# Patient Record
Sex: FEMALE | Race: WHITE | NOT HISPANIC OR LATINO | Employment: OTHER | URBAN - NONMETROPOLITAN AREA
[De-identification: names, ages, dates, MRNs, and addresses within clinical notes are randomized per-mention and may not be internally consistent; named-entity substitution may affect disease eponyms.]

---

## 2017-01-05 ENCOUNTER — AMBULATORY - GICH (OUTPATIENT)
Dept: SCHEDULING | Facility: OTHER | Age: 77
End: 2017-01-05

## 2017-01-13 ENCOUNTER — HISTORY (OUTPATIENT)
Dept: FAMILY MEDICINE | Facility: OTHER | Age: 77
End: 2017-01-13

## 2017-01-13 ENCOUNTER — OFFICE VISIT - GICH (OUTPATIENT)
Dept: FAMILY MEDICINE | Facility: OTHER | Age: 77
End: 2017-01-13

## 2017-01-13 DIAGNOSIS — J01.00 ACUTE MAXILLARY SINUSITIS: ICD-10-CM

## 2017-01-13 DIAGNOSIS — Z23 ENCOUNTER FOR IMMUNIZATION: ICD-10-CM

## 2017-03-16 ENCOUNTER — AMBULATORY - GICH (OUTPATIENT)
Dept: LAB | Facility: OTHER | Age: 77
End: 2017-03-16

## 2017-03-16 DIAGNOSIS — M05.89 OTHER RHEUMATOID ARTHRITIS WITH RHEUMATOID FACTOR OF MULTIPLE SITES (CODE): ICD-10-CM

## 2017-03-16 LAB
ABSOLUTE BASOPHILS - HISTORICAL: 0.1 THOU/CU MM
ABSOLUTE EOSINOPHILS - HISTORICAL: 0.1 THOU/CU MM
ABSOLUTE LYMPHOCYTES - HISTORICAL: 1 THOU/CU MM (ref 0.9–2.9)
ABSOLUTE MONOCYTES - HISTORICAL: 0.6 THOU/CU MM
ABSOLUTE NEUTROPHILS - HISTORICAL: 3.2 THOU/CU MM (ref 1.7–7)
ALBUMIN SERPL-MCNC: 4.4 G/DL (ref 3.5–5.7)
ALT (SGPT) - HISTORICAL: 12 IU/L (ref 7–52)
BASOPHILS # BLD AUTO: 2.2 %
C-REACTIVE PROTEIN - HISTORICAL: <1 MG/DL
CREAT SERPL-MCNC: 0.93 MG/DL (ref 0.7–1.3)
EOSINOPHIL NFR BLD AUTO: 2.1 %
ERYTHROCYTE [DISTWIDTH] IN BLOOD BY AUTOMATED COUNT: 12.5 % (ref 11.5–15.5)
GFR IF NOT AFRICAN AMERICAN - HISTORICAL: 59 ML/MIN/1.73M2
HCT VFR BLD AUTO: 41.1 % (ref 33–51)
HEMOGLOBIN: 13.5 G/DL (ref 12–16)
LYMPHOCYTES NFR BLD AUTO: 19.6 % (ref 20–44)
MCH RBC QN AUTO: 31.9 PG (ref 26–34)
MCHC RBC AUTO-ENTMCNC: 32.9 G/DL (ref 32–36)
MCV RBC AUTO: 97 FL (ref 80–100)
MONOCYTES NFR BLD AUTO: 11.9 %
NEUTROPHILS NFR BLD AUTO: 64.1 % (ref 42–72)
PLATELET # BLD AUTO: 263 THOU/CU MM (ref 140–440)
PMV BLD: 6.5 FL (ref 6.5–11)
RED BLOOD COUNT - HISTORICAL: 4.24 MIL/CU MM (ref 4–5.2)
WHITE BLOOD COUNT - HISTORICAL: 5 THOU/CU MM (ref 4.5–11)

## 2017-06-20 ENCOUNTER — AMBULATORY - GICH (OUTPATIENT)
Dept: LAB | Facility: OTHER | Age: 77
End: 2017-06-20

## 2017-06-20 DIAGNOSIS — M05.79 RHEUMATOID ARTHRITIS OF MULTIPLE SITES WITHOUT ORGAN OR SYSTEM INVOLVEMENT WITH POSITIVE RHEUMATOID FACTOR (H): ICD-10-CM

## 2017-06-20 LAB
ABSOLUTE BASOPHILS - HISTORICAL: 0.1 THOU/CU MM
ABSOLUTE EOSINOPHILS - HISTORICAL: 0.1 THOU/CU MM
ABSOLUTE IMMATURE GRANULOCYTES(METAS,MYELOS,PROS) - HISTORICAL: 0 THOU/CU MM
ABSOLUTE LYMPHOCYTES - HISTORICAL: 1 THOU/CU MM (ref 0.9–2.9)
ABSOLUTE MONOCYTES - HISTORICAL: 0.5 THOU/CU MM
ABSOLUTE NEUTROPHILS - HISTORICAL: 3.4 THOU/CU MM (ref 1.7–7)
ALBUMIN SERPL-MCNC: 4.2 G/DL (ref 3.5–5.7)
ALT (SGPT) - HISTORICAL: 14 IU/L (ref 7–52)
BASOPHILS # BLD AUTO: 1.4 %
C-REACTIVE PROTEIN - HISTORICAL: <1 MG/DL
CREAT SERPL-MCNC: 0.94 MG/DL (ref 0.7–1.3)
EOSINOPHIL NFR BLD AUTO: 2.4 %
ERYTHROCYTE [DISTWIDTH] IN BLOOD BY AUTOMATED COUNT: 13.5 % (ref 11.5–15.5)
ERYTHROCYTE [SEDIMENTATION RATE] IN BLOOD: 10 MM/HR
GFR IF NOT AFRICAN AMERICAN - HISTORICAL: 58 ML/MIN/1.73M2
HCT VFR BLD AUTO: 38.3 % (ref 33–51)
HEMOGLOBIN: 12.8 G/DL (ref 12–16)
IMMATURE GRANULOCYTES(METAS,MYELOS,PROS) - HISTORICAL: 0.4 %
LYMPHOCYTES NFR BLD AUTO: 19.6 % (ref 20–44)
MCH RBC QN AUTO: 32.4 PG (ref 26–34)
MCHC RBC AUTO-ENTMCNC: 33.4 G/DL (ref 32–36)
MCV RBC AUTO: 97 FL (ref 80–100)
MONOCYTES NFR BLD AUTO: 10 %
NEUTROPHILS NFR BLD AUTO: 66.2 % (ref 42–72)
PLATELET # BLD AUTO: 248 THOU/CU MM (ref 140–440)
PMV BLD: 9 FL (ref 6.5–11)
RED BLOOD COUNT - HISTORICAL: 3.95 MIL/CU MM (ref 4–5.2)
WHITE BLOOD COUNT - HISTORICAL: 5.1 THOU/CU MM (ref 4.5–11)

## 2017-07-10 ENCOUNTER — AMBULATORY - GICH (OUTPATIENT)
Dept: PHYSICAL THERAPY | Facility: OTHER | Age: 77
End: 2017-07-10

## 2017-07-10 DIAGNOSIS — M16.11 PRIMARY OSTEOARTHRITIS OF RIGHT HIP: ICD-10-CM

## 2017-07-10 DIAGNOSIS — M54.31 SCIATICA OF RIGHT SIDE: ICD-10-CM

## 2017-07-10 DIAGNOSIS — M76.891 OTHER SPECIFIED ENTHESOPATHIES OF RIGHT LOWER LIMB, EXCLUDING FOOT: ICD-10-CM

## 2017-07-10 DIAGNOSIS — M51.9 THORACIC, THORACOLUMBAR AND LUMBOSACRAL INTERVERTEBRAL DISC DISORDER: ICD-10-CM

## 2017-07-11 ENCOUNTER — HOSPITAL ENCOUNTER (OUTPATIENT)
Dept: PHYSICAL THERAPY | Facility: OTHER | Age: 77
Setting detail: THERAPIES SERIES
End: 2017-07-11

## 2017-07-11 DIAGNOSIS — M51.9 THORACIC, THORACOLUMBAR AND LUMBOSACRAL INTERVERTEBRAL DISC DISORDER: ICD-10-CM

## 2017-07-11 DIAGNOSIS — M54.31 SCIATICA OF RIGHT SIDE: ICD-10-CM

## 2017-07-11 DIAGNOSIS — M76.891 OTHER SPECIFIED ENTHESOPATHIES OF RIGHT LOWER LIMB, EXCLUDING FOOT: ICD-10-CM

## 2017-07-11 DIAGNOSIS — M16.11 PRIMARY OSTEOARTHRITIS OF RIGHT HIP: ICD-10-CM

## 2017-07-19 ENCOUNTER — HOSPITAL ENCOUNTER (OUTPATIENT)
Dept: PHYSICAL THERAPY | Facility: OTHER | Age: 77
Setting detail: THERAPIES SERIES
End: 2017-07-19

## 2017-07-26 ENCOUNTER — HOSPITAL ENCOUNTER (OUTPATIENT)
Dept: PHYSICAL THERAPY | Facility: OTHER | Age: 77
Setting detail: THERAPIES SERIES
End: 2017-07-26

## 2017-07-28 ENCOUNTER — HOSPITAL ENCOUNTER (OUTPATIENT)
Dept: PHYSICAL THERAPY | Facility: OTHER | Age: 77
Setting detail: THERAPIES SERIES
End: 2017-07-28

## 2017-08-01 ENCOUNTER — HOSPITAL ENCOUNTER (OUTPATIENT)
Dept: PHYSICAL THERAPY | Facility: OTHER | Age: 77
Setting detail: THERAPIES SERIES
End: 2017-08-01

## 2017-08-11 ENCOUNTER — HOSPITAL ENCOUNTER (OUTPATIENT)
Dept: PHYSICAL THERAPY | Facility: OTHER | Age: 77
Setting detail: THERAPIES SERIES
End: 2017-08-11

## 2017-08-30 ENCOUNTER — HOSPITAL ENCOUNTER (OUTPATIENT)
Dept: PHYSICAL THERAPY | Facility: OTHER | Age: 77
Setting detail: THERAPIES SERIES
End: 2017-08-30

## 2017-09-13 ENCOUNTER — COMMUNICATION - GICH (OUTPATIENT)
Dept: FAMILY MEDICINE | Facility: OTHER | Age: 77
End: 2017-09-13

## 2017-09-13 DIAGNOSIS — M35.00 SICCA SYNDROME (H): ICD-10-CM

## 2017-09-14 ENCOUNTER — AMBULATORY - GICH (OUTPATIENT)
Dept: LAB | Facility: OTHER | Age: 77
End: 2017-09-14

## 2017-09-14 ENCOUNTER — HISTORY (OUTPATIENT)
Dept: FAMILY MEDICINE | Facility: OTHER | Age: 77
End: 2017-09-14

## 2017-09-14 ENCOUNTER — OFFICE VISIT - GICH (OUTPATIENT)
Dept: FAMILY MEDICINE | Facility: OTHER | Age: 77
End: 2017-09-14

## 2017-09-14 DIAGNOSIS — R44.8 OTHER SYMPTOMS AND SIGNS INVOLVING GENERAL SENSATIONS AND PERCEPTIONS: ICD-10-CM

## 2017-09-14 DIAGNOSIS — R20.0 ANESTHESIA OF SKIN: ICD-10-CM

## 2017-09-14 DIAGNOSIS — M05.79 RHEUMATOID ARTHRITIS OF MULTIPLE SITES WITHOUT ORGAN OR SYSTEM INVOLVEMENT WITH POSITIVE RHEUMATOID FACTOR (H): ICD-10-CM

## 2017-09-14 LAB
ABSOLUTE BASOPHILS - HISTORICAL: 0.1 THOU/CU MM
ABSOLUTE EOSINOPHILS - HISTORICAL: 0.2 THOU/CU MM
ABSOLUTE IMMATURE GRANULOCYTES(METAS,MYELOS,PROS) - HISTORICAL: 0 THOU/CU MM
ABSOLUTE LYMPHOCYTES - HISTORICAL: 1.2 THOU/CU MM (ref 0.9–2.9)
ABSOLUTE MONOCYTES - HISTORICAL: 0.4 THOU/CU MM
ABSOLUTE NEUTROPHILS - HISTORICAL: 4 THOU/CU MM (ref 1.7–7)
ALBUMIN SERPL-MCNC: 3.9 G/DL (ref 3.5–5.7)
ALT (SGPT) - HISTORICAL: 19 IU/L (ref 7–52)
BASOPHILS # BLD AUTO: 1 %
C-REACTIVE PROTEIN - HISTORICAL: <1 MG/DL
CREAT SERPL-MCNC: 1 MG/DL (ref 0.7–1.3)
EOSINOPHIL NFR BLD AUTO: 2.7 %
ERYTHROCYTE [DISTWIDTH] IN BLOOD BY AUTOMATED COUNT: 13.8 % (ref 11.5–15.5)
ERYTHROCYTE [SEDIMENTATION RATE] IN BLOOD: 12 MM/HR
GFR IF NOT AFRICAN AMERICAN - HISTORICAL: 54 ML/MIN/1.73M2
HCT VFR BLD AUTO: 37.5 % (ref 33–51)
HEMOGLOBIN: 12.2 G/DL (ref 12–16)
IMMATURE GRANULOCYTES(METAS,MYELOS,PROS) - HISTORICAL: 0.2 %
LYMPHOCYTES NFR BLD AUTO: 21.1 % (ref 20–44)
MCH RBC QN AUTO: 31.4 PG (ref 26–34)
MCHC RBC AUTO-ENTMCNC: 32.5 G/DL (ref 32–36)
MCV RBC AUTO: 96 FL (ref 80–100)
MONOCYTES NFR BLD AUTO: 7.2 %
NEUTROPHILS NFR BLD AUTO: 67.8 % (ref 42–72)
PLATELET # BLD AUTO: 255 THOU/CU MM (ref 140–440)
PMV BLD: 9.3 FL (ref 6.5–11)
RED BLOOD COUNT - HISTORICAL: 3.89 MIL/CU MM (ref 4–5.2)
VIT B12 SERPL-MCNC: 802 PG/ML (ref 180–914)
WHITE BLOOD COUNT - HISTORICAL: 5.9 THOU/CU MM (ref 4.5–11)

## 2017-09-16 LAB — LYME SCREEN W/REFLEX WEST BLOT - HISTORICAL: NEGATIVE

## 2017-09-21 ENCOUNTER — HOSPITAL ENCOUNTER (OUTPATIENT)
Dept: RADIOLOGY | Facility: OTHER | Age: 77
End: 2017-09-21
Attending: FAMILY MEDICINE

## 2017-09-21 DIAGNOSIS — R44.8 OTHER SYMPTOMS AND SIGNS INVOLVING GENERAL SENSATIONS AND PERCEPTIONS: ICD-10-CM

## 2017-09-21 DIAGNOSIS — R20.0 ANESTHESIA OF SKIN: ICD-10-CM

## 2017-10-02 ENCOUNTER — COMMUNICATION - GICH (OUTPATIENT)
Dept: FAMILY MEDICINE | Facility: OTHER | Age: 77
End: 2017-10-02

## 2017-10-02 DIAGNOSIS — G51.8: ICD-10-CM

## 2017-10-02 DIAGNOSIS — R43.9 DISTURBANCE OF SMELL AND TASTE: ICD-10-CM

## 2017-10-02 DIAGNOSIS — F40.240 CLAUSTROPHOBIA: ICD-10-CM

## 2017-10-02 DIAGNOSIS — G45.9 TRANSIENT CEREBRAL ISCHEMIC ATTACK: ICD-10-CM

## 2017-10-17 ENCOUNTER — HOSPITAL ENCOUNTER (OUTPATIENT)
Dept: RADIOLOGY | Facility: OTHER | Age: 77
End: 2017-10-17
Attending: FAMILY MEDICINE

## 2017-10-17 DIAGNOSIS — G45.9 TRANSIENT CEREBRAL ISCHEMIC ATTACK: ICD-10-CM

## 2017-10-17 DIAGNOSIS — G51.8: ICD-10-CM

## 2017-10-17 DIAGNOSIS — R43.9 DISTURBANCE OF SMELL AND TASTE: ICD-10-CM

## 2017-10-20 ENCOUNTER — HISTORY (OUTPATIENT)
Dept: FAMILY MEDICINE | Facility: OTHER | Age: 77
End: 2017-10-20

## 2017-10-20 ENCOUNTER — OFFICE VISIT - GICH (OUTPATIENT)
Dept: FAMILY MEDICINE | Facility: OTHER | Age: 77
End: 2017-10-20

## 2017-10-20 DIAGNOSIS — Z13.220 ENCOUNTER FOR SCREENING FOR LIPOID DISORDERS: ICD-10-CM

## 2017-10-20 DIAGNOSIS — Z86.73 PERSONAL HISTORY OF TRANSIENT ISCHEMIC ATTACK (TIA), AND CEREBRAL INFARCTION WITHOUT RESIDUAL DEFICITS: ICD-10-CM

## 2017-10-24 ENCOUNTER — AMBULATORY - GICH (OUTPATIENT)
Dept: LAB | Facility: OTHER | Age: 77
End: 2017-10-24

## 2017-10-24 DIAGNOSIS — Z13.220 ENCOUNTER FOR SCREENING FOR LIPOID DISORDERS: ICD-10-CM

## 2017-10-24 LAB
CHOL/HDL RATIO - HISTORICAL: 2.86
CHOLESTEROL TOTAL: 246 MG/DL
HDLC SERPL-MCNC: 86 MG/DL (ref 23–92)
LDLC SERPL CALC-MCNC: 146 MG/DL
NON-HDL CHOLESTEROL - HISTORICAL: 160 MG/DL
PROVIDER ORDERDED STATUS - HISTORICAL: ABNORMAL
TRIGL SERPL-MCNC: 68 MG/DL

## 2017-11-20 ENCOUNTER — COMMUNICATION - GICH (OUTPATIENT)
Dept: FAMILY MEDICINE | Facility: OTHER | Age: 77
End: 2017-11-20

## 2017-11-20 DIAGNOSIS — K21.9 GASTRO-ESOPHAGEAL REFLUX DISEASE WITHOUT ESOPHAGITIS: ICD-10-CM

## 2017-11-29 ENCOUNTER — OFFICE VISIT - GICH (OUTPATIENT)
Dept: FAMILY MEDICINE | Facility: OTHER | Age: 77
End: 2017-11-29

## 2017-11-29 ENCOUNTER — HISTORY (OUTPATIENT)
Dept: FAMILY MEDICINE | Facility: OTHER | Age: 77
End: 2017-11-29

## 2017-11-29 DIAGNOSIS — Z23 ENCOUNTER FOR IMMUNIZATION: ICD-10-CM

## 2017-11-29 DIAGNOSIS — K21.9 GASTRO-ESOPHAGEAL REFLUX DISEASE WITHOUT ESOPHAGITIS: ICD-10-CM

## 2017-12-12 ENCOUNTER — AMBULATORY - GICH (OUTPATIENT)
Dept: LAB | Facility: OTHER | Age: 77
End: 2017-12-12

## 2017-12-12 DIAGNOSIS — M05.79 RHEUMATOID ARTHRITIS OF MULTIPLE SITES WITHOUT ORGAN OR SYSTEM INVOLVEMENT WITH POSITIVE RHEUMATOID FACTOR (H): ICD-10-CM

## 2017-12-12 LAB
ABSOLUTE BASOPHILS - HISTORICAL: 0.1 THOU/CU MM
ABSOLUTE EOSINOPHILS - HISTORICAL: 0.1 THOU/CU MM
ABSOLUTE IMMATURE GRANULOCYTES(METAS,MYELOS,PROS) - HISTORICAL: 0 THOU/CU MM
ABSOLUTE LYMPHOCYTES - HISTORICAL: 1.2 THOU/CU MM (ref 0.9–2.9)
ABSOLUTE MONOCYTES - HISTORICAL: 0.5 THOU/CU MM
ABSOLUTE NEUTROPHILS - HISTORICAL: 3.6 THOU/CU MM (ref 1.7–7)
ALBUMIN SERPL-MCNC: 4.3 G/DL (ref 3.5–5.7)
ALT (SGPT) - HISTORICAL: 14 IU/L (ref 7–52)
BASOPHILS # BLD AUTO: 1.3 %
C-REACTIVE PROTEIN - HISTORICAL: <1 MG/DL
CREAT SERPL-MCNC: 0.98 MG/DL (ref 0.7–1.3)
EOSINOPHIL NFR BLD AUTO: 2.5 %
ERYTHROCYTE [DISTWIDTH] IN BLOOD BY AUTOMATED COUNT: 13.8 % (ref 11.5–15.5)
ERYTHROCYTE [SEDIMENTATION RATE] IN BLOOD: 12 MM/HR
GFR IF NOT AFRICAN AMERICAN - HISTORICAL: 55 ML/MIN/1.73M2
HCT VFR BLD AUTO: 40.8 % (ref 33–51)
HEMOGLOBIN: 13.4 G/DL (ref 12–16)
IMMATURE GRANULOCYTES(METAS,MYELOS,PROS) - HISTORICAL: 0.2 %
LYMPHOCYTES NFR BLD AUTO: 21.5 % (ref 20–44)
MCH RBC QN AUTO: 31.8 PG (ref 26–34)
MCHC RBC AUTO-ENTMCNC: 32.8 G/DL (ref 32–36)
MCV RBC AUTO: 97 FL (ref 80–100)
MONOCYTES NFR BLD AUTO: 8.7 %
NEUTROPHILS NFR BLD AUTO: 65.8 % (ref 42–72)
PLATELET # BLD AUTO: 255 THOU/CU MM (ref 140–440)
PMV BLD: 9 FL (ref 6.5–11)
RED BLOOD COUNT - HISTORICAL: 4.21 MIL/CU MM (ref 4–5.2)
WHITE BLOOD COUNT - HISTORICAL: 5.5 THOU/CU MM (ref 4.5–11)

## 2017-12-27 NOTE — PROGRESS NOTES
Patient Information     Patient Name MRVerena Chacon [id number] Female 1940      Progress Notes by Judy Garcia MD at 10/20/2017 11:15 AM     Author:  Judy Garcia MD Service:  (none) Author Type:  Physician     Filed:  10/20/2017  8:33 PM Encounter Date:  10/20/2017 Status:  Signed     :  Judy Garcia MD (Physician)            Nursing Notes:   Jovany Olsen  10/20/2017 12:25 PM  Signed  Patient presents to discuss her MRI results.     Jovany Olsen ....................  10/20/2017   12:14 PM      Subjective:  Verena Wise is a 77 y.o. female who presents for follow-up; she is accompanied today by her granddaughter    patient is a very pleasant 77-year-old white female with history of Sjogren's. She's been having some tingling in the left side of her face with occasional flushing and a weird sensation in her mouth. This is improving slowly. She has also had some episodes where she has felt lightheaded. This has prompted a carotid ultrasound which was negative. An MRI. MRI results noted below. Her laboratory tests have also been normal.    She does report remote history of a two day episode of chest pain with left arm discomfort;  None subsequently.   She indicates she walks one to 2 miles a day does not get lightheaded or have chest pain. Often times when she gets lightheaded with change of position       Allergies     Allergen  Reactions     Codeine Nausea Only and Dizziness     Current Outpatient Prescriptions on File Prior to Visit       Medication  Sig Dispense Refill     ASCORBATE CALCIUM (VITAMIN C ORAL) Take  by mouth.         aspirin chewable 81 mg chewable tablet Take 1 tablet by mouth once daily with a meal.  0     CALCIUM CARBONATE/VITAMIN D2 (CALCIUM + VITAMIN D ORAL) Take 2 Caps by mouth.         Flaxseed Oil Oil As directed once daily.         folic acid 1 mg tablet Take 2 mg by mouth once daily.         Rosalva, Zingiber  officinalis, (AUGUSTO EXTRACT) 250 mg capsule Take  by mouth.         LACTOBAC CMB #3/FOS/PANTETHINE (PROBIOTIC & ACIDOPHILUS ORAL) Take  by mouth.         predniSONE (DELTASONE) 1 mg tablet Take 1 tablet by mouth once daily with a meal. 90 tablet 3     ranitidine (ZANTAC) 150 mg tablet 1 tablet once daily. 90 tablet 3     RESTASIS 0.05 % ophthalmic emulsion        SALMON OIL/OMEGA-3 FATTY ACIDS (SALMON OIL-1000 ORAL) Take  by mouth.         turmeric root extract 500 mg cap Take  by mouth. With Bioprine  0     No current facility-administered medications on file prior to visit.        Problem List/PMH: reviewed in EMR    Social Hx:  Social History     Substance Use Topics       Smoking status: Never Smoker     Smokeless tobacco: Never Used     Alcohol use No     Social History Narrative    She is substitute teaching full time.      3 children, .    Tobacco use-none.  Alcohol use-none.    Updated Status 2013    Pre loaded 13                        Family Hx:   Family History       Problem   Relation Age of Onset     Hypertension  Father      Heart Disease  Father      Stroke  Father 78     Massive heart attack  age 78       Hypertension  Mother      Heart Disease  Mother 85     Mother  at age 85 of hypertension and heart disease, was born with a cataract/glaucoma.        Stroke  Maternal Grandfather       stroke and heart attack.       Heart Disease  Maternal Grandfather      Cancer-colon  Maternal Uncle       in 80s with colon cancer       Other  Maternal Uncle      Hodgkin's       Heart Disease  Brother      Cancer-breast  Sister 43     Cancer-breast  Maternal Aunt      Good Health  Child      Good Health  Child      Good Health  Child      Good Health  Other      Spouse.         Objective:  /72  Pulse 68  Wt 69.4 kg (153 lb) Comment: pt report  Breastfeeding? No  BMI 25.07 kg/m2    she is alert well-groomed oriented ×3 cranial nerves II through XII are intact. She has  supplemented. Lungs clear heart sounds regular   No murmur, gallop or rub ; extremities are without edema      HISTORY: 77 years Female 6 weeks of tingling in the face twitching of the left eye imaging of the tongue and jaw pain     COMPARISON: None     TECHNIQUE: Routine multisequence and multiplanar MRI imaging of the brain was performed with contrast.     FINDINGS  There is a small area of encephalomalacia of the right cerebellar hemisphere.  The cervical medullary junction is normal in position. There is no evidence of a sellar or suprasellar mass.  No diffusion signal abnormalities are present suggest an acute or subacute infarct.  Ventricles and sulci are symmetric without midline shift or mass effect.      There is no evidence of intracranial mass or mass effect.  No abnormal enhancement is present.     There is periventricular white matter change of chronic small vessel ischemic disease.     IMPRESSION: Age-appropriate change of chronic small vessel ischemic disease. There is a small  old infarct of the right cerebellar hemisphere. No acute changes are present. There is no evidence of intracranial mass. There is no evidence of an acute or subacute infarct.     Electronically Signed By: David Chung M.D. on 10/17/2017 2:47 PM    US CAROTID DUPLEX BILATERAL     HISTORY:  Anesthesia of skin.     TECHNIQUE: Grayscale, color Doppler and spectral Doppler assessment of the major cervical arteries was performed.      COMPARISON: None.     FINDINGS:     The caliber of the common carotid arteries is normal. No systolic flow acceleration is identified in the carotid arteries. The waveforms appear normal.     Mild noncalcified atherosclerotic plaque is identified at the carotid bifurcations.     No elevation of systolic velocities identified within the internal carotid arteries.  No downstream tardus parvus waveforms are identified. Peak systolic velocity within the internal carotid arteries measures up to 95  cm/s on the right and 86 cm per sec on the left.      The external carotid arteries are patent. The vertebral arteries are antegrade.     IMPRESSION:      No evidence of flow-limiting atherosclerotic disease of the neck.        Electronically Signed By: Maynor Leal on 9/21/2017 4:48 PM       Results for orders placed or performed in visit on 09/14/17      ALT (SGPT)      Result  Value Ref Range    ALT (SGPT) 19 7 - 52 IU/L   ALBUMIN      Result  Value Ref Range    ALBUMIN 3.9 3.5 - 5.7 g/dL   CREATININE      Result  Value Ref Range    CREATININE 1.00 0.70 - 1.30 mg/dL    GFR if African American >60 >60 ml/min/1.73m2    GFR if not African American 54 (L) >60 ml/min/1.73m2   C-REACTIVE PROTEIN      Result  Value Ref Range    C-REACTIVE PROTEIN <1.000 <1.000 mg/dL   SEDIMENTATION RATE      Result  Value Ref Range    SEDIMENTATION RATE        12 <31 mm/hr   CBC WITH AUTO DIFFERENTIAL      Result  Value Ref Range    WHITE BLOOD COUNT         5.9 4.5 - 11.0 thou/cu mm    RED BLOOD COUNT           3.89 (L) 4.00 - 5.20 mil/cu mm    HEMOGLOBIN                12.2 12.0 - 16.0 g/dL    HEMATOCRIT                37.5 33.0 - 51.0 %    MCV                       96 80 - 100 fL    MCH                       31.4 26.0 - 34.0 pg    MCHC                      32.5 32.0 - 36.0 g/dL    RDW                       13.8 11.5 - 15.5 %    PLATELET COUNT            255 140 - 440 thou/cu mm    MPV                       9.3 6.5 - 11.0 fL    NEUTROPHILS               67.8 42.0 - 72.0 %    LYMPHOCYTES               21.1 20.0 - 44.0 %    MONOCYTES                 7.2 <12.0 %    EOSINOPHILS               2.7 <8.0 %    BASOPHILS                 1.0 <3.0 %    IMMATURE GRANULOCYTES(METAS,MYELOS,PROS) 0.2 %    ABSOLUTE NEUTROPHILS      4.0 1.7 - 7.0 thou/cu mm    ABSOLUTE LYMPHOCYTES      1.2 0.9 - 2.9 thou/cu mm    ABSOLUTE MONOCYTES        0.4 <0.9 thou/cu mm    ABSOLUTE EOSINOPHILS      0.2 <0.5 thou/cu mm    ABSOLUTE BASOPHILS        0.1 <0.3 thou/cu  mm    ABSOLUTE IMMATURE GRANULOCYTES(METAS,MYELOS,PROS) 0.0 <=0.3 thou/cu mm   LYME SCREEN W/REFLEX      Result  Value Ref Range    LYME SCREEN W/REFLEX WEST BLOT Negative Negative   VITAMIN B12      Result  Value Ref Range    VITAMIN B12 802 180 - 914 pg/mL             Assessment:    ICD-10-CM    1. History of stroke Z86.73    2. Screening cholesterol level Z13.220 LIPID PANEL          Reviewed with patient above imaging of MRI showing old area of infarct in right cerebellar area. Discussed risk factor modification including blood pressure after below 130/80, aspirin a day. Patient's lipid level has not been checked in years. She'll return to clinic fasting.    Plan:   -- Expected clinical course discussed   -- Medications and their side effects discussed  Patient Instructions   1.  Take aspirin a day   2.  Check blood pressure twice a week;  Goal 130/80   3.  Return to clinic for fasting lipid panel;           Electronically signed by Judy Garcia MD

## 2017-12-27 NOTE — PROGRESS NOTES
Patient Information     Patient Name MRN Sex Verena Montero [id number] Female 1940      Progress Notes by Judy Garcia MD at 2017  1:45 PM     Author:  Judy Garcia MD Service:  (none) Author Type:  Physician     Filed:  2017  5:35 PM Encounter Date:  2017 Status:  Signed     :  Judy Garcia MD (Physician)            Nursing Notes:   Linda Barcenas  2017  3:33 PM  Signed  Pt presents with bilateral jaw pain x 2 months along with intermitting itching and numbness that started today. Pt denies any SOB or chest pain.  Linda Burk LPN        Subjective:  Verena Wise is a 77 y.o. female who presents for jaw pain    Patient is a 77-year-old white female who is had intermittent bilateral jaw pain worse on left than right with intermittent itching and numbness along her tongue. This is also been ongoing for a couple of weeks. At times she has lost taste in the left side of her tongue. She's not had any rash. No chest pain lightheadedness. No other neurologic deficit. She denies any decrease in her strength. She does have a history of Sjogren's. She has had no visual hearing or smell changes. No recent URIs. She does get a lot of postnasal drip secondary to her Sjogren's    She denies any fevers chills nausea vomiting diarrhea no excessive thirst other than her usual Sjogren's thirst changes in vision. No mentation changes. Getting her normal sleep. No rashes      Allergies: reviewed in EMR     Current Outpatient Prescriptions on File Prior to Visit       Medication  Sig Dispense Refill     ASCORBATE CALCIUM (VITAMIN C ORAL) Take  by mouth.         CALCIUM CARBONATE/VITAMIN D2 (CALCIUM + VITAMIN D ORAL) Take 2 Caps by mouth.         Flaxseed Oil Oil As directed once daily.         folic acid 1 mg tablet Take 2 mg by mouth once daily.         Augusto, Zingiber officinalis, (AUGUSTO EXTRACT) 250 mg capsule Take  by mouth.         LACTOBAC  "CMB #3/FOS/PANTETHINE (PROBIOTIC & ACIDOPHILUS ORAL) Take  by mouth.         METHOTREXATE SODIUM/PF (METHOTREXATE LPF INJ) 3 Units by Injection route.         predniSONE (DELTASONE) 1 mg tablet Take 1 tablet by mouth once daily with a meal. 90 tablet 2     ranitidine (ZANTAC) 150 mg tablet 1 tablet once daily. 90 tablet 3     RESTASIS 0.05 % ophthalmic emulsion        SALMON OIL/OMEGA-3 FATTY ACIDS (SALMON OIL-1000 ORAL) Take  by mouth.         turmeric root extract 500 mg cap Take  by mouth. With Bioprine  0     No current facility-administered medications on file prior to visit.        Problem List/PMH: reviewed in EMR    Social Hx:  Social History     Substance Use Topics       Smoking status: Never Smoker     Smokeless tobacco: Never Used     Alcohol use No     Social History Narrative    She is substitute teaching full time.      3 children, .    Tobacco use-none.  Alcohol use-none.    Updated Status 2013    Pre loaded 13                        Family Hx:   Family History       Problem   Relation Age of Onset     Hypertension  Father      Heart Disease  Father      Stroke  Father 78     Massive heart attack  age 78       Hypertension  Mother      Heart Disease  Mother 85     Mother  at age 85 of hypertension and heart disease, was born with a cataract/glaucoma.        Stroke  Maternal Grandfather       stroke and heart attack.       Heart Disease  Maternal Grandfather      Cancer-colon  Maternal Uncle       in 80s with colon cancer       Other  Maternal Uncle      Hodgkin's       Heart Disease  Brother      Cancer-breast  Sister 43     Cancer-breast  Maternal Aunt      Good Health  Child      Good Health  Child      Good Health  Child      Good Health  Other      Spouse.         Objective:  /76  Pulse 74  Temp 98.1  F (36.7  C) (Tympanic)   Ht 1.664 m (5' 5.5\")  Wt 69.6 kg (153 lb 6.4 oz)  BMI 25.14 kg/m2   Patient appears well, alert and oriented x 3, pleasant, " cooperative.  ILDEFONSO. TM's clear, Oral pharynx with good dentition, without lesion, erythema or exudate. Moist mucous membranes. Neck supple and free of adenopathy, or masses.  No click with opening or closing of jaw.   No thyromegaly. Lungs are clear, without wheezes, rhonchi or rales. Heart sounds are normal, no murmurs, clicks, gallops or rubs. Abdomen is soft, no tenderness, masses or organomegaly. No CVAT.  Extremities are without edema. Peripheral pulses are normal.   . Skin is normal without suspicious lesions noted. No rash.    Neuro:  Cranial nerves 2-12 intact.   Gait normal.   Able to walk tandem, heels, toes.   Romberg negative .  Normal finger to nose, finger thumb, hand flip, finger drumming, heel to shin testing.  Sensation and strength normal upper and lower extremity.  DTR's symmetric upper and lower extremities.  Short term memory intact.  Articulate and fluent speech.   Judgement and Insight intact.      Results for orders placed or performed in visit on 09/14/17      ALT (SGPT)      Result  Value Ref Range    ALT (SGPT) 19 7 - 52 IU/L   ALBUMIN      Result  Value Ref Range    ALBUMIN 3.9 3.5 - 5.7 g/dL   CREATININE      Result  Value Ref Range    CREATININE 1.00 0.70 - 1.30 mg/dL    GFR if African American >60 >60 ml/min/1.73m2    GFR if not African American 54 (L) >60 ml/min/1.73m2   C-REACTIVE PROTEIN      Result  Value Ref Range    C-REACTIVE PROTEIN <1.000 <1.000 mg/dL   SEDIMENTATION RATE      Result  Value Ref Range    SEDIMENTATION RATE        12 <31 mm/hr   CBC WITH AUTO DIFFERENTIAL      Result  Value Ref Range    WHITE BLOOD COUNT         5.9 4.5 - 11.0 thou/cu mm    RED BLOOD COUNT           3.89 (L) 4.00 - 5.20 mil/cu mm    HEMOGLOBIN                12.2 12.0 - 16.0 g/dL    HEMATOCRIT                37.5 33.0 - 51.0 %    MCV                       96 80 - 100 fL    MCH                       31.4 26.0 - 34.0 pg    MCHC                      32.5 32.0 - 36.0 g/dL    RDW                        13.8 11.5 - 15.5 %    PLATELET COUNT            255 140 - 440 thou/cu mm    MPV                       9.3 6.5 - 11.0 fL    NEUTROPHILS               67.8 42.0 - 72.0 %    LYMPHOCYTES               21.1 20.0 - 44.0 %    MONOCYTES                 7.2 <12.0 %    EOSINOPHILS               2.7 <8.0 %    BASOPHILS                 1.0 <3.0 %    IMMATURE GRANULOCYTES(METAS,MYELOS,PROS) 0.2 %    ABSOLUTE NEUTROPHILS      4.0 1.7 - 7.0 thou/cu mm    ABSOLUTE LYMPHOCYTES      1.2 0.9 - 2.9 thou/cu mm    ABSOLUTE MONOCYTES        0.4 <0.9 thou/cu mm    ABSOLUTE EOSINOPHILS      0.2 <0.5 thou/cu mm    ABSOLUTE BASOPHILS        0.1 <0.3 thou/cu mm    ABSOLUTE IMMATURE GRANULOCYTES(METAS,MYELOS,PROS) 0.0 <=0.3 thou/cu mm   VITAMIN B12      Result  Value Ref Range    VITAMIN B12 802 180 - 914 pg/mL       Assessment:    ICD-10-CM    1. Paresthesia of tongue R44.8 US CAROTID DUPLEX BILATERAL      LYME SCREEN W/REFLEX      VITAMIN B12   2. Anesthesia of skin  R20.0 US CAROTID DUPLEX BILATERAL        Ms. Wise's Body mass index is 25.14 kg/(m^2). This is  Normal   To lose weight we reviewed risks and benefits of appropriate options such as diet, exercise, and medications. Patient's strategy will be  self-directed nutrition plan    Plan:   -- Expected clinical course discussed   -- Medications and their side effects discussed  Patient Instructions     Results for orders placed or performed in visit on 09/14/17      ALT (SGPT)      Result  Value Ref Range    ALT (SGPT) 19 7 - 52 IU/L   ALBUMIN      Result  Value Ref Range    ALBUMIN 3.9 3.5 - 5.7 g/dL   CREATININE      Result  Value Ref Range    CREATININE 1.00 0.70 - 1.30 mg/dL    GFR if African American >60 >60 ml/min/1.73m2    GFR if not African American 54 (L) >60 ml/min/1.73m2   C-REACTIVE PROTEIN      Result  Value Ref Range    C-REACTIVE PROTEIN <1.000 <1.000 mg/dL   SEDIMENTATION RATE      Result  Value Ref Range    SEDIMENTATION RATE        12 <31 mm/hr   CBC WITH AUTO  DIFFERENTIAL      Result  Value Ref Range    WHITE BLOOD COUNT         5.9 4.5 - 11.0 thou/cu mm    RED BLOOD COUNT           3.89 (L) 4.00 - 5.20 mil/cu mm    HEMOGLOBIN                12.2 12.0 - 16.0 g/dL    HEMATOCRIT                37.5 33.0 - 51.0 %    MCV                       96 80 - 100 fL    MCH                       31.4 26.0 - 34.0 pg    MCHC                      32.5 32.0 - 36.0 g/dL    RDW                       13.8 11.5 - 15.5 %    PLATELET COUNT            255 140 - 440 thou/cu mm    MPV                       9.3 6.5 - 11.0 fL    NEUTROPHILS               67.8 42.0 - 72.0 %    LYMPHOCYTES               21.1 20.0 - 44.0 %    MONOCYTES                 7.2 <12.0 %    EOSINOPHILS               2.7 <8.0 %    BASOPHILS                 1.0 <3.0 %    IMMATURE GRANULOCYTES(METAS,MYELOS,PROS) 0.2 %    ABSOLUTE NEUTROPHILS      4.0 1.7 - 7.0 thou/cu mm    ABSOLUTE LYMPHOCYTES      1.2 0.9 - 2.9 thou/cu mm    ABSOLUTE MONOCYTES        0.4 <0.9 thou/cu mm    ABSOLUTE EOSINOPHILS      0.2 <0.5 thou/cu mm    ABSOLUTE BASOPHILS        0.1 <0.3 thou/cu mm    ABSOLUTE IMMATURE GRANULOCYTES(METAS,MYELOS,PROS) 0.0 <=0.3 thou/cu mm     Will screen for Lyme,  Carotid ultrasound    start a baby aspirin a day      Index [place]   Chopra's Palsy   ________________________________________________________________________  KEY POINTS    Bell's palsy is a weakness or paralysis of a facial nerve. Nerves on each side of your face control movement of the muscles on that side.    If your symptoms are mild, you may not need treatment. If your symptoms are more serious, your provider may prescribe steroid medicine and antiviral medicine.    Ask your healthcare provider how to take care of yourself at home.  ________________________________________________________________________  What is Bell's palsy?  Bell's palsy is a weakness or paralysis of a facial nerve. Nerves on each side of your face control movement of the muscles on that  side. When a facial nerve is weak or paralyzed, that side of your face droops and it may be hard to smile or close your eye on that side. The severity of Bell's palsy can vary from mild weakness to complete paralysis of one side of the face.  What is the cause?  Bishop  palsy happens when a nerve in your face gets swollen or irritated, which makes the facial muscle get weak. The exact reason this happens is not known. One possible cause is that a virus, or your body s reaction to a virus, has caused swelling or irritation that damaged the facial nerve. When this happens, the nerve can no longer control the facial muscles. You can lose part or all control of the muscles for weeks or months until the nerve heals.  Lyme disease, an infection caused by the bite of an infected tick, is another possible cause.  What are the symptoms?  The first symptom may be an ache behind the ear. Then that side of the face will become weak or paralyzed.  Other symptoms may include:    Watery eye    An eye that won t close completely    Decreased taste    A change in hearing    Trouble smiling, drinking, or chewing on one side of your mouth    Slurring of your words when you talk  Symptoms may develop within a few hours or over a couple of days. The faster the symptoms happen, the more severe the weakness or paralysis is likely to be.  Get help from a healthcare provider right away, such as in 30 to 60 minutes, if your symptoms develop quickly. Your provider will want to make sure that you are not having a stroke.  How is it diagnosed?  Your healthcare provider will ask about your symptoms and medical history and examine you. You may have tests or scans to check for other possible causes of your symptoms, such as a stroke or tumor.  How is it treated?  If your symptoms are mild, you may not need treatment. If your symptoms are more severe, your provider will prescribe steroid medicine and may also prescribe antiviral medicine.  Using a  steroid for a long time can have serious side effects. Take steroid medicine exactly as your healthcare provider prescribes. Don t take more or less of it than prescribed by your provider and don t take it longer than prescribed. Don t stop taking a steroid without your provider's approval. You may have to lower your dosage slowly before stopping it.  If your eye does not close completely, it needs to be protected from problems such as dust and dryness. Patching your eye or using eye drops or eye ointments can protect your eye. If your eye is not protected, you could lose vision in that eye.  Physical therapy, including exercises and massage, may help you keep some muscle strength and keep your facial muscles flexible until your symptoms go away.  Biofeedback is helpful for some people. A multivitamin and mineral supplement may also be recommended to help your symptoms.  Bell's palsy can last several weeks even when it s mild. It may be months before you know how much muscle control you will get back.  It s rare to have Bell's palsy more than once. If you have facial paralysis again, another problem may be causing it and you should get it checked right away by your healthcare provider.  How can I take care of myself?    Follow the full course of treatment prescribed by your healthcare provider.    Take nonprescription pain medicine, such as acetaminophen, ibuprofen, or naproxen. Read the label and take as directed. Unless recommended by your healthcare provider, you should not take these medicines for more than 10 days.    Nonsteroidal anti-inflammatory medicines (NSAIDs), such as ibuprofen, naproxen, and aspirin, may cause stomach bleeding and other problems. These risks increase with age.    Acetaminophen may cause liver damage or other problems. Unless recommended by your provider, don't take more than 3000 milligrams (mg) in 24 hours. To make sure you don t take too much, check other medicines you take to see if  they also contain acetaminophen. Ask your provider if you need to avoid drinking alcohol while taking this medicine.    Moist heat may help relieve pain, relax your muscles, and make it easier for you to move your face muscles. Moist heat includes moist heating pads that you can buy at most drugstores, a warm wet washcloth, or a hot shower. To prevent burns to your skin, follow directions on the package and do not lie on any type of hot pad. Don t use heat if you have swelling.    Put an ice pack, gel pack, or package of frozen vegetables wrapped in a cloth on the area every 3 to 4 hours, for up to 20 minutes at a time.    Try gentle facial massage to help you get back more muscle movement as you recover.    Acupuncture, electrical stimulation or biofeedback training may help. Ask your healthcare provider about this.    If your eye is not closing completely, keep it moist. Some things that might help keep your eye from getting too dry are:    Use artificial tears when you are awake.    Use eye lubricant ointment when you are sleeping. The ointment may be used also when you are awake if artificial tears don t give enough protection. However, the ointment may blur your vision. Your provider may recommend that you use an eye patch to make sure your eye stays moist when you sleep.    Wear eyeglasses or a shield to protect your eye. Wear sunglasses when you are out in the sun.    Take extra care to keep your eye moist when you are working on a computer. People tend to blink less often while at a computer. If it s hard for you to blink, you can use the back of your index finger to move your eyelid down over your eye. Keep eye drops handy.    Take care of your health. Try to get at least 7 to 9 hours of sleep each night. Eat a healthy diet and try to keep a healthy weight. If you smoke, try to quit. If you want to drink alcohol, ask your healthcare provider how much is safe for you to drink. Learn ways to manage  stress.    Ask your healthcare provider:    How and when you will get your test results    How long it will take to recover    If there are activities you should avoid and when you can return to your normal activities    How to take care of yourself at home    What symptoms or problems you should watch for and what to do if you have them    Make sure you know when you should come back for a checkup.  Developed by [employer].  Adult Advisor 2016.3 published by [employer].  Last modified: 2016-04-14  Last reviewed: 2016-04-14  This content is reviewed periodically and is subject to change as new health information becomes available. The information is intended to inform and educate and is not a replacement for medical evaluation, advice, diagnosis or treatment by a healthcare professional.  References   Adult Advisor 2016.3 Index    Copyright   2016 [employer], a division of [employer] All rights reserved.           Electronically signed by Judy Garcia MD

## 2017-12-27 NOTE — PROGRESS NOTES
Patient Information     Patient Name MRN Verena Carvajal [id number] Female 1940      Progress Notes by Zarina Mares at 10/17/2017 11:34 AM     Author:  Zarina Mares Service:  (none) Author Type:  Other Clinical Staff     Filed:  10/17/2017 11:34 AM Date of Service:  10/17/2017 11:34 AM Status:  Signed     :  Zarina Mares (Other Clinical Staff)            Falls Risk Criteria:    Age 65 and older or under age 4        Sensory deficits    Poor vision    Use of ambulatory aides    Impaired judgment    Unable to walk independently    Meets High Risk criteria for falls:  Yes               1.  Do you have dizziness or vertigo?    yes                    2.  Do you need help standing or walking?   no                 3.  Have you fallen within the last 6 months?    no           4.  Has the patient been fasting?      no       If any risks are marked Yes, the following interventions are utilized:    Do not leave patient unattended     Assist patient in the dressing room and bathroom    Have ambulatory aides available throughout procedure    Involve patient s family if available
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
## 2017-12-28 NOTE — PROGRESS NOTES
Patient Information     Patient Name MRVerena Chacon [id number] Female 1940      Progress Notes by Judy Garcia MD at 2017 10:45 AM     Author:  Judy Garcia MD Service:  (none) Author Type:  Physician     Filed:  2017 10:32 AM Encounter Date:  2017 Status:  Signed     :  Judy Garcia MD (Physician)            Nursing Notes:   Kimberly Kelley  2017 11:11 AM  Signed  Patient presents for epigastric pain X 3 weeks along with an itching and tingling in her throat.  This seems to have moved to her tongue and cheeks more recently.   Kimberly Kelley LPN........................2017  11:01 AM           Subjective:  Verena Wise is a 77 y.o. female who presents for epigastric pain    Patient is a very pleasant 77-year-old white female who is on multiple supplements over-the-counter including fish oil along with her prednisone and methotrexate. She is followed by rheumatology for her Sjogren's. She is down to 1 mg of prednisone. She states in the last 3 weeks she's been having some epigastric discomfort about an hour after she eats. Lately when she wakes up in the morning hasn't itching and tingling in her throat. She denies metallic taste. Denies any blood in her stool denies any lightheadedness chest pain or palpitations. She has not had any change in her energy level. Denies any chest pain palpitations no visual changes. No changes in taste. Denies any URI symptoms. No changes in bowel or bladder       Allergies     Allergen  Reactions     Codeine Nausea Only and Dizziness     Current Outpatient Prescriptions on File Prior to Visit       Medication  Sig Dispense Refill     ASCORBATE CALCIUM (VITAMIN C ORAL) Take  by mouth.         aspirin chewable 81 mg chewable tablet Take 1 tablet by mouth once daily with a meal.  0     CALCIUM CARBONATE/VITAMIN D2 (CALCIUM + VITAMIN D ORAL) Take 2 Caps by mouth.         Flaxseed Oil Oil As  directed once daily.         folic acid 1 mg tablet Take 2 mg by mouth once daily.         Rosalva, Zingiber officinalis, (ROSALVA EXTRACT) 250 mg capsule Take  by mouth.         LACTOBAC CMB #3/FOS/PANTETHINE (PROBIOTIC & ACIDOPHILUS ORAL) Take  by mouth.         methotrexate 25 mg/mL injection        predniSONE (DELTASONE) 1 mg tablet Take 1 tablet by mouth once daily with a meal. 90 tablet 3     RESTASIS 0.05 % ophthalmic emulsion        SALMON OIL/OMEGA-3 FATTY ACIDS (SALMON OIL-1000 ORAL) Take  by mouth.         turmeric root extract 500 mg cap Take  by mouth. With Bioprine  0     No current facility-administered medications on file prior to visit.        Problem List/PMH: reviewed in EMR    Social Hx:  Social History     Substance Use Topics       Smoking status: Never Smoker     Smokeless tobacco: Never Used     Alcohol use No     Social History Narrative    She is substitute teaching full time.      3 children, .    Tobacco use-none.  Alcohol use-none.    Updated Status 2013    Pre loaded 13                        Family Hx:   Family History       Problem   Relation Age of Onset     Hypertension  Father      Heart Disease  Father      Stroke  Father 78     Massive heart attack  age 78       Hypertension  Mother      Heart Disease  Mother 85     Mother  at age 85 of hypertension and heart disease, was born with a cataract/glaucoma.        Stroke  Maternal Grandfather       stroke and heart attack.       Heart Disease  Maternal Grandfather      Cancer-colon  Maternal Uncle       in 80s with colon cancer       Other  Maternal Uncle      Hodgkin's       Heart Disease  Brother      Cancer-breast  Sister 43     Cancer-breast  Maternal Aunt      Good Health  Child      Good Health  Child      Good Health  Child      Good Health  Other      Spouse.         Objective:  /72  Pulse 68  Wt 69.9 kg (154 lb)  Breastfeeding? No  BMI 25.24 kg/m2    patient is alert well-groomed  oriented ×3 NCAT cranial nerves II through XII are intact PERRLA EOMI dry but slightly moist mucous membranes likely secondary to her Sjogren's. No erythema no exudate. neck is supple without adenopathy or thyromegaly. Lungs are clear to auscultation heart sounds S1 and S2 and regular rate and rhythm abdomen is soft minimally tender in the epigastric area but no rebound guarding normal bowel sounds. Extremities are without edema. Symmetric strength upper and lower extremity. No rash skin is warm    Assessment:    ICD-10-CM    1. Needs flu shot Z23 FLU VACCINE => 65 YRS HIGH DOSE TRIVALENT IIV3 IM   2. Gastroesophageal reflux disease, esophagitis presence not specified K21.9 ranitidine (ZANTAC) 150 mg tablet          Plan:   -- Expected clinical course discussed   -- Medications and their side effects discussed  Patient Instructions   1. Increase Zantac to twice a day  2. Flu shot updated today  3. Stop all supplements other than omega-3  4. Decrease your prednisone to every other day  5. If persistent tingling consideration for Claritin over-the-counter this would dry out  6. Lemon drops  7. Worsening symptoms may prompt EGD          Electronically signed by Judy Garcia MD

## 2017-12-28 NOTE — TELEPHONE ENCOUNTER
Patient Information     Patient Name MRN Verena Carvajal [id number] Female 1940      Telephone Encounter by Judy Rowell at 10/4/2017 11:14 AM     Author:  Judy Rowell Service:  (none) Author Type:  (none)     Filed:  10/4/2017 11:15 AM Encounter Date:  10/2/2017 Status:  Signed     :  Judy Rowell            SER- patient is calling to request medication xanex to take prior to up coming MRI. Ok to call patient [phone].      Juyd Rowell ....................  10/4/2017   11:15 AM

## 2017-12-28 NOTE — TELEPHONE ENCOUNTER
Patient Information     Patient Name Verena Sher [id number] Female 1940      Telephone Encounter by Kimberly Kelley at 10/4/2017 11:38 AM     Author:  Kimberly Kelley Service:  (none) Author Type:  (none)     Filed:  10/4/2017 12:31 PM Encounter Date:  10/2/2017 Status:  Signed     :  Kimberly Kelley            Patient states MRI is next week and she is a little claustrophobic and is wondering if she can get a xanax for the MRI.   Okay for tomorrow  Kimberly Kelley LPN........................10/4/2017  12:29 PM
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
## 2017-12-28 NOTE — TELEPHONE ENCOUNTER
Patient Information     Patient Name MRN Verena Carvajal [id number] Female 1940      Telephone Encounter by Lotus Duffy RN at 2017  9:04 AM     Author:  Lotus Duffy RN Service:  (none) Author Type:  NURS- Registered Nurse     Filed:  2017  9:30 AM Encounter Date:  2017 Status:  Signed     :  Lotus Duffy RN (NURS- Registered Nurse)              Pt requested medication not prescribed since .  Similar medication is due for refill, called to verify with pt correct medication  Left message to call back  ....................Lotus Duffy RN  2017   9:24 AM

## 2017-12-28 NOTE — PROGRESS NOTES
Patient Information     Patient Name MRN Verena Carvajal [id number] Female 1940      Progress Notes by Kimberly Keyes R.T. (ARRT) at 2017  2:28 PM     Author:  Kim[facility] Service:  (none) Author Type:  [employer] - Registered Radiologic Technologist     Filed:  2017  2:28 PM Date of Service:  2017  2:28 PM Status:  Signed     :  Kimberly Keyes R.T. (ARRT) ([employer] - Registered Radiologic Technologist)            Falls Risk Criteria:    Age 65 and older or under age 4        Sensory deficits    Poor vision    Use of ambulatory aides    Impaired judgment    Unable to walk independently    Meets High Risk criteria for falls:  Yes               1.  Do you have dizziness or vertigo?    no                    2.  Do you need help standing or walking?   no                 3.  Have you fallen within the last 6 months?    no           4.  Has the patient been fasting?      no       If any risks are marked Yes, the following interventions are utilized:    Do not leave patient unattended     Assist patient in the dressing room and bathroom    Have ambulatory aides available throughout procedure    Involve patient s family if available

## 2017-12-28 NOTE — TELEPHONE ENCOUNTER
Patient Information     Patient Name MRN Verena Carvajal [id number] Female 1940      Telephone Encounter by Judy Garcia MD at 10/4/2017  9:25 AM     Author:  Judy Garcia MD Service:  (none) Author Type:  Physician     Filed:  10/4/2017  9:26 AM Encounter Date:  10/2/2017 Status:  Signed     :  Judy Garcia MD (Physician)            Order placed.  Please contact patient
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
## 2017-12-28 NOTE — TELEPHONE ENCOUNTER
Patient Information     Patient Name MRVerena Chacon [id number] Female 1940      Telephone Encounter by Lotus Duffy RN at 2017  8:27 AM     Author:  Lotus Duffy RN Service:  (none) Author Type:  NURS- Registered Nurse     Filed:  2017  8:29 AM Encounter Date:  2017 Status:  Signed     :  Lotus Duffy RN (NURS- Registered Nurse)            Pt called back, although she was confused she thinks she called in an old RX number.  Zantac is the most current RX, sent new order today.    H2 Blockers    Office visit in the past 12 months or per provider note.    Last visit with MARCIO TUTTLE was on: 10/20/2017 in [facility]  Next visit with MARCIO TUTTLE is on: 2017 in [facility]  Next visit with Family Practice is on: 2017 in [facility]    Max refill for 12 months from last office visit or per provider note.    Prescription refilled per RN Medication Refill Policy.................... Lotus Duffy RN ....................  2017   8:28 AM

## 2017-12-28 NOTE — TELEPHONE ENCOUNTER
Patient Information     Patient Name MRN Verena Carvajal [id number] Female 1940      Telephone Encounter by Nestor Dowell RN at 9/15/2017  4:09 PM     Author:  Nestor Dowell RN Service:  (none) Author Type:  NURS- Registered Nurse     Filed:  9/15/2017  4:13 PM Encounter Date:  2017 Status:  Signed     :  Nestor Dowell RN (NURS- Registered Nurse)            This is a Refill request from: [facility]  Name of Medication: Prednisone  Quantity requested: 90 tabs with 3 refills  Last fill date: 17 as per rx request  Due for refill: Yes, as per chart review and per rx request  Last visit with JUDY GARCIA was on: 2017 in [facility]  PCP:  Judy Garcia MD  Controlled Substance Agreement: N/A   Diagnosis r/t this medication request: Sjogren's disease (HC)    Chart review shows that patient was seen yesterday by PCP. Writer will route rx request to PCP for her consideration/approval as writer is unable to fill rx as per RN refill protocol.     Unable to complete prescription refill per RN Medication Refill Policy.................... Nestor Dowell RN ....................  9/15/2017   4:10 PM
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
## 2017-12-28 NOTE — PATIENT INSTRUCTIONS
Patient Information     Patient Name MRVerena Chacon [id number] Female 1940      Patient Instructions by Judy Garcia MD at 2017 10:45 AM     Author:  Judy Garcia MD Service:  (none) Author Type:  Physician     Filed:  2017 11:51 AM Encounter Date:  2017 Status:  Signed     :  Judy Garcia MD (Physician)            1. Increase Zantac to twice a day  2. Flu shot updated today  3. Stop all supplements other than omega-3  4. Decrease your prednisone to every other day  5. If persistent tingling consideration for Claritin over-the-counter this would dry out  6. Lemon drops  7. Worsening symptoms may prompt EGD
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
## 2017-12-29 NOTE — PATIENT INSTRUCTIONS
Patient Information     Patient Name MRN Sex Verena Montero [id number] Female 1940      Patient Instructions by Judy Garcia MD at 2017  2:56 PM     Author:  Judy Garcia MD  Service:  (none) Author Type:  Physician     Filed:  2017  2:56 PM  Encounter Date:  2017 Status:  Addendum     :  Judy Garcia MD (Physician)        Related Notes: Original Note by Judy Garcia MD (Physician) filed at 2017  2:56 PM            Results for orders placed or performed in visit on 17      ALT (SGPT)      Result  Value Ref Range    ALT (SGPT) 19 7 - 52 IU/L   ALBUMIN      Result  Value Ref Range    ALBUMIN 3.9 3.5 - 5.7 g/dL   CREATININE      Result  Value Ref Range    CREATININE 1.00 0.70 - 1.30 mg/dL    GFR if African American >60 >60 ml/min/1.73m2    GFR if not African American 54 (L) >60 ml/min/1.73m2   C-REACTIVE PROTEIN      Result  Value Ref Range    C-REACTIVE PROTEIN <1.000 <1.000 mg/dL   SEDIMENTATION RATE      Result  Value Ref Range    SEDIMENTATION RATE        12 <31 mm/hr   CBC WITH AUTO DIFFERENTIAL      Result  Value Ref Range    WHITE BLOOD COUNT         5.9 4.5 - 11.0 thou/cu mm    RED BLOOD COUNT           3.89 (L) 4.00 - 5.20 mil/cu mm    HEMOGLOBIN                12.2 12.0 - 16.0 g/dL    HEMATOCRIT                37.5 33.0 - 51.0 %    MCV                       96 80 - 100 fL    MCH                       31.4 26.0 - 34.0 pg    MCHC                      32.5 32.0 - 36.0 g/dL    RDW                       13.8 11.5 - 15.5 %    PLATELET COUNT            255 140 - 440 thou/cu mm    MPV                       9.3 6.5 - 11.0 fL    NEUTROPHILS               67.8 42.0 - 72.0 %    LYMPHOCYTES               21.1 20.0 - 44.0 %    MONOCYTES                 7.2 <12.0 %    EOSINOPHILS               2.7 <8.0 %    BASOPHILS                 1.0 <3.0 %    IMMATURE GRANULOCYTES(METAS,MYELOS,PROS) 0.2 %    ABSOLUTE NEUTROPHILS      4.0  1.7 - 7.0 thou/cu mm    ABSOLUTE LYMPHOCYTES      1.2 0.9 - 2.9 thou/cu mm    ABSOLUTE MONOCYTES        0.4 <0.9 thou/cu mm    ABSOLUTE EOSINOPHILS      0.2 <0.5 thou/cu mm    ABSOLUTE BASOPHILS        0.1 <0.3 thou/cu mm    ABSOLUTE IMMATURE GRANULOCYTES(METAS,MYELOS,PROS) 0.0 <=0.3 thou/cu mm     Will screen for Lyme,  Carotid ultrasound    start a baby aspirin a day      Index Surinamese   Chopra's Palsy   ________________________________________________________________________  KEY POINTS    Bell's palsy is a weakness or paralysis of a facial nerve. Nerves on each side of your face control movement of the muscles on that side.    If your symptoms are mild, you may not need treatment. If your symptoms are more serious, your provider may prescribe steroid medicine and antiviral medicine.    Ask your healthcare provider how to take care of yourself at home.  ________________________________________________________________________  What is Bell's palsy?  Bell's palsy is a weakness or paralysis of a facial nerve. Nerves on each side of your face control movement of the muscles on that side. When a facial nerve is weak or paralyzed, that side of your face droops and it may be hard to smile or close your eye on that side. The severity of Bell's palsy can vary from mild weakness to complete paralysis of one side of the face.  What is the cause?  [place]  palsy happens when a nerve in your face gets swollen or irritated, which makes the facial muscle get weak. The exact reason this happens is not known. One possible cause is that a virus, or your body s reaction to a virus, has caused swelling or irritation that damaged the facial nerve. When this happens, the nerve can no longer control the facial muscles. You can lose part or all control of the muscles for weeks or months until the nerve heals.  Lyme disease, an infection caused by the bite of an infected tick, is another possible cause.  What are the symptoms?  The first  symptom may be an ache behind the ear. Then that side of the face will become weak or paralyzed.  Other symptoms may include:    Watery eye    An eye that won t close completely    Decreased taste    A change in hearing    Trouble smiling, drinking, or chewing on one side of your mouth    Slurring of your words when you talk  Symptoms may develop within a few hours or over a couple of days. The faster the symptoms happen, the more severe the weakness or paralysis is likely to be.  Get help from a healthcare provider right away, such as in 30 to 60 minutes, if your symptoms develop quickly. Your provider will want to make sure that you are not having a stroke.  How is it diagnosed?  Your healthcare provider will ask about your symptoms and medical history and examine you. You may have tests or scans to check for other possible causes of your symptoms, such as a stroke or tumor.  How is it treated?  If your symptoms are mild, you may not need treatment. If your symptoms are more severe, your provider will prescribe steroid medicine and may also prescribe antiviral medicine.  Using a steroid for a long time can have serious side effects. Take steroid medicine exactly as your healthcare provider prescribes. Don t take more or less of it than prescribed by your provider and don t take it longer than prescribed. Don t stop taking a steroid without your provider's approval. You may have to lower your dosage slowly before stopping it.  If your eye does not close completely, it needs to be protected from problems such as dust and dryness. Patching your eye or using eye drops or eye ointments can protect your eye. If your eye is not protected, you could lose vision in that eye.  Physical therapy, including exercises and massage, may help you keep some muscle strength and keep your facial muscles flexible until your symptoms go away.  Biofeedback is helpful for some people. A multivitamin and mineral supplement may also be  recommended to help your symptoms.  Bell's palsy can last several weeks even when it s mild. It may be months before you know how much muscle control you will get back.  It s rare to have Bell's palsy more than once. If you have facial paralysis again, another problem may be causing it and you should get it checked right away by your healthcare provider.  How can I take care of myself?    Follow the full course of treatment prescribed by your healthcare provider.    Take nonprescription pain medicine, such as acetaminophen, ibuprofen, or naproxen. Read the label and take as directed. Unless recommended by your healthcare provider, you should not take these medicines for more than 10 days.    Nonsteroidal anti-inflammatory medicines (NSAIDs), such as ibuprofen, naproxen, and aspirin, may cause stomach bleeding and other problems. These risks increase with age.    Acetaminophen may cause liver damage or other problems. Unless recommended by your provider, don't take more than 3000 milligrams (mg) in 24 hours. To make sure you don t take too much, check other medicines you take to see if they also contain acetaminophen. Ask your provider if you need to avoid drinking alcohol while taking this medicine.    Moist heat may help relieve pain, relax your muscles, and make it easier for you to move your face muscles. Moist heat includes moist heating pads that you can buy at most drugstores, a warm wet washcloth, or a hot shower. To prevent burns to your skin, follow directions on the package and do not lie on any type of hot pad. Don t use heat if you have swelling.    Put an ice pack, gel pack, or package of frozen vegetables wrapped in a cloth on the area every 3 to 4 hours, for up to 20 minutes at a time.    Try gentle facial massage to help you get back more muscle movement as you recover.    Acupuncture, electrical stimulation or biofeedback training may help. Ask your healthcare provider about this.    If your eye is  not closing completely, keep it moist. Some things that might help keep your eye from getting too dry are:    Use artificial tears when you are awake.    Use eye lubricant ointment when you are sleeping. The ointment may be used also when you are awake if artificial tears don t give enough protection. However, the ointment may blur your vision. Your provider may recommend that you use an eye patch to make sure your eye stays moist when you sleep.    Wear eyeglasses or a shield to protect your eye. Wear sunglasses when you are out in the sun.    Take extra care to keep your eye moist when you are working on a computer. People tend to blink less often while at a computer. If it s hard for you to blink, you can use the back of your index finger to move your eyelid down over your eye. Keep eye drops handy.    Take care of your health. Try to get at least 7 to 9 hours of sleep each night. Eat a healthy diet and try to keep a healthy weight. If you smoke, try to quit. If you want to drink alcohol, ask your healthcare provider how much is safe for you to drink. Learn ways to manage stress.    Ask your healthcare provider:    How and when you will get your test results    How long it will take to recover    If there are activities you should avoid and when you can return to your normal activities    How to take care of yourself at home    What symptoms or problems you should watch for and what to do if you have them    Make sure you know when you should come back for a checkup.  Developed by [employer].  Adult Advisor 2016.3 published by [employer].  Last modified: 2016-04-14  Last reviewed: 2016-04-14  This content is reviewed periodically and is subject to change as new health information becomes available. The information is intended to inform and educate and is not a replacement for medical evaluation, advice, diagnosis or treatment by a healthcare professional.  References   Adult Advisor 2016.3 Index    Copyright    2016 [employer], a division of [employer] All rights reserved.

## 2017-12-29 NOTE — PATIENT INSTRUCTIONS
Patient Information     Patient Name MRN Verena Carvajal [id number] Female 1940      Patient Instructions by Judy Garcia MD at 10/20/2017 11:15 AM     Author:  Judy Garcia MD  Service:  (none) Author Type:  Physician     Filed:  10/20/2017 12:39 PM  Encounter Date:  10/20/2017 Status:  Addendum     :  Judy Garcia MD (Physician)        Related Notes: Original Note by Judy Garcia MD (Physician) filed at 10/20/2017 12:39 PM            1.  Take aspirin a day   2.  Check blood pressure twice a week;  Goal 130/80   3.  Return to clinic for fasting lipid panel;

## 2017-12-30 NOTE — NURSING NOTE
Patient Information     Patient Name MRN Verena Carvajal [id number] Female 1940      Nursing Note by Jovany Olsen at 10/20/2017 11:15 AM     Author:  Jovany Olsen Service:  (none) Author Type:  (none)     Filed:  10/20/2017 12:25 PM Encounter Date:  10/20/2017 Status:  Signed     :  Jovany Olsen            Patient presents to discuss her MRI results.     Jovany Olsen ....................  10/20/2017   12:14 PM

## 2017-12-30 NOTE — NURSING NOTE
Patient Information     Patient Name MRN Verena Carvajal [id number] Female 1940      Nursing Note by Kimberly Kelley at 2017 10:45 AM     Author:  Kimberly Kelley Service:  (none) Author Type:  (none)     Filed:  2017 11:11 AM Encounter Date:  2017 Status:  Signed     :  Kimberly Kelley            Patient presents for epigastric pain X 3 weeks along with an itching and tingling in her throat.  This seems to have moved to her tongue and cheeks more recently.   Kimberly Kelley LPN........................2017  11:01 AM

## 2017-12-30 NOTE — NURSING NOTE
Patient Information     Patient Name MRN Verena Carvajal [id number] Female 1940      Nursing Note by Linda Barcenas at 2017  1:45 PM     Author:  Linda Barcenas Service:  (none) Author Type:  (none)     Filed:  2017  3:33 PM Encounter Date:  2017 Status:  Signed     :  Linda Barcenas            Pt presents with bilateral jaw pain x 2 months along with intermitting itching and numbness that started today. Pt denies any SOB or chest pain.  Linda Burk LPN
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
## 2018-01-02 NOTE — NURSING NOTE
Patient Information     Patient Name MRN Verena Carvajal [id number] Female 1940      Nursing Note by Noemi Campos at 2017 10:15 AM     Author:  Noemi Campos Service:  (none) Author Type:  (none)     Filed:  2017 10:25 AM Encounter Date:  2017 Status:  Signed     :  Noemi Camops            Patient is here today for facial pain, she states that this has been ongoing. Today it is only in her face and it is also causing a head ache. She would like her flu shot today.  Noemi Campos LPN.......................... 2017  10:19 AM

## 2018-01-03 NOTE — PROGRESS NOTES
Patient Information     Patient Name MRVerena Chacon [id number] Female 1940      Progress Notes by Judy Garcia MD at 2017 10:15 AM     Author:  Judy Garcia MD Service:  (none) Author Type:  Physician     Filed:  2017 12:48 PM Encounter Date:  2017 Status:  Signed     :  Judy Garcia MD (Physician)            Nursing Notes:   Noemi Campos  2017 10:25 AM  Signed  Patient is here today for facial pain, she states that this has been ongoing. Today it is only in her face and it is also causing a head ache. She would like her flu shot today.  Noemi Campos LPN.......................... 2017  10:19 AM        Subjective:  Verena Wise is a 76 y.o. female who presents sinus pain and pressure this is a pleasant 76-year-old white female with history of Sjogren syndrome who comes in today with a week to 10 day long history of URI symptoms but pain and pressure over her left maxillary sinus. She notes that she has had sinus congestion the past she uses a Melanie pot this has not been working. She's not had any significant fevers she is now having more headache and fullness in her left ear. She noticed that she has more swelling over her left cheek with mild erythema this morning though she does tend to rub both cheeks. At times she can feel some tingling/ burning but both cheeks not 1. No rash She has not had any significant cough but is having postnasal drip.    Denies any chest pain palpitations nausea vomiting or diarrhea.         Allergies: reviewed in EMR  Medications: reviewed in EMR  Problem List/PMH: reviewed in EMR    Social Hx:  Social History     Substance Use Topics       Smoking status: Never Smoker     Smokeless tobacco: Never Used     Alcohol use No     Social History Narrative    She is substitute teaching full time.      3 children, .    Tobacco use-none.  Alcohol use-none.    Updated Status  2013    Pre loaded 13                        Family Hx:   Family History       Problem   Relation Age of Onset     Hypertension  Father      Heart Disease  Father      Stroke  Father 78     Massive heart attack  age 78       Hypertension  Mother      Heart Disease  Mother 85     Mother  at age 85 of hypertension and heart disease, was born with a cataract/glaucoma.        Stroke  Maternal Grandfather       stroke and heart attack.       Heart Disease  Maternal Grandfather      Cancer-colon  Maternal Uncle       in 80s with colon cancer       Other  Maternal Uncle      Hodgkin's       Heart Disease  Brother      Cancer-breast  Sister 43     Cancer-breast  Maternal Aunt      Good Health  Child      Good Health  Child      Good Health  Child      Good Health  Other      Spouse.         Objective:  Visit Vitals       /78     Pulse 92     Wt 68.9 kg (151 lb 12.8 oz)     Breastfeeding No     BMI 24.5 kg/m2       patient is alert oriented ×3 mildly ill in appearance PERRLA EOMI TMs are distended on left not right oropharynx postnasal drip. Moist mucous membranes she is tender with palpation over her left maxillary sinus goes into her teeth. When looking at her sinuses she has a little bit of fullness and erythema over her left cheek compared her right but no blisters noted no vesicles. Neck is supple without adenopathy. Lungs clear heart sounds regular abdomen soft nontender     Assessment:    ICD-10-CM    1. Need for influenza vaccination Z23 FLU VACCINE => 65 YRS HIGH DOSE TRIVALENT IIV3 IM      [place] ADMIN VACC INITIAL   2. Acute non-recurrent maxillary sinusitis J01.00 cephalexin (KEFLEX) 500 mg capsule            Plan:   -- Expected clinical course discussed   -- Medications and their side effects discussed  Patient Instructions   Sinusitis (sinus infection)    If using antibiotic, then you will need probiotic to take 2 hours after  Dose.   Consider Florastor or Vesna gonzalez over the  counter.      Cephalexin is your antibiotic ;  - flu shot updated today     What you should do:    If you were prescribed antibiotics, take them until they are gone. Do not skip a dose.    Wash your hands often with soap and water    Get plenty of rest and fluids    Apply warm compresses to your face    Use sinus rinses, a humidifier or a vaporizer to add more moisture to your sinus tissues.    Think about using a Neti pot    Take acetaminophen (Tylenol ) or ibuprofen (Advil ) for pain    How will you know this plan is not working - warning signs you should watch for:    Pain or swelling around your eyes    Swollen, painful forehead and/or facial (sinus) pain    When should you be seen again?    If you develop severe headache, double vision, stiff neck or confusion, return right away.    If you have any of the warning signs listed above, return in 1 to 2 days.    If your symptoms do not get better in 7 to 10 days, return at that time.    Who should you see if the plan is not working?    Make an appointment to see me or return to your clinic    For more information about sinusitis, go to:  [email or web address]         Electronically signed by Judy Garcia MD

## 2018-01-03 NOTE — PATIENT INSTRUCTIONS
Patient Information     Patient Name MRN Sex Verena Montero [id number] Female 1940      Patient Instructions by Judy Garcia MD at 2017 10:15 AM     Author:  Judy Garcia MD  Service:  (none) Author Type:  Physician     Filed:  2017 10:58 AM  Encounter Date:  2017 Status:  Addendum     :  Judy Garcia MD (Physician)        Related Notes: Original Note by Judy Garcia MD (Physician) filed at 2017 10:57 AM            Sinusitis (sinus infection)    If using antibiotic, then you will need probiotic to take 2 hours after  Dose.   Consider Florastor or Vesna smoothie over the counter.      Cephalexin is your antibiotic ;  - flu shot updated today     What you should do:    If you were prescribed antibiotics, take them until they are gone. Do not skip a dose.    Wash your hands often with soap and water    Get plenty of rest and fluids    Apply warm compresses to your face    Use sinus rinses, a humidifier or a vaporizer to add more moisture to your sinus tissues.    Think about using a Neti pot    Take acetaminophen (Tylenol ) or ibuprofen (Advil ) for pain    How will you know this plan is not working - warning signs you should watch for:    Pain or swelling around your eyes    Swollen, painful forehead and/or facial (sinus) pain    When should you be seen again?    If you develop severe headache, double vision, stiff neck or confusion, return right away.    If you have any of the warning signs listed above, return in 1 to 2 days.    If your symptoms do not get better in 7 to 10 days, return at that time.    Who should you see if the plan is not working?    Make an appointment to see me or return to your clinic    For more information about sinusitis, go to:  [email or web address]

## 2018-01-24 ENCOUNTER — DOCUMENTATION ONLY (OUTPATIENT)
Dept: FAMILY MEDICINE | Facility: OTHER | Age: 78
End: 2018-01-24

## 2018-01-24 PROBLEM — M25.819 OTHER AFFECTIONS OF SHOULDER REGION, NOT ELSEWHERE CLASSIFIED: Status: ACTIVE | Noted: 2018-01-24

## 2018-01-24 PROBLEM — M06.9 ARTHRITIS, RHEUMATOID (H): Status: ACTIVE | Noted: 2018-01-24

## 2018-01-24 PROBLEM — J30.9 ALLERGIC RHINITIS: Status: ACTIVE | Noted: 2018-01-24

## 2018-01-24 PROBLEM — R53.83 MALAISE AND FATIGUE: Status: ACTIVE | Noted: 2018-01-24

## 2018-01-24 PROBLEM — M35.00 SJOGREN'S SYNDROME (H): Status: ACTIVE | Noted: 2018-01-24

## 2018-01-24 PROBLEM — N60.09 SOLITARY CYST OF BREAST: Status: ACTIVE | Noted: 2018-01-24

## 2018-01-24 PROBLEM — R53.81 MALAISE AND FATIGUE: Status: ACTIVE | Noted: 2018-01-24

## 2018-01-24 PROBLEM — M47.812 SPONDYLOSIS, CERVICAL: Status: ACTIVE | Noted: 2018-01-24

## 2018-01-24 PROBLEM — M54.50 LUMBAGO: Status: ACTIVE | Noted: 2018-01-24

## 2018-01-24 RX ORDER — FOLIC ACID 1 MG/1
2 TABLET ORAL DAILY
COMMUNITY

## 2018-01-24 RX ORDER — ASPIRIN 81 MG/1
81 TABLET, CHEWABLE ORAL
COMMUNITY
Start: 2017-09-14 | End: 2020-03-20

## 2018-01-24 RX ORDER — PREDNISONE 1 MG/1
1 TABLET ORAL
COMMUNITY
Start: 2017-09-15 | End: 2022-03-08

## 2018-01-24 RX ORDER — VITAMIN A 2400 MCG
CAPSULE ORAL
COMMUNITY
End: 2018-05-25

## 2018-01-24 RX ORDER — MULTIVIT WITH MINERALS/LUTEIN
TABLET ORAL
COMMUNITY
End: 2018-05-25

## 2018-01-24 RX ORDER — CYCLOSPORINE 0.5 MG/ML
EMULSION OPHTHALMIC
COMMUNITY
Start: 2015-09-21 | End: 2018-05-25

## 2018-01-26 VITALS
BODY MASS INDEX: 23.95 KG/M2 | SYSTOLIC BLOOD PRESSURE: 110 MMHG | HEART RATE: 92 BPM | DIASTOLIC BLOOD PRESSURE: 78 MMHG | WEIGHT: 151.8 LBS

## 2018-01-26 VITALS
HEART RATE: 68 BPM | SYSTOLIC BLOOD PRESSURE: 130 MMHG | WEIGHT: 153 LBS | BODY MASS INDEX: 24.14 KG/M2 | DIASTOLIC BLOOD PRESSURE: 72 MMHG

## 2018-01-26 VITALS
HEART RATE: 74 BPM | TEMPERATURE: 98.1 F | HEIGHT: 66 IN | DIASTOLIC BLOOD PRESSURE: 76 MMHG | SYSTOLIC BLOOD PRESSURE: 120 MMHG | WEIGHT: 153.4 LBS | BODY MASS INDEX: 24.65 KG/M2

## 2018-01-26 VITALS
BODY MASS INDEX: 24.3 KG/M2 | HEART RATE: 68 BPM | WEIGHT: 154 LBS | SYSTOLIC BLOOD PRESSURE: 112 MMHG | DIASTOLIC BLOOD PRESSURE: 72 MMHG

## 2018-02-08 ENCOUNTER — DOCUMENTATION ONLY (OUTPATIENT)
Dept: FAMILY MEDICINE | Facility: OTHER | Age: 78
End: 2018-02-08

## 2018-02-08 RX ORDER — METHOTREXATE SODIUM 25 MG/ML
INJECTION, SOLUTION INTRA-ARTERIAL; INTRAMUSCULAR; INTRATHECAL; INTRAVENOUS
COMMUNITY
Start: 2017-09-14 | End: 2020-12-18

## 2018-03-08 ENCOUNTER — HOSPITAL ENCOUNTER (OUTPATIENT)
Dept: MAMMOGRAPHY | Facility: OTHER | Age: 78
Discharge: HOME | End: 2018-03-08
Attending: FAMILY MEDICINE | Admitting: FAMILY MEDICINE
Payer: MEDICARE

## 2018-03-08 DIAGNOSIS — Z12.39 SCREENING BREAST EXAMINATION: ICD-10-CM

## 2018-03-08 PROCEDURE — 77067 SCR MAMMO BI INCL CAD: CPT

## 2018-04-10 ENCOUNTER — OFFICE VISIT (OUTPATIENT)
Dept: FAMILY MEDICINE | Facility: OTHER | Age: 78
End: 2018-04-10
Attending: FAMILY MEDICINE
Payer: MEDICARE

## 2018-04-10 ENCOUNTER — HOSPITAL ENCOUNTER (OUTPATIENT)
Dept: GENERAL RADIOLOGY | Facility: OTHER | Age: 78
Discharge: HOME | End: 2018-04-10
Attending: FAMILY MEDICINE | Admitting: FAMILY MEDICINE
Payer: MEDICARE

## 2018-04-10 VITALS
WEIGHT: 153 LBS | DIASTOLIC BLOOD PRESSURE: 68 MMHG | SYSTOLIC BLOOD PRESSURE: 122 MMHG | HEART RATE: 72 BPM | TEMPERATURE: 97.9 F | BODY MASS INDEX: 25.07 KG/M2 | OXYGEN SATURATION: 95 %

## 2018-04-10 DIAGNOSIS — R06.02 SOB (SHORTNESS OF BREATH): ICD-10-CM

## 2018-04-10 DIAGNOSIS — R05.9 COUGH: ICD-10-CM

## 2018-04-10 DIAGNOSIS — R05.9 COUGH: Primary | ICD-10-CM

## 2018-04-10 DIAGNOSIS — Z79.899 HIGH RISK MEDICATION USE: ICD-10-CM

## 2018-04-10 DIAGNOSIS — M05.79 RHEUMATOID ARTHRITIS OF MULTIPLE SITES WITHOUT ORGAN OR SYSTEM INVOLVEMENT WITH POSITIVE RHEUMATOID FACTOR (H): ICD-10-CM

## 2018-04-10 LAB
ALBUMIN SERPL-MCNC: 4.3 G/DL (ref 3.5–5.7)
ALT SERPL W P-5'-P-CCNC: 16 U/L (ref 7–52)
CREAT SERPL-MCNC: 1.12 MG/DL (ref 0.6–1.2)
CRP SERPL-MCNC: 0 MG/L
ERYTHROCYTE [DISTWIDTH] IN BLOOD BY AUTOMATED COUNT: 13.8 % (ref 10–15)
ERYTHROCYTE [SEDIMENTATION RATE] IN BLOOD BY WESTERGREN METHOD: 16 MM/H (ref 1–15)
GFR SERPL CREATININE-BSD FRML MDRD: 47 ML/MIN/1.7M2
HCT VFR BLD AUTO: 38.8 % (ref 35–47)
HGB BLD-MCNC: 13 G/DL (ref 11.7–15.7)
MCH RBC QN AUTO: 32.3 PG (ref 26.5–33)
MCHC RBC AUTO-ENTMCNC: 33.5 G/DL (ref 31.5–36.5)
MCV RBC AUTO: 96 FL (ref 78–100)
NT-PROBNP SERPL-MCNC: 56 PG/ML (ref 0–100)
PLATELET # BLD AUTO: 276 10E9/L (ref 150–450)
RBC # BLD AUTO: 4.03 10E12/L (ref 3.8–5.2)
WBC # BLD AUTO: 6.1 10E9/L (ref 4–11)

## 2018-04-10 PROCEDURE — G0463 HOSPITAL OUTPT CLINIC VISIT: HCPCS

## 2018-04-10 PROCEDURE — 71046 X-RAY EXAM CHEST 2 VIEWS: CPT

## 2018-04-10 PROCEDURE — 99213 OFFICE O/P EST LOW 20 MIN: CPT | Performed by: FAMILY MEDICINE

## 2018-04-10 PROCEDURE — 83880 ASSAY OF NATRIURETIC PEPTIDE: CPT | Performed by: FAMILY MEDICINE

## 2018-04-10 PROCEDURE — 36415 COLL VENOUS BLD VENIPUNCTURE: CPT | Performed by: FAMILY MEDICINE

## 2018-04-10 PROCEDURE — 85027 COMPLETE CBC AUTOMATED: CPT | Performed by: FAMILY MEDICINE

## 2018-04-10 PROCEDURE — 82040 ASSAY OF SERUM ALBUMIN: CPT

## 2018-04-10 PROCEDURE — 82565 ASSAY OF CREATININE: CPT

## 2018-04-10 PROCEDURE — 84460 ALANINE AMINO (ALT) (SGPT): CPT

## 2018-04-10 PROCEDURE — G0463 HOSPITAL OUTPT CLINIC VISIT: HCPCS | Mod: 25

## 2018-04-10 PROCEDURE — 85652 RBC SED RATE AUTOMATED: CPT

## 2018-04-10 PROCEDURE — 86140 C-REACTIVE PROTEIN: CPT

## 2018-04-10 RX ORDER — AZITHROMYCIN 250 MG/1
TABLET, FILM COATED ORAL
Qty: 6 TABLET | Refills: 0 | Status: SHIPPED | OUTPATIENT
Start: 2018-04-10 | End: 2018-05-25

## 2018-04-10 ASSESSMENT — PAIN SCALES - GENERAL: PAINLEVEL: NO PAIN (0)

## 2018-04-10 NOTE — MR AVS SNAPSHOT
After Visit Summary   4/10/2018    Verena Wise    MRN: [id number]           Patient Information     Date Of Birth          1940        Visit Information        Provider Department      4/10/2018 11:45 AM Judy Garcia MD [facility]        Today's Diagnoses     Cough    -  1    SOB (shortness of breath)        Rheumatoid arthritis of multiple sites without organ or system involvement with positive rheumatoid factor (H)        High risk medication use          Care Instructions    Keep up on fluids ( water, broth, propel, gatorade, tea )   Delsym is over the counter cough syrup  I would recommend seeing Dr. Cecile Hopson in internal medicine or Hillary Collazo NP         Bronchitis, Antibiotic Treatment (Adult)    Bronchitis is an infection of the air passages (bronchial tubes) in your lungs. It often occurs when you have a cold. This illness is contagious during the first few days and is spread through the air by coughing and sneezing, or by direct contact (touching the sick person and then touching your own eyes, nose, or mouth).  Symptoms of bronchitis include cough with mucus (phlegm) and low-grade fever. Bronchitis usually lasts 7 to 14 days. Mild cases can be treated with simple home remedies. More severe infection is treated with an antibiotic.  Home care  Follow these guidelines when caring for yourself at home:    If your symptoms are severe, rest at home for the first 2 to 3 days. When you go back to your usual activities, don't let yourself get too tired.    Do not smoke. Also avoid being exposed to secondhand smoke.    You may use over-the-counter medicines to control fever or pain, unless another medicine was prescribed. (Note: If you have chronic liver or kidney disease or have ever had a stomach ulcer or gastrointestinal bleeding, talk with your healthcare provider before using these medicines. Also talk to your provider if you are taking medicine to  prevent blood clots.) Aspirin should never be given to anyone younger than 18 years of age who is ill with a viral infection or fever. It may cause severe liver or brain damage.    Your appetite may be poor, so a light diet is fine. Avoid dehydration by drinking 6 to 8 glasses of fluids per day (such as water, soft drinks, sports drinks, juices, tea, or soup). Extra fluids will help loosen secretions in the nose and lungs.    Over-the-counter cough, cold, and sore-throat medicines will not shorten the length of the illness, but they may be helpful to reduce symptoms. (Note: Do not use decongestants if you have high blood pressure.)    Finish all antibiotic medicine. Do this even if you are feeling better after only a few days.  Follow-up care  Follow up with your healthcare provider, or as advised. If you had an X-ray or ECG (electrocardiogram), a specialist will review it. You will be notified of any new findings that may affect your care.  Note: If you are age 65 or older, or if you have a chronic lung disease or condition that affects your immune system, or you smoke, talk to your healthcare provider about having pneumococcal vaccinations and a yearly influenza vaccination (flu shot).  When to seek medical advice  Call your healthcare provider right away if any of these occur:    Fever of 100.4 F (38 C) or higher    Coughing up increased amounts of colored sputum    Weakness, drowsiness, headache, facial pain, ear pain, or a stiff neck  Call 911, or get immediate medical care  Contact emergency services right away if any of these occur.    Coughing up blood    Worsening weakness, drowsiness, headache, or stiff neck    Trouble breathing, wheezing, or pain with breathing  Date Last Reviewed: 9/13/2015 2000-2017 [employer]. [address] [place] [address], [place] [address]. All rights reserved. This information is not intended as a substitute for professional medical care. Always follow your healthcare  professional's instructions.                Follow-ups after your visit        Follow-up notes from your care team     Return in about 3 months (around 7/10/2018).      Future tests that were ordered for you today     Open Future Orders        Priority Expected Expires Ordered    XR Chest 2 Views Routine 4/10/2018 4/10/2019 4/10/2018            Who to contact     If you have questions or need follow up information about today's clinic visit or your schedule please contact [facility] directly at [phone].  Normal or non-critical lab and imaging results will be communicated to you by [employer], letter or phone within 4 business days after the clinic has received the results. If you do not hear from us within 7 days, please contact the clinic through [employer] or phone. If you have a critical or abnormal lab result, we will notify you by phone as soon as possible.  Submit refill requests through [employer] or call your pharmacy and they will forward the refill request to us. Please allow 3 business days for your refill to be completed.          Additional Information About Your Visit        [employer] Information     [employer] gives you secure access to your electronic health record. If you see a primary care provider, you can also send messages to your care team and make appointments. If you have questions, please call your primary care clinic.  If you do not have a primary care provider, please call [phone] and they will assist you.        Care EveryWhere ID     This is your Care EveryWhere ID. This could be used by other organizations to access your [facility] medical records  [id number]        Your Vitals Were     Pulse Temperature Pulse Oximetry Breastfeeding? BMI (Body Mass Index)       72 97.9  F (36.6  C) (Tympanic) 95% No 25.07 kg/m2        Blood Pressure from Last 3 Encounters:   04/10/18 122/68   11/29/17 112/72   10/20/17 130/72    Weight from Last 3 Encounters:   04/10/18 153 lb (69.4  kg)   11/29/17 154 lb (69.9 kg)   10/20/17 153 lb (69.4 kg)              We Performed the Following     Albumin level     ALT     BNP-N terminal pro     CBC with platelets     Creatinine     CRP, inflammation     ESR: Erythrocyte sedimentation rate          Today's Medication Changes          These changes are accurate as of 4/10/18  1:19 PM.  If you have any questions, ask your nurse or doctor.               Start taking these medicines.        Dose/Directions    azithromycin 250 MG tablet   Commonly known as:  ZITHROMAX   Used for:  Cough   Started by:  Judy Garcia MD        Two tablets first day, then one tablet daily for four days.   Quantity:  6 tablet   Refills:  0            Where to get your medicines      These medications were sent to [facility] [address] [address]     Phone:  [phone]     azithromycin 250 MG tablet                Primary Care Provider Office Phone # Fax #    Judy Garcia [phone]       [address] [facility] [address]        Equal Access to Services     [facility]: Hadii david ku hadasho Soomaali, waaxda luqadaha, qaybta kaalmada adeegyada, waxay santiagoin hayeligio leon . So [facility] [phone].    ATENCIÓN: Si habla español, tiene a steinberg disposición servicios gratuitos de asistencia lingüística. Llame al [phone].    We comply with applicable federal civil rights laws and [place] laws. We do not discriminate on the basis of race, color, national origin, age, disability, sex, sexual orientation, or gender identity.            Thank you!     Thank you for choosing [facility] AND \[facility]\""  for your care. Our goal is always to provide you with excellent care. Hearing back from our patients is one way we can continue to improve our services. Please take a few minutes to complete the written survey that you may receive in the mail after your  visit with us. Thank you!             Your Updated Medication List - Protect others around you: Learn how to safely use, store and throw away your medicines at www.disposemymeds.org.          This list is accurate as of 4/10/18  1:19 PM.  Always use your most recent med list.                   Brand Name Dispense Instructions for use Diagnosis    ascorbic acid 1000 MG Tabs    vitamin C          aspirin 81 MG chewable tablet      Take 81 mg by mouth daily with food        azithromycin 250 MG tablet    ZITHROMAX    6 tablet    Two tablets first day, then one tablet daily for four days.    Cough       CALCIUM-VITAMIN D PO      Take 2 capsules by mouth        Flaxseed Oil Oil      As directed once daily.        folic acid 1 MG tablet    FOLVITE     Take 2 mg by mouth daily        Ginger Extract 250 MG Caps           methotrexate (PF) 25 MG/ML Soln           predniSONE 1 MG tablet    DELTASONE     Take 1 mg by mouth daily with food        PROBIOTIC ACIDOPHILUS PO           RESTASIS 0.05 % ophthalmic emulsion   Generic drug:  cycloSPORINE           SALMON OIL-1000 PO           Turmeric Curcumin 500 MG Caps           VOLTAREN 1 % Gel topical gel   Generic drug:  diclofenac      Apply to joints as needed.

## 2018-04-10 NOTE — PATIENT INSTRUCTIONS
Keep up on fluids ( water, broth, propel, gatorade, tea )   Delsym is over the counter cough syrup  I would recommend seeing Dr. Cecile Hopson in internal medicine or Hillary Collazo NP         Bronchitis, Antibiotic Treatment (Adult)    Bronchitis is an infection of the air passages (bronchial tubes) in your lungs. It often occurs when you have a cold. This illness is contagious during the first few days and is spread through the air by coughing and sneezing, or by direct contact (touching the sick person and then touching your own eyes, nose, or mouth).  Symptoms of bronchitis include cough with mucus (phlegm) and low-grade fever. Bronchitis usually lasts 7 to 14 days. Mild cases can be treated with simple home remedies. More severe infection is treated with an antibiotic.  Home care  Follow these guidelines when caring for yourself at home:    If your symptoms are severe, rest at home for the first 2 to 3 days. When you go back to your usual activities, don't let yourself get too tired.    Do not smoke. Also avoid being exposed to secondhand smoke.    You may use over-the-counter medicines to control fever or pain, unless another medicine was prescribed. (Note: If you have chronic liver or kidney disease or have ever had a stomach ulcer or gastrointestinal bleeding, talk with your healthcare provider before using these medicines. Also talk to your provider if you are taking medicine to prevent blood clots.) Aspirin should never be given to anyone younger than 18 years of age who is ill with a viral infection or fever. It may cause severe liver or brain damage.    Your appetite may be poor, so a light diet is fine. Avoid dehydration by drinking 6 to 8 glasses of fluids per day (such as water, soft drinks, sports drinks, juices, tea, or soup). Extra fluids will help loosen secretions in the nose and lungs.    Over-the-counter cough, cold, and sore-throat medicines will not shorten the length of the illness, but they  may be helpful to reduce symptoms. (Note: Do not use decongestants if you have high blood pressure.)    Finish all antibiotic medicine. Do this even if you are feeling better after only a few days.  Follow-up care  Follow up with your healthcare provider, or as advised. If you had an X-ray or ECG (electrocardiogram), a specialist will review it. You will be notified of any new findings that may affect your care.  Note: If you are age 65 or older, or if you have a chronic lung disease or condition that affects your immune system, or you smoke, talk to your healthcare provider about having pneumococcal vaccinations and a yearly influenza vaccination (flu shot).  When to seek medical advice  Call your healthcare provider right away if any of these occur:    Fever of 100.4 F (38 C) or higher    Coughing up increased amounts of colored sputum    Weakness, drowsiness, headache, facial pain, ear pain, or a stiff neck  Call 911, or get immediate medical care  Contact emergency services right away if any of these occur.    Coughing up blood    Worsening weakness, drowsiness, headache, or stiff neck    Trouble breathing, wheezing, or pain with breathing  Date Last Reviewed: 9/13/2015 2000-2017 [employer]. [address] [place] [address], [place] [address]. All rights reserved. This information is not intended as a substitute for professional medical care. Always follow your healthcare professional's instructions.

## 2018-04-10 NOTE — PROGRESS NOTES
Nursing Notes:   Kimberly Kelley LPN  4/10/2018 11:35 AM  Unsigned  Patient presents with dry cough off and on X few months.   Kimberly Kelley LPN........................4/10/2018  11:35 AM       Subjective:  Verena Wise is a 77 year old female who presents for persistent cough     Patient is a 77-year-old white female with history of Sjogren's who comes in for persistent cough.  She volunteers at the hospital.  She has been exposed to a variety of illnesses.  She does have her flu shot for this year.  No significant temps but often does not have an elevated temperature. At times cough makes her short of breath.  She has intermittent discomfort in right lower rib when she pushes on it.  No nausea, vomiting or diarrhea          Allergies   Allergen Reactions     Codeine Nausea     Other reaction(s): Dizziness        Current Outpatient Prescriptions   Medication     VOLTAREN 1 % GEL topical gel     CALCIUM-VITAMIN D PO     Flaxseed Oil OIL     Lactobacillus (PROBIOTIC ACIDOPHILUS PO)     Methotrexate, Anti-Rheumatic, (METHOTREXATE, PF,) 25 MG/ML SOLN     Omega-3 Fatty Acids (SALMON OIL-1000 PO)     ascorbic acid (VITAMIN C) 1000 MG TABS     aspirin 81 MG chewable tablet     folic acid (FOLVITE) 1 MG tablet     Rosalva, Zingiber officinalis, (ROSALVA EXTRACT) 250 MG CAPS     predniSONE (DELTASONE) 1 MG tablet     cycloSPORINE (RESTASIS) 0.05 % ophthalmic emulsion     Turmeric Curcumin 500 MG CAPS     No current facility-administered medications for this visit.      Patient Active Problem List   Diagnosis     Allergic rhinitis     Arthritis, rheumatoid (H)     Lumbago     Borderline high cholesterol     Solitary cyst of breast     Chronic steroid use     Malaise and fatigue     Pain in joint, pelvic region and thigh     Lactose intolerance     Disorder of bone and cartilage     Other affections of shoulder region, not elsewhere classified     Sjogren's syndrome (H)     Spondylosis, cervical     Phlebitis and  thrombophlebitis of superficial vessels of lower extremities     Varices of other sites         Social Hx:  Social History   Substance Use Topics     Smoking status: Never Smoker     Smokeless tobacco: Never Used     Alcohol use No       Family Hx:   Family History   Problem Relation Age of Onset     Hypertension Father      Hypertension     HEART DISEASE Father      Heart Disease     Other - See Comments Father 78     Stroke,Massive heart attack  age 78     Hypertension Mother      Hypertension     HEART DISEASE Mother 85     Heart Disease,Mother  at age 85 of hypertension and heart disease, was born with a cataract/glaucoma.     Other - See Comments Maternal Grandfather      Stroke, stroke and heart attack.     HEART DISEASE Maternal Grandfather      Heart Disease     Colon Cancer Maternal Uncle      Cancer-colon, in 80s with colon cancer     Other - See Comments Maternal Uncle      Hodgkin's     HEART DISEASE Brother      Heart Disease     Breast Cancer Sister 43     Cancer-breast     Breast Cancer Maternal Aunt      Cancer-breast     Family History Negative Child      Good Health     Family History Negative Child      Good Health     Family History Negative Child      Good Health     Family History Negative Other      Good Health,Spouse.       Objective:  /68 (BP Location: Right arm, Patient Position: Sitting, Cuff Size: Adult Large)  Pulse 72  Temp 97.9  F (36.6  C) (Tympanic)  Wt 153 lb (69.4 kg)  SpO2 95%  Breastfeeding? No  BMI 25.07 kg/m2    she is alert and oriented times three.  NAD   ILDEFONSO  TM's retracted  OP with post nasal drip/ cobblestone patter;  Sinus maxillary minimal tenderness with palpation ; she clears her throat throughout exam   Neck is supple without adenopathy   Lungs clear;  CVreg 2/6 INNA at RUSB;  No radiate  Ext trace edema bilaterally, left slightly more than right at sock indentation line  Skin is warm.     Results for orders placed or performed in visit on  04/10/18   CBC with platelets   Result Value Ref Range    WBC 6.1 4.0 - 11.0 10e9/L    RBC Count 4.03 3.8 - 5.2 10e12/L    Hemoglobin 13.0 11.7 - 15.7 g/dL    Hematocrit 38.8 35.0 - 47.0 %    MCV 96 78 - 100 fl    MCH 32.3 26.5 - 33.0 pg    MCHC 33.5 31.5 - 36.5 g/dL    RDW 13.8 10.0 - 15.0 %    Platelet Count 276 150 - 450 10e9/L   BNP-N terminal pro   Result Value Ref Range    N-Terminal Pro Bnp 56 0 - 100 pg/mL        HISTORY: ; Cough, .     COMPARISON:  3/4/15     FINDINGS:  The cardiomediastinal contours are unchanged.  The trachea is midline.   There is calcific aortic atherosclerosis.  Extensive costochondral calcifications are present. The lungs are  borderline hyperinflated. No discrete consolidation, effusion or  pneumothorax is seen.    No suspicious osseous lesion or subdiaphragmatic free air.         IMPRESSION:       Air trapping. Stable chest.       ENRRIQUE LEMOS MD  Assessment:    ICD-10-CM    1. Cough R05 CBC with platelets     XR Chest 2 Views     ALT     Albumin level     CRP, inflammation     Creatinine     ESR: Erythrocyte sedimentation rate   2. SOB (shortness of breath) R06.02 BNP-N terminal pro     ALT     Albumin level     CRP, inflammation     Creatinine     ESR: Erythrocyte sedimentation rate   3. Rheumatoid arthritis of multiple sites without organ or system involvement with positive rheumatoid factor (H) M05.79 ALT     Albumin level     CRP, inflammation     Creatinine     ESR: Erythrocyte sedimentation rate   4. High risk medication use Z79.899 ALT     Albumin level     CRP, inflammation     Creatinine     ESR: Erythrocyte sedimentation rate          Plan:   -- Expected clinical course discussed   -- Medications and their side effects discussed  Patient Instructions   Keep up on fluids ( water, broth, propel, gatorade, tea )   Delsym is over the counter cough syrup  I would recommend seeing Dr. Cecile Hopson in internal medicine or LILIA Underwood,  MD

## 2018-04-10 NOTE — NURSING NOTE
Patient presents with dry cough off and on X few months.   Kimberly Kelley LPN........................4/10/2018  11:35 AM

## 2018-05-25 ENCOUNTER — OFFICE VISIT (OUTPATIENT)
Dept: INTERNAL MEDICINE | Facility: OTHER | Age: 78
End: 2018-05-25
Attending: NURSE PRACTITIONER
Payer: COMMERCIAL

## 2018-05-25 VITALS
TEMPERATURE: 95.9 F | SYSTOLIC BLOOD PRESSURE: 132 MMHG | BODY MASS INDEX: 24.57 KG/M2 | HEIGHT: 66 IN | DIASTOLIC BLOOD PRESSURE: 84 MMHG | WEIGHT: 152.9 LBS | HEART RATE: 76 BPM

## 2018-05-25 DIAGNOSIS — M06.9 RHEUMATOID ARTHRITIS INVOLVING MULTIPLE SITES, UNSPECIFIED RHEUMATOID FACTOR PRESENCE: Primary | ICD-10-CM

## 2018-05-25 DIAGNOSIS — M35.00 SJOGREN'S SYNDROME, WITH UNSPECIFIED ORGAN INVOLVEMENT (H): ICD-10-CM

## 2018-05-25 DIAGNOSIS — M54.50 CHRONIC MIDLINE LOW BACK PAIN WITHOUT SCIATICA: ICD-10-CM

## 2018-05-25 DIAGNOSIS — E78.9 BORDERLINE HIGH CHOLESTEROL: ICD-10-CM

## 2018-05-25 DIAGNOSIS — M94.0 COSTOCHONDRITIS: ICD-10-CM

## 2018-05-25 DIAGNOSIS — R10.11 RIGHT UPPER QUADRANT PAIN: ICD-10-CM

## 2018-05-25 DIAGNOSIS — G89.29 CHRONIC MIDLINE LOW BACK PAIN WITHOUT SCIATICA: ICD-10-CM

## 2018-05-25 PROCEDURE — G0463 HOSPITAL OUTPT CLINIC VISIT: HCPCS

## 2018-05-25 PROCEDURE — 99215 OFFICE O/P EST HI 40 MIN: CPT | Performed by: NURSE PRACTITIONER

## 2018-05-25 RX ORDER — MULTIVIT WITH MINERALS/LUTEIN
1000 TABLET ORAL DAILY
Qty: 30 TABLET | COMMUNITY
Start: 2018-05-25

## 2018-05-25 RX ORDER — CYCLOSPORINE 0.5 MG/ML
1 EMULSION OPHTHALMIC 2 TIMES DAILY
COMMUNITY
Start: 2018-05-25

## 2018-05-25 RX ORDER — VITAMIN A 2400 MCG
CAPSULE ORAL
Qty: 120 CAPSULE | COMMUNITY
Start: 2018-05-25

## 2018-05-25 ASSESSMENT — ENCOUNTER SYMPTOMS
HEMATOCHEZIA: 0
COUGH: 0
HEADACHES: 0
WHEEZING: 0
NAUSEA: 0
EYE DISCHARGE: 0
WOUND: 0
PALPITATIONS: 0
FREQUENCY: 0
FEVER: 0
EYE REDNESS: 0
POLYPHAGIA: 0
VOMITING: 0
HEARTBURN: 0
SORE THROAT: 0
CHILLS: 0
DYSURIA: 0
MYALGIAS: 0
CONFUSION: 0
DYSPHORIC MOOD: 0
ADENOPATHY: 0
UNEXPECTED WEIGHT CHANGE: 0
POLYDIPSIA: 0
DIZZINESS: 0
DIARRHEA: 0
SHORTNESS OF BREATH: 0
PARESTHESIAS: 0
ARTHRALGIAS: 0
CONSTIPATION: 0
ABDOMINAL PAIN: 1
NUMBNESS: 0
NERVOUS/ANXIOUS: 0

## 2018-05-25 ASSESSMENT — PAIN SCALES - GENERAL: PAINLEVEL: NO PAIN (0)

## 2018-05-25 NOTE — NURSING NOTE
Patient is here to establish care. She states that she has some upper abdominal tenderness at times.   Jazzmine Mora LPN...................5/25/2018   9:13 AM
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
## 2018-05-25 NOTE — MR AVS SNAPSHOT
After Visit Summary   5/25/2018    Verena Wise    MRN: [id number]           Patient Information     Date Of Birth          1940        Visit Information        Provider Department      5/25/2018 9:00 AM Kaia Quinn NP [facility]        Today's Diagnoses     Rheumatoid arthritis involving multiple sites, unspecified rheumatoid factor presence (H)    -  1    Sjogren's syndrome, with unspecified organ involvement (H)        Chronic midline low back pain without sciatica        Borderline high cholesterol        Right upper quadrant pain        Costochondritis           Follow-ups after your visit        Your next 10 appointments already scheduled     May 29, 2018  7:30 AM CDT   US ABDOMEN LIMITED with GHUS1   [facility] ([facility])    [address]  [place] [address] [phone]           Please bring a list of your medicines (including vitamins, minerals and over-the-counter drugs). Also, tell your doctor about any allergies you may have. Wear comfortable clothes and leave your valuables at home.  Adults: No eating or drinking for 8 hours before the exam. You may take medicine with a small sip of water.  Children: - Children 6+ years: No food or drink for 6 hours before exam. - Children 1-5 years: No food or drink for 4 hours before exam. - Infants, breast-fed: may have breast milk up to 2 hours before exam. - Infants, formula: may have bottle until 4 hours before exam.  Please call the Imaging Department at your exam site with any questions.              Future tests that were ordered for you today     Open Future Orders        Priority Expected Expires Ordered    US Abdomen Limited Routine  5/25/2019 5/25/2018            Who to contact     If you have questions or need follow up information about today's clinic visit or your schedule please contact [facility] directly at  "[phone].  Normal or non-critical lab and imaging results will be communicated to you by [employer]erve[employer], letter or phone within 4 business days after the clinic has received the results. If you do not hear from us within 7 days, please contact the clinic through iKaaz[employer] or phone. If you have a critical or abnormal lab result, we will notify you by phone as soon as possible.  Submit refill requests through [employer] or call your pharmacy and they will forward the refill request to us. Please allow 3 business days for your refill to be completed.          Additional Information About Your Visit        DediServehart Information     [employer] gives you secure access to your electronic health record. If you see a primary care provider, you can also send messages to your care team and make appointments. If you have questions, please call your primary care clinic.  If you do not have a primary care provider, please call [phone] and they will assist you.        Care EveryWhere ID     This is your Care EveryWhere ID. This could be used by other organizations to access your [facility] medical records  [id number]        Your Vitals Were     Pulse Temperature Height Breastfeeding? BMI (Body Mass Index)       76 95.9  F (35.5  C) (Tympanic) 1.676 m (5' 6\") No 24.68 kg/m2        Blood Pressure from Last 3 Encounters:   05/25/18 132/84   04/10/18 122/68   11/29/17 112/72    Weight from Last 3 Encounters:   05/25/18 69.4 kg (152 lb 14.4 oz)   04/10/18 69.4 kg (153 lb)   11/29/17 69.9 kg (154 lb)                 Today's Medication Changes          These changes are accurate as of 5/25/18  9:53 AM.  If you have any questions, ask your nurse or doctor.               These medicines have changed or have updated prescriptions.        Dose/Directions    ascorbic acid 1000 MG Tabs   Commonly known as:  vitamin C   This may have changed:  when to take this   Changed by:  Kaia Quinn, NP        Take by mouth daily   Quantity:  30 tablet "   Refills:  0       Rosalva Extract 250 MG Caps   This may have changed:    - how to take this  - additional instructions   Changed by:  Kaia Quinn NP        Take 1 capsule by mouth daily   Quantity:  120 capsule   Refills:  0       RESTASIS 0.05 % ophthalmic emulsion   This may have changed:    - how much to take  - how to take this  - when to take this   Generic drug:  cycloSPORINE   Changed by:  Kaia Quinn NP        Dose:  1 drop   Place 1 drop into both eyes 2 times daily   Refills:  0                Primary Care Provider Office Phone # Fax #    Kaia Quinn [phone]       [address] [facility] [address]        Equal Access to Services     GREY SANTOS : Deana Marie, wacory méndez, qaybmandi kaalmacortez zurita, rachel persaud. [facility] [phone].    ATENCIÓN: Si habla español, tiene a steinberg disposición servicios gratuitos de asistencia lingüística. Llame al [phone].    We comply with applicable federal civil rights laws and [place] laws. We do not discriminate on the basis of race, color, national origin, age, disability, sex, sexual orientation, or gender identity.            Thank you!     Thank you for choosing [facility]  for your care. Our goal is always to provide you with excellent care. Hearing back from our patients is one way we can continue to improve our services. Please take a few minutes to complete the written survey that you may receive in the mail after your visit with us. Thank you!             Your Updated Medication List - Protect others around you: Learn how to safely use, store and throw away your medicines at www.disposemymeds.org.          This list is accurate as of 5/25/18  9:53 AM.  Always use your most recent med list.                   Brand Name Dispense Instructions for use Diagnosis    ascorbic acid 1000 MG Tabs    vitamin C    30 tablet    Take  by mouth daily        aspirin 81 MG chewable tablet      Take 81 mg by mouth daily with food        calcium carbonate-vitamin D 600-400 MG-UNIT Chew     180 tablet    Take 1 chew tab by mouth 2 times daily        Flaxseed Oil Oil      As directed once daily.        folic acid 1 MG tablet    FOLVITE     Take 2 mg by mouth daily        Ginger Extract 250 MG Caps     120 capsule    Take 1 capsule by mouth daily        methotrexate (PF) 25 MG/ML Soln           predniSONE 1 MG tablet    DELTASONE     Take 1 mg by mouth daily with food        PROBIOTIC ACIDOPHILUS PO           RESTASIS 0.05 % ophthalmic emulsion   Generic drug:  cycloSPORINE      Place 1 drop into both eyes 2 times daily        SALMON OIL-1000 PO           Turmeric Curcumin 500 MG Caps           VOLTAREN 1 % Gel topical gel   Generic drug:  diclofenac      Apply to joints as needed.

## 2018-05-26 ASSESSMENT — PATIENT HEALTH QUESTIONNAIRE - PHQ9: SUM OF ALL RESPONSES TO PHQ QUESTIONS 1-9: 0

## 2018-05-29 ENCOUNTER — HOSPITAL ENCOUNTER (OUTPATIENT)
Dept: ULTRASOUND IMAGING | Facility: OTHER | Age: 78
Discharge: HOME | End: 2018-05-29
Attending: NURSE PRACTITIONER | Admitting: NURSE PRACTITIONER
Payer: MEDICARE

## 2018-05-29 DIAGNOSIS — R10.11 RIGHT UPPER QUADRANT PAIN: ICD-10-CM

## 2018-05-29 PROCEDURE — 76705 ECHO EXAM OF ABDOMEN: CPT

## 2018-06-05 ENCOUNTER — TELEPHONE (OUTPATIENT)
Dept: INTERNAL MEDICINE | Facility: OTHER | Age: 78
End: 2018-06-05

## 2018-06-05 NOTE — TELEPHONE ENCOUNTER
Patient is looking for ultrasound results. Please advise.  Donita ALFONSO CMA.......6/5/2018..2:37 PM

## 2018-07-30 DIAGNOSIS — M05.79 RHEUMATOID ARTHRITIS OF MULTIPLE SITES WITHOUT ORGAN OR SYSTEM INVOLVEMENT WITH POSITIVE RHEUMATOID FACTOR (H): Primary | ICD-10-CM

## 2018-07-30 DIAGNOSIS — Z79.899 HIGH RISK MEDICATION USE: ICD-10-CM

## 2018-07-30 LAB
ALBUMIN SERPL-MCNC: 4 G/DL (ref 3.5–5.7)
ALT SERPL W P-5'-P-CCNC: 16 U/L (ref 7–52)
BASOPHILS # BLD AUTO: 0.1 10E9/L (ref 0–0.2)
BASOPHILS NFR BLD AUTO: 1.6 %
CREAT SERPL-MCNC: 1.06 MG/DL (ref 0.6–1.2)
CRP SERPL-MCNC: 0 MG/L
DIFFERENTIAL METHOD BLD: NORMAL
EOSINOPHIL # BLD AUTO: 0.1 10E9/L (ref 0–0.7)
EOSINOPHIL NFR BLD AUTO: 2.4 %
ERYTHROCYTE [DISTWIDTH] IN BLOOD BY AUTOMATED COUNT: 13.6 % (ref 10–15)
ERYTHROCYTE [SEDIMENTATION RATE] IN BLOOD BY WESTERGREN METHOD: 10 MM/H (ref 1–15)
GFR SERPL CREATININE-BSD FRML MDRD: 50 ML/MIN/1.7M2
HCT VFR BLD AUTO: 40.6 % (ref 35–47)
HGB BLD-MCNC: 13.2 G/DL (ref 11.7–15.7)
IMM GRANULOCYTES # BLD: 0 10E9/L (ref 0–0.4)
IMM GRANULOCYTES NFR BLD: 0.4 %
LYMPHOCYTES # BLD AUTO: 1.3 10E9/L (ref 0.8–5.3)
LYMPHOCYTES NFR BLD AUTO: 26.1 %
MCH RBC QN AUTO: 31.4 PG (ref 26.5–33)
MCHC RBC AUTO-ENTMCNC: 32.5 G/DL (ref 31.5–36.5)
MCV RBC AUTO: 96 FL (ref 78–100)
MONOCYTES # BLD AUTO: 0.3 10E9/L (ref 0–1.3)
MONOCYTES NFR BLD AUTO: 6.5 %
NEUTROPHILS # BLD AUTO: 3.1 10E9/L (ref 1.6–8.3)
NEUTROPHILS NFR BLD AUTO: 63 %
PLATELET # BLD AUTO: 245 10E9/L (ref 150–450)
RBC # BLD AUTO: 4.21 10E12/L (ref 3.8–5.2)
WBC # BLD AUTO: 4.9 10E9/L (ref 4–11)

## 2018-07-30 PROCEDURE — 86140 C-REACTIVE PROTEIN: CPT | Performed by: NURSE PRACTITIONER

## 2018-07-30 PROCEDURE — 84460 ALANINE AMINO (ALT) (SGPT): CPT | Performed by: NURSE PRACTITIONER

## 2018-07-30 PROCEDURE — 82040 ASSAY OF SERUM ALBUMIN: CPT | Performed by: NURSE PRACTITIONER

## 2018-07-30 PROCEDURE — 85025 COMPLETE CBC W/AUTO DIFF WBC: CPT | Performed by: NURSE PRACTITIONER

## 2018-07-30 PROCEDURE — 36415 COLL VENOUS BLD VENIPUNCTURE: CPT | Performed by: NURSE PRACTITIONER

## 2018-07-30 PROCEDURE — 82565 ASSAY OF CREATININE: CPT | Performed by: NURSE PRACTITIONER

## 2018-07-30 PROCEDURE — 85652 RBC SED RATE AUTOMATED: CPT | Performed by: NURSE PRACTITIONER

## 2018-08-28 ENCOUNTER — OFFICE VISIT (OUTPATIENT)
Dept: FAMILY MEDICINE | Facility: OTHER | Age: 78
End: 2018-08-28
Attending: FAMILY MEDICINE
Payer: COMMERCIAL

## 2018-08-28 VITALS
DIASTOLIC BLOOD PRESSURE: 76 MMHG | SYSTOLIC BLOOD PRESSURE: 138 MMHG | TEMPERATURE: 97.6 F | WEIGHT: 152.2 LBS | BODY MASS INDEX: 24.57 KG/M2 | HEART RATE: 80 BPM

## 2018-08-28 DIAGNOSIS — L30.9 DERMATITIS: ICD-10-CM

## 2018-08-28 DIAGNOSIS — N89.8 VAGINAL ITCHING: Primary | ICD-10-CM

## 2018-08-28 PROCEDURE — G0463 HOSPITAL OUTPT CLINIC VISIT: HCPCS

## 2018-08-28 PROCEDURE — 99213 OFFICE O/P EST LOW 20 MIN: CPT | Performed by: FAMILY MEDICINE

## 2018-08-28 RX ORDER — TRIAMCINOLONE ACETONIDE 5 MG/G
CREAM TOPICAL
Qty: 30 G | Refills: 3 | Status: SHIPPED | OUTPATIENT
Start: 2018-08-28 | End: 2020-11-13

## 2018-08-28 NOTE — NURSING NOTE
Patient presents to clinic with vaginal itching x 4days  Katie Manzanares ....................  8/28/2018   2:20 PM

## 2018-08-28 NOTE — MR AVS SNAPSHOT
"              After Visit Summary   8/28/2018    Verena Wise    MRN: [id number]           Patient Information     Date Of Birth          1940        Visit Information        Provider Department      8/28/2018 2:30 PM Graciela Coleman MD [facility]        Today's Diagnoses     Vaginal itching    -  1    Dermatitis          Care Instructions      Contact Dermatitis  Contact dermatitis is a skin rash caused by something that touches the skin and makes it irritated and inflamed. Your skin may be red, swollen, dry, and may be cracked. Blisters may form and ooze. The rash will itch.  Contact dermatitis can form on the face and neck, backs of hands, forearms, genitals, and lower legs.  People can get contact dermatitis from lots of sources. These include:    Plants such as poison ivy, oak, or sumac    Chemicals in hair dyes and rinses, soaps, solvents, waxes, fingernail polish, and deodorants     Jewelry or watchbands made of nickel  Contact dermatitis is not passed from person to person.  Talk with your healthcare provider about what may have caused the rash. A type of allergy testing called \"patch testing\" may be used to discover what you are allergic to. You will need to avoid the source of your rash in the future to prevent it from coming back.  Treatment is done to relieve itching and prevent the rash from coming back. The rash should go away in a few days to a few weeks.  Home care  Your healthcare provider may prescribe medicine to relieve swelling and itching. Follow all instructions when using these medicines.  General care:    Avoid anything that heats up your skin, such as hot showers or baths, or direct sunlight. This can make itching worse.    Apply cold compresses to soothe your sores to help relieve your symptoms. Do this for 30 minutes 3 to 4 times a day. You can make a cold compress by soaking a cloth in cold water. Squeeze out excess water. You can add colloidal oatmeal " to the water to help reduce itching. For severe itching in a small area, apply an ice pack wrapped in a thin towel. Do this for 20 minutes 3 to 4 times a day.    You can also try wet dressings. One way to do this is to wear a wet piece of clothing under a dry one. Wear a damp shirt under a dry shirt if your upper body is affected. This can relieve itching and prevent you from scratching the affected area.    You can also help relieve large areas of itching by taking a lukewarm bath with colloidal oatmeal added to the water.    Use hydrocortisone cream for redness and irritation, unless another medicine was prescribed. You can also use benzocaine anesthetic cream or spray. Calamine lotion can also relieve mild symptoms.    Use oral diphenhydramine to help reduce itching. You can buy this antihistamine at drug and grocery stores. It can make you sleepy, so use lower doses during the daytime. Or you can use loratadine. This is an antihistamine that will not make you sleepy. Do not use diphenhydramine if you have glaucoma or have trouble urinating due to an enlarged prostate.    If a plant causes your rash, make sure to wash your skin and the clothes you were wearing when you came into contact with the plant. This is to wash away the plant oils that gave you the rash and prevent more or worse symptoms.    Stay away from the substance or object that causes your symptoms. If you can t avoid it, wear gloves or some other type of protection.  Follow-up care  Follow up with your healthcare provider, or as advised.  When to seek medical advice  Call your healthcare provider right away if any of these occur:    Spreading of the rash to other parts of your body    Severe swelling of your face, eyelids, mouth, throat or tongue    Trouble urinating due to swelling in the genital area    Fever of 100.4 F (38 C) or higher    Redness or swelling that gets worse    Pain that gets worse    Foul-smelling fluid leaking from the  skin    Yellow-brown crusts on the open blisters  Date Last Reviewed: 9/1/2016 2000-2017 [employer]. [address] [place] [address], [place] [address]. All rights reserved. This information is not intended as a substitute for professional medical care. Always follow your healthcare professional's instructions.                Follow-ups after your visit        Who to contact     If you have questions or need follow up information about today's clinic visit or your schedule please contact [facility] directly at [phone].  Normal or non-critical lab and imaging results will be communicated to you by [email or web address], letter or phone within 4 business days after the clinic has received the results. If you do not hear from us within 7 days, please contact the clinic through [employer] or phone. If you have a critical or abnormal lab result, we will notify you by phone as soon as possible.  Submit refill requests through [employer] or call your pharmacy and they will forward the refill request to us. Please allow 3 business days for your refill to be completed.          Additional Information About Your Visit        [email or web address] Information     [employer] gives you secure access to your electronic health record. If you see a primary care provider, you can also send messages to your care team and make appointments. If you have questions, please call your primary care clinic.  If you do not have a primary care provider, please call [phone] and they will assist you.        Care EveryWhere ID     This is your Care EveryWhere ID. This could be used by other organizations to access your [place] medical records  [id number]        Your Vitals Were     Pulse Temperature Breastfeeding? BMI (Body Mass Index)          80 97.6  F (36.4  C) No 24.57 kg/m2         Blood Pressure from Last 3 Encounters:   08/28/18 138/76   05/25/18 132/84   04/10/18 122/68    Weight from Last 3 Encounters:   08/28/18 152 lb 3.2 oz  (69 kg)   05/25/18 152 lb 14.4 oz (69.4 kg)   04/10/18 153 lb (69.4 kg)              We Performed the Following     Wet Prep, Genital          Today's Medication Changes          These changes are accurate as of 8/28/18  2:44 PM.  If you have any questions, ask your nurse or doctor.               Start taking these medicines.        Dose/Directions    triamcinolone 0.5 % cream   Commonly known as:  KENALOG   Used for:  Dermatitis   Started by:  Graciela Coleman MD        Apply sparingly to affected area two times daily.   Quantity:  30 g   Refills:  3            Where to get your medicines      These medications were sent to [facility] [address] [address]     Phone:  [phone]     triamcinolone 0.5 % cream                Primary Care Provider Office Phone # Fax #    Kaia Quinn, LILIA [phone]       [address] [facility] [address]        Equal Access to Services     [facility] AH: Hadii david ku hadasho Soomaali, waaxda luqadaha, qaybta kaalmada adeegyada, rachel vanegas hayeligio leon . So [facility] [phone].    ATENCIÓN: Si habla español, tiene a steinberg disposición servicios gratuitos de asistencia lingüística. Llame al [phone].    We comply with applicable federal civil rights laws and [place] laws. We do not discriminate on the basis of race, color, national origin, age, disability, sex, sexual orientation, or gender identity.            Thank you!     Thank you for choosing [facility]  for your care. Our goal is always to provide you with excellent care. Hearing back from our patients is one way we can continue to improve our services. Please take a few minutes to complete the written survey that you may receive in the mail after your visit with us. Thank you!             Your Updated Medication List - Protect others around you: Learn how to safely use,  store and throw away your medicines at www.disposemymeds.org.          This list is accurate as of 8/28/18  2:44 PM.  Always use your most recent med list.                   Brand Name Dispense Instructions for use Diagnosis    ascorbic acid 1000 MG Tabs    vitamin C    30 tablet    Take by mouth daily        aspirin 81 MG chewable tablet      Take 81 mg by mouth daily with food        calcium carbonate-vitamin D 600-400 MG-UNIT Chew     180 tablet    Take 1 chew tab by mouth 2 times daily        Flaxseed Oil Oil      As directed once daily.        folic acid 1 MG tablet    FOLVITE     Take 2 mg by mouth daily        Ginger Extract 250 MG Caps     120 capsule    Take 1 capsule by mouth daily        methotrexate (PF) 25 MG/ML Soln           predniSONE 1 MG tablet    DELTASONE     Take 1 mg by mouth daily with food        PROBIOTIC ACIDOPHILUS PO           RESTASIS 0.05 % ophthalmic emulsion   Generic drug:  cycloSPORINE      Place 1 drop into both eyes 2 times daily        SALMON OIL-1000 PO           triamcinolone 0.5 % cream    KENALOG    30 g    Apply sparingly to affected area two times daily.    Dermatitis       Turmeric Curcumin 500 MG Caps           VOLTAREN 1 % Gel topical gel   Generic drug:  diclofenac      Apply to joints as needed.

## 2018-08-28 NOTE — PATIENT INSTRUCTIONS
"  Contact Dermatitis  Contact dermatitis is a skin rash caused by something that touches the skin and makes it irritated and inflamed. Your skin may be red, swollen, dry, and may be cracked. Blisters may form and ooze. The rash will itch.  Contact dermatitis can form on the face and neck, backs of hands, forearms, genitals, and lower legs.  People can get contact dermatitis from lots of sources. These include:    Plants such as poison ivy, oak, or sumac    Chemicals in hair dyes and rinses, soaps, solvents, waxes, fingernail polish, and deodorants     Jewelry or watchbands made of nickel  Contact dermatitis is not passed from person to person.  Talk with your healthcare provider about what may have caused the rash. A type of allergy testing called \"patch testing\" may be used to discover what you are allergic to. You will need to avoid the source of your rash in the future to prevent it from coming back.  Treatment is done to relieve itching and prevent the rash from coming back. The rash should go away in a few days to a few weeks.  Home care  Your healthcare provider may prescribe medicine to relieve swelling and itching. Follow all instructions when using these medicines.  General care:    Avoid anything that heats up your skin, such as hot showers or baths, or direct sunlight. This can make itching worse.    Apply cold compresses to soothe your sores to help relieve your symptoms. Do this for 30 minutes 3 to 4 times a day. You can make a cold compress by soaking a cloth in cold water. Squeeze out excess water. You can add colloidal oatmeal to the water to help reduce itching. For severe itching in a small area, apply an ice pack wrapped in a thin towel. Do this for 20 minutes 3 to 4 times a day.    You can also try wet dressings. One way to do this is to wear a wet piece of clothing under a dry one. Wear a damp shirt under a dry shirt if your upper body is affected. This can relieve itching and prevent you from " scratching the affected area.    You can also help relieve large areas of itching by taking a lukewarm bath with colloidal oatmeal added to the water.    Use hydrocortisone cream for redness and irritation, unless another medicine was prescribed. You can also use benzocaine anesthetic cream or spray. Calamine lotion can also relieve mild symptoms.    Use oral diphenhydramine to help reduce itching. You can buy this antihistamine at drug and grocery stores. It can make you sleepy, so use lower doses during the daytime. Or you can use loratadine. This is an antihistamine that will not make you sleepy. Do not use diphenhydramine if you have glaucoma or have trouble urinating due to an enlarged prostate.    If a plant causes your rash, make sure to wash your skin and the clothes you were wearing when you came into contact with the plant. This is to wash away the plant oils that gave you the rash and prevent more or worse symptoms.    Stay away from the substance or object that causes your symptoms. If you can t avoid it, wear gloves or some other type of protection.  Follow-up care  Follow up with your healthcare provider, or as advised.  When to seek medical advice  Call your healthcare provider right away if any of these occur:    Spreading of the rash to other parts of your body    Severe swelling of your face, eyelids, mouth, throat or tongue    Trouble urinating due to swelling in the genital area    Fever of 100.4 F (38 C) or higher    Redness or swelling that gets worse    Pain that gets worse    Foul-smelling fluid leaking from the skin    Yellow-brown crusts on the open blisters  Date Last Reviewed: 9/1/2016 2000-2017 [employer]. [address] [place] [address]. All rights reserved. This information is not intended as a substitute for professional medical care. Always follow your healthcare professional's instructions.

## 2018-08-28 NOTE — PROGRESS NOTES
Nursing Notes:   Rich Jackson M., LPN  8/28/2018  2:20 PM  Unsigned  Patient presents to clinic with vaginal itching x 4days  Katie FLANNERYHawk Manzanares ....................  8/28/2018   2:20 PM      SUBJECTIVE:   78 year old female complains of vaginal itching externally for 3-4 days and tried some monistat cream.    Denies abnormal vaginal bleeding or significant pelvic pain or  fever. No UTI symptoms. Denies history of known exposure to STD. Denies dyspareunia.    No LMP recorded. Patient is postmenopausal.    OBJECTIVE:   /76  Pulse 80  Temp 97.6  F (36.4  C)  Wt 152 lb 3.2 oz (69 kg)  Breastfeeding? No  BMI 24.57 kg/m2    She appears well, afebrile.    Pelvic Exam: Labia minora, glands and clitoral chacon are markedly edematous and red.  She has a contact type dermatitis that extends to the left groin area.  Insertion of speculum reveals atrophic changes, no discharge and no involvement of the vagina. Wet prep not indicated.    ASSESSMENT:   1. Vaginal itching    2. Dermatitis          PLAN:   This is a contact dermatitis caused by the use of Vagisil product.  Stop Vagisil and Monistat.  Use topical steroid cream given only and no other topicals or scented products recommended.  Follow-up as needed.  Graciela Coleman MD  2:48 PM 8/28/2018   Portions of this dictation were created using the Dragon Nuance voice recognition system. Proofreading was completed but there may be errors in text.

## 2018-10-02 ENCOUNTER — OFFICE VISIT (OUTPATIENT)
Dept: INTERNAL MEDICINE | Facility: OTHER | Age: 78
End: 2018-10-02
Attending: NURSE PRACTITIONER
Payer: MEDICARE

## 2018-10-02 VITALS
BODY MASS INDEX: 24.69 KG/M2 | TEMPERATURE: 97.1 F | WEIGHT: 153 LBS | DIASTOLIC BLOOD PRESSURE: 82 MMHG | SYSTOLIC BLOOD PRESSURE: 122 MMHG

## 2018-10-02 DIAGNOSIS — M41.80 ROTOSCOLIOSIS: ICD-10-CM

## 2018-10-02 DIAGNOSIS — R10.9 LEFT FLANK PAIN: Primary | ICD-10-CM

## 2018-10-02 DIAGNOSIS — M06.9 RHEUMATOID ARTHRITIS INVOLVING MULTIPLE SITES, UNSPECIFIED RHEUMATOID FACTOR PRESENCE: ICD-10-CM

## 2018-10-02 LAB
ALBUMIN SERPL-MCNC: 4.5 G/DL (ref 3.5–5.7)
ALBUMIN UR-MCNC: NEGATIVE MG/DL
ALP SERPL-CCNC: 59 U/L (ref 34–104)
ALT SERPL W P-5'-P-CCNC: 16 U/L (ref 7–52)
ANION GAP SERPL CALCULATED.3IONS-SCNC: 10 MMOL/L (ref 3–14)
APPEARANCE UR: CLEAR
AST SERPL W P-5'-P-CCNC: 24 U/L (ref 13–39)
BILIRUB SERPL-MCNC: 0.4 MG/DL (ref 0.3–1)
BILIRUB UR QL STRIP: NEGATIVE
BUN SERPL-MCNC: 19 MG/DL (ref 7–25)
CALCIUM SERPL-MCNC: 9.8 MG/DL (ref 8.6–10.3)
CHLORIDE SERPL-SCNC: 101 MMOL/L (ref 98–107)
CO2 SERPL-SCNC: 26 MMOL/L (ref 21–31)
COLOR UR AUTO: YELLOW
CREAT SERPL-MCNC: 0.9 MG/DL (ref 0.6–1.2)
CRP SERPL-MCNC: 0.1 MG/L
ERYTHROCYTE [DISTWIDTH] IN BLOOD BY AUTOMATED COUNT: 13.6 % (ref 10–15)
ERYTHROCYTE [SEDIMENTATION RATE] IN BLOOD BY WESTERGREN METHOD: 11 MM/H (ref 1–15)
GFR SERPL CREATININE-BSD FRML MDRD: 61 ML/MIN/1.7M2
GLUCOSE SERPL-MCNC: 92 MG/DL (ref 70–105)
GLUCOSE UR STRIP-MCNC: NEGATIVE MG/DL
HCT VFR BLD AUTO: 39.7 % (ref 35–47)
HGB BLD-MCNC: 13 G/DL (ref 11.7–15.7)
HGB UR QL STRIP: NEGATIVE
KETONES UR STRIP-MCNC: NEGATIVE MG/DL
LEUKOCYTE ESTERASE UR QL STRIP: NEGATIVE
MCH RBC QN AUTO: 31.6 PG (ref 26.5–33)
MCHC RBC AUTO-ENTMCNC: 32.7 G/DL (ref 31.5–36.5)
MCV RBC AUTO: 97 FL (ref 78–100)
NITRATE UR QL: NEGATIVE
PH UR STRIP: 6.5 PH (ref 5–9)
PLATELET # BLD AUTO: 277 10E9/L (ref 150–450)
POTASSIUM SERPL-SCNC: 3.9 MMOL/L (ref 3.5–5.1)
PROT SERPL-MCNC: 7.5 G/DL (ref 6.4–8.9)
RBC # BLD AUTO: 4.11 10E12/L (ref 3.8–5.2)
SODIUM SERPL-SCNC: 137 MMOL/L (ref 134–144)
SOURCE: NORMAL
SP GR UR STRIP: 1.01 (ref 1–1.03)
UROBILINOGEN UR STRIP-ACNC: 0.2 EU/DL (ref 0.2–1)
WBC # BLD AUTO: 5.8 10E9/L (ref 4–11)

## 2018-10-02 PROCEDURE — 80053 COMPREHEN METABOLIC PANEL: CPT | Performed by: NURSE PRACTITIONER

## 2018-10-02 PROCEDURE — G0463 HOSPITAL OUTPT CLINIC VISIT: HCPCS

## 2018-10-02 PROCEDURE — 85652 RBC SED RATE AUTOMATED: CPT | Performed by: NURSE PRACTITIONER

## 2018-10-02 PROCEDURE — 85027 COMPLETE CBC AUTOMATED: CPT | Performed by: NURSE PRACTITIONER

## 2018-10-02 PROCEDURE — 86140 C-REACTIVE PROTEIN: CPT | Performed by: NURSE PRACTITIONER

## 2018-10-02 PROCEDURE — 36415 COLL VENOUS BLD VENIPUNCTURE: CPT | Performed by: NURSE PRACTITIONER

## 2018-10-02 PROCEDURE — 99214 OFFICE O/P EST MOD 30 MIN: CPT | Performed by: NURSE PRACTITIONER

## 2018-10-02 PROCEDURE — 81003 URINALYSIS AUTO W/O SCOPE: CPT | Performed by: NURSE PRACTITIONER

## 2018-10-02 ASSESSMENT — PAIN SCALES - GENERAL: PAINLEVEL: MODERATE PAIN (4)

## 2018-10-02 NOTE — MR AVS SNAPSHOT
After Visit Summary   10/2/2018    Verena Wise    MRN: [id number]           Patient Information     Date Of Birth          1940        Visit Information        Provider Department      10/2/2018 3:20 PM Kaia Quinn NP [facility]        Today's Diagnoses     Left flank pain    -  1    Rotoscoliosis        Rheumatoid arthritis involving multiple sites, unspecified rheumatoid factor presence (H)           Follow-ups after your visit        Who to contact     If you have questions or need follow up information about today's clinic visit or your schedule please contact [facility] directly at [phone].  Normal or non-critical lab and imaging results will be communicated to you by ProTiphart, letter or phone within 4 business days after the clinic has received the results. If you do not hear from us within 7 days, please contact the clinic through ProT[employer] or phone. If you have a critical or abnormal lab result, we will notify you by phone as soon as possible.  Submit refill requests through [employer] or call your pharmacy and they will forward the refill request to us. Please allow 3 business days for your refill to be completed.          Additional Information About Your Visit        MyChart Information     [employer] gives you secure access to your electronic health record. If you see a primary care provider, you can also send messages to your care team and make appointments. If you have questions, please call your primary care clinic.  If you do not have a primary care provider, please call [phone] and they will assist you.        Care EveryWhere ID     This is your Care EveryWhere ID. This could be used by other organizations to access your [place] medical records  [id number]        Your Vitals Were     Temperature Breastfeeding? BMI (Body Mass Index)             97.1  F (36.2  C) (Tympanic) No 24.69 kg/m2          Blood Pressure from  Last 3 Encounters:   10/02/18 122/82   08/28/18 138/76   05/25/18 132/84    Weight from Last 3 Encounters:   10/02/18 153 lb (69.4 kg)   08/28/18 152 lb 3.2 oz (69 kg)   05/25/18 152 lb 14.4 oz (69.4 kg)              We Performed the Following     *UA reflex to Microscopic     CBC with platelets     Comprehensive metabolic panel     CRP inflammation     Erythrocyte sedimentation rate auto        Primary Care Provider Office Phone # Fax #    Kaia WANG LILIA Quinn [phone] [address]  [facility] [address]        Equal Access to Services     [facility] : Hadii aad priscila Marie, waaxda luqadaha, qaybta kaalmada yudith, rachel leon . So [facility] [phone].    ATENCIÓN: Si habla español, tiene a steinberg disposición servicios gratuitos de asistencia lingüística. LlVeterans Health Administration [phone].    We comply with applicable federal civil rights laws and [place] laws. We do not discriminate on the basis of race, color, national origin, age, disability, sex, sexual orientation, or gender identity.            Thank you!     Thank you for choosing [facility]  for your care. Our goal is always to provide you with excellent care. Hearing back from our patients is one way we can continue to improve our services. Please take a few minutes to complete the written survey that you may receive in the mail after your visit with us. Thank you!             Your Updated Medication List - Protect others around you: Learn how to safely use, store and throw away your medicines at www.disposemymeds.org.          This list is accurate as of 10/2/18  4:13 PM.  Always use your most recent med list.                   Brand Name Dispense Instructions for use Diagnosis    ascorbic acid 1000 MG Tabs    vitamin C    30 tablet    Take by mouth daily        aspirin 81 MG chewable tablet      Take 81 mg by mouth daily with food        calcium carbonate-vitamin D  600-400 MG-UNIT Chew     180 tablet    Take 1 chew tab by mouth 2 times daily        Flaxseed Oil Oil      As directed once daily.        folic acid 1 MG tablet    FOLVITE     Take 2 mg by mouth daily        Ginger Extract 250 MG Caps     120 capsule    Take 1 capsule by mouth daily        methotrexate (PF) 25 MG/ML Soln           predniSONE 1 MG tablet    DELTASONE     Take 1 mg by mouth daily with food        PROBIOTIC ACIDOPHILUS PO           RESTASIS 0.05 % ophthalmic emulsion   Generic drug:  cycloSPORINE      Place 1 drop into both eyes 2 times daily        SALMON OIL-1000 PO           triamcinolone 0.5 % cream    KENALOG    30 g    Apply sparingly to affected area two times daily.    Dermatitis       Turmeric Curcumin 500 MG Caps           VOLTAREN 1 % Gel topical gel   Generic drug:  diclofenac      Apply to joints as needed.
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
## 2018-10-02 NOTE — NURSING NOTE
"Chief Complaint   Patient presents with     Back Pain     Lower left back pain     Initial /82 (BP Location: Right arm, Patient Position: Chair, Cuff Size: Adult Regular)  Temp 97.1  F (36.2  C) (Tympanic)  Wt 153 lb (69.4 kg)  Breastfeeding? No  BMI 24.69 kg/m2 Estimated body mass index is 24.69 kg/(m^2) as calculated from the following:    Height as of 5/25/18: 5' 6\" (1.676 m).    Weight as of this encounter: 153 lb (69.4 kg).  Medication Reconciliation: complete      Jazzmine Mora LPN on 10/2/2018 at 3:13 PM    "

## 2018-10-03 ENCOUNTER — TELEPHONE (OUTPATIENT)
Dept: INTERNAL MEDICINE | Facility: OTHER | Age: 78
End: 2018-10-03

## 2018-10-03 DIAGNOSIS — G89.29 CHRONIC MIDLINE LOW BACK PAIN WITHOUT SCIATICA: Primary | ICD-10-CM

## 2018-10-03 DIAGNOSIS — M54.50 CHRONIC MIDLINE LOW BACK PAIN WITHOUT SCIATICA: Primary | ICD-10-CM

## 2018-10-03 DIAGNOSIS — M06.9 RHEUMATOID ARTHRITIS INVOLVING MULTIPLE SITES, UNSPECIFIED RHEUMATOID FACTOR PRESENCE: ICD-10-CM

## 2018-10-03 ASSESSMENT — PATIENT HEALTH QUESTIONNAIRE - PHQ9: SUM OF ALL RESPONSES TO PHQ QUESTIONS 1-9: 0

## 2018-10-03 NOTE — TELEPHONE ENCOUNTER
Patient states needs referral for physical therapy. She would like to be seen for her back. Wondering if you can place this?   Jazzmine Mora LPN...................10/3/2018   2:04 PM

## 2018-10-30 ENCOUNTER — HOSPITAL ENCOUNTER (OUTPATIENT)
Dept: PHYSICAL THERAPY | Facility: OTHER | Age: 78
Setting detail: THERAPIES SERIES
End: 2018-10-30
Attending: NURSE PRACTITIONER
Payer: MEDICARE

## 2018-10-30 DIAGNOSIS — G89.29 CHRONIC MIDLINE LOW BACK PAIN WITHOUT SCIATICA: ICD-10-CM

## 2018-10-30 DIAGNOSIS — M54.50 CHRONIC MIDLINE LOW BACK PAIN WITHOUT SCIATICA: ICD-10-CM

## 2018-10-30 PROCEDURE — 97110 THERAPEUTIC EXERCISES: CPT | Mod: GP

## 2018-10-30 PROCEDURE — 97162 PT EVAL MOD COMPLEX 30 MIN: CPT | Mod: GP

## 2018-10-30 PROCEDURE — 40000185 ZZHC STATISTIC PT OUTPT VISIT

## 2018-10-30 PROCEDURE — G8979 MOBILITY GOAL STATUS: HCPCS | Mod: GP,CI

## 2018-10-30 PROCEDURE — 97140 MANUAL THERAPY 1/> REGIONS: CPT | Mod: GP

## 2018-10-30 PROCEDURE — G8978 MOBILITY CURRENT STATUS: HCPCS | Mod: GP,CJ

## 2018-10-31 NOTE — PROGRESS NOTES
10/30/18 1300   General Information   Type of Visit Initial OP Ortho PT Evaluation   Start of Care Date 10/30/18   Referring Physician Hillary Quinn CNP   Orders Evaluate and Treat   Date of Order 10/03/18   Insurance Type Medicare   Medical Diagnosis Low Back Pain   Body Part(s)   Body Part(s) Lumbar Spine/SI   Presentation and Etiology   Pertinent history of current problem (include personal factors and/or comorbidities that impact the POC) Pt reports that she thinks she was doing too much gardening beginning in July and has progressively gotten worse. She states that she continued to do her exercises , which helped for awhile until she cut down peonies.   Impairments A. Pain;D. Decreased ROM;E. Decreased flexibility;F. Decreased strength and endurance   Functional Limitations perform activities of daily living   Symptom Location L low back pain   How/Where did it occur While lifting;Other   Onset date of current episode/exacerbation (Bending and gardening)   Chronicity New   Pain rating (0-10 point scale) Best (/10);Worst (/10)   Best (/10) 2   Worst (/10) 7   Pain quality C. Aching   Frequency of pain/symptoms A. Constant   Pain/symptoms are: Worse in the morning   Pain/symptoms exacerbated by B. Walking;C. Lifting;D. Carrying;I. Bending;M. Other   Pain exacerbation comment (Standing)   Pain/symptoms eased by C. Rest;K. Other   Pain eased by comment (Lying down)   Progression of symptoms since onset: Improved   Current / Previous Interventions   Diagnostic Tests: Other   Other diagnostic tests (None)   Fall Risk Screen   Fall screen completed by PT   Have you fallen 2 or more times in the past year? No   Have you fallen and had an injury in the past year? No   Is patient a fall risk? No   Abuse Risk Screen   QUESTION TO PATIENT:  Has a member of your family or a partner(now or in the past) intimidated, hurt, manipulated, or controlled you in any way? no   QUESTION TO PATIENT: Do you feel safe going back to  the place where you are living? yes   OBSERVATION: Is there reason to believe there has been maltreatment of a vulnerable adult (ie. Physical/Sexual/Emotional abuse, self neglect, lack of adequate food, shelter, medical care, or financial exploitation)? no   Lumbar Spine/SI Objective Findings   Observation General Listening: L anterior   Posture Lacking full R knee extension, hips L and shoulders R creating a mild scoliosis   Flexion ROM 90 degrees   Extension ROM 16 degrees   Left Side Bending ROM Decreased from below with decrease R hip drop   Hip Screen + L FADER   Segmental Mobility FRS-L L1, FRS-R L2, FRS-L L3   Palpation Organ specific myofascial assessment: + scar tissue gall bladder, ascending, and descending colon   Planned Therapy Interventions   Planned Therapy Interventions joint mobilization;manual therapy;neuromuscular re-education;ROM;strengthening;stretching   Clinical Impression   Criteria for Skilled Therapeutic Interventions Met yes, treatment indicated   PT Diagnosis Lumbar segmental dysfunctions, myofascial and dural tightness,    Functional limitations due to impairments Lumbar segmental dysfunctions, myofascial and dural tightness,    Clinical Presentation Evolving/Changing   Clinical Decision Making (Complexity) Moderate complexity   Therapy Frequency 2 times/Week   Predicted Duration of Therapy Intervention (days/wks) 12 weeks   Risk & Benefits of therapy have been explained Yes   Patient, Family & other staff in agreement with plan of care Yes   Education Assessment   Preferred Learning Style Listening;Reading;Demonstration;Pictures/video   Barriers to Learning No barriers   ORTHO GOALS   PT Ortho Eval Goals 1;2;3;4   Ortho Goal 1   Goal Identifier Lifting   Goal Description Pt will tolerate lifting mediaum weight without increasing her pain > 2-3/10   Target Date 01/28/19   Ortho Goal 2   Goal Identifier Walking   Goal Description Pt will tolerate walking 2 miles without increasing pain >  2-3/10   Target Date 01/28/19   Ortho Goal 3   Goal Identifier Sitting   Goal Description Pt will tolerate sitting 60 minutes without increasing pain > 2-3/10   Target Date 01/28/19   Ortho Goal 4   Goal Identifier Standing   Goal Description Pt will tolerate standing 45-60 minutes without increasing pain > 2-3/10   Target Date 01/28/19   Total Evaluation Time   Total Evaluation Time 30   Therapy Certification   Certification date from 10/30/18   Certification date to 01/28/19   Medical Diagnosis Low Back Pain

## 2018-10-31 NOTE — PROGRESS NOTES
[facility]          OUTPATIENT PHYSICAL THERAPY ORTHOPEDIC EVALUATION  PLAN OF TREATMENT FOR OUTPATIENT REHABILITATION  (COMPLETE FOR INITIAL CLAIMS ONLY)  Patient's Last Name, First Name, M.I.  YOB: 1940  Verena Wise    Provider s Name:  [facility]   Medical Record No.  [id number]   Start of Care Date:  10/30/18   Onset Date:   (Bending and gardening)   Type:     _X__PT   ___OT   ___SLP Medical Diagnosis:  Low Back Pain     PT Diagnosis:  Lumbar segmental dysfunctions, myofascial and dural tightness,    Visits from SOC:  1      _________________________________________________________________________________  Plan of Treatment/Functional Goals:  joint mobilization, manual therapy, neuromuscular re-education, ROM, strengthening, stretching           Goals  Goal Identifier: Lifting  Goal Description: Pt will tolerate lifting mediaum weight without increasing her pain > 2-3/10  Target Date: 01/28/19    Goal Identifier: Walking  Goal Description: Pt will tolerate walking 2 miles without increasing pain > 2-3/10  Target Date: 01/28/19    Goal Identifier: Sitting  Goal Description: Pt will tolerate sitting 60 minutes without increasing pain > 2-3/10  Target Date: 01/28/19    Goal Identifier: Standing  Goal Description: Pt will tolerate standing 45-60 minutes without increasing pain > 2-3/10  Target Date: 01/28/19                                                Therapy Frequency:  2 times/Week  Predicted Duration of Therapy Intervention:  12 weeks    Betina Pressley, PT                 I CERTIFY THE NEED FOR THESE SERVICES FURNISHED UNDER        THIS PLAN OF TREATMENT AND WHILE UNDER MY CARE     (Physician co-signature of this document indicates review and certification of the therapy plan).                       Certification Date From:  10/30/18   Certification Date To:  01/28/19    Referring Provider:  Hillary Quinn CNP    Initial Assessment        See  Epic Evaluation Start of Care Date: 10/30/18

## 2018-11-02 ENCOUNTER — HOSPITAL ENCOUNTER (OUTPATIENT)
Dept: PHYSICAL THERAPY | Facility: OTHER | Age: 78
Setting detail: THERAPIES SERIES
End: 2018-11-02
Attending: NURSE PRACTITIONER
Payer: MEDICARE

## 2018-11-02 PROCEDURE — 97140 MANUAL THERAPY 1/> REGIONS: CPT | Mod: GP

## 2018-11-02 PROCEDURE — 40000185 ZZHC STATISTIC PT OUTPT VISIT

## 2018-11-06 ENCOUNTER — HOSPITAL ENCOUNTER (OUTPATIENT)
Dept: PHYSICAL THERAPY | Facility: OTHER | Age: 78
Setting detail: THERAPIES SERIES
End: 2018-11-06
Attending: NURSE PRACTITIONER
Payer: MEDICARE

## 2018-11-06 PROCEDURE — 40000185 ZZHC STATISTIC PT OUTPT VISIT

## 2018-11-06 PROCEDURE — 97140 MANUAL THERAPY 1/> REGIONS: CPT | Mod: GP

## 2018-11-06 PROCEDURE — 97110 THERAPEUTIC EXERCISES: CPT | Mod: GP

## 2018-11-13 ENCOUNTER — HOSPITAL ENCOUNTER (OUTPATIENT)
Dept: PHYSICAL THERAPY | Facility: OTHER | Age: 78
Setting detail: THERAPIES SERIES
End: 2018-11-13
Attending: NURSE PRACTITIONER
Payer: MEDICARE

## 2018-11-13 PROCEDURE — 97140 MANUAL THERAPY 1/> REGIONS: CPT | Mod: GP

## 2018-11-13 PROCEDURE — 40000840 ZZHC STATISTIC PT VESTIBULAR VISIT

## 2018-11-16 ENCOUNTER — HOSPITAL ENCOUNTER (OUTPATIENT)
Dept: PHYSICAL THERAPY | Facility: OTHER | Age: 78
Setting detail: THERAPIES SERIES
End: 2018-11-16
Attending: NURSE PRACTITIONER
Payer: MEDICARE

## 2018-11-16 PROCEDURE — 97140 MANUAL THERAPY 1/> REGIONS: CPT | Mod: GP

## 2018-11-16 PROCEDURE — 40000185 ZZHC STATISTIC PT OUTPT VISIT

## 2018-11-16 PROCEDURE — 97110 THERAPEUTIC EXERCISES: CPT | Mod: GP

## 2018-11-20 ENCOUNTER — HOSPITAL ENCOUNTER (OUTPATIENT)
Dept: PHYSICAL THERAPY | Facility: OTHER | Age: 78
Setting detail: THERAPIES SERIES
End: 2018-11-20
Attending: NURSE PRACTITIONER
Payer: MEDICARE

## 2018-11-20 PROCEDURE — 40000185 ZZHC STATISTIC PT OUTPT VISIT

## 2018-11-20 PROCEDURE — G8980 MOBILITY D/C STATUS: HCPCS | Mod: GP,CJ

## 2018-11-20 PROCEDURE — 97140 MANUAL THERAPY 1/> REGIONS: CPT | Mod: GP

## 2018-11-20 PROCEDURE — G8979 MOBILITY GOAL STATUS: HCPCS | Mod: GP,CI

## 2018-11-20 NOTE — PROGRESS NOTES
Outpatient Physical Therapy Discharge Note     Patient: Verena Wise  : 1940    Beginning/End Dates of Reporting Period:  10-30-18 to 2018    Referring Provider: SAMANTHA Álvarez Diagnosis: Thoracic,lumbo-sacral dysfunction, dural and myofascial tightness     Client Self Report: Pt states that she is doing better with decrease pain. She did feel stiff after sitting in the car driving 45 minutes. Pt states that she is able to bend easier. She states that she is walking, sitting, and standing for longer periods of time.     Objective Measurements:  Objective Measure: General Listening/ Posture  Details: L anterior; Posture: improved R knee extension in standing  Objective Measure: Lumbar ROM  Details: Flexion: 96 , Extrension: 26 degrees  Objective Measure: Local Listening at Abdomen: Specific Organ Fascial Assessment  Details: +  descendiong colon and  Sigmoid colon fascial tightness  Objective Measure: R knee assessment  Details: Pt is lacking 10 degrees of extension, decrease tibial ER, decrease femur posterior glides, + diastisis  Objective Measure: Palpation:   Details: + L QL tenderness  Objective Measure: Mobility  Details:  FRS-R L1, FRS-B T12      Outcome Measures (most recent score):  Oswestry: Improved from 34% to 30%    Goals:  Goal Identifier Lifting   Goal Description Pt will tolerate lifting medium weight without increasing her pain > 2-3/10   Target Date 19   Date Met   (Progressing toward goal)   Progress:     Goal Identifier Walking   Goal Description Pt will tolerate walking 2 miles without increasing pain > 2-3/10   Target Date 19   Date Met  18   Progress:     Goal Identifier Sitting   Goal Description Pt will tolerate sitting 60 minutes without increasing pain > 2-3/10   Target Date 19   Date Met   (Pt able to sit for 30 minutes)   Progress:     Goal Identifier Standing   Goal Description Pt will tolerate standing 45-60 minutes without  increasing pain > 2-3/10   Target Date 01/28/19   Date Met   (Pt tolerates 30 minutes of standing)   Progress:     Goal Identifier     Goal Description     Target Date     Date Met      Progress:     Goal Identifier     Goal Description     Target Date     Date Met      Progress:     Goal Identifier     Goal Description     Target Date     Date Met      Progress:     Goal Identifier     Goal Description     Target Date     Date Met      Progress:     Progress Toward Goals:   Progress this reporting period: Pt is progressing toward goals. It is believed she can achieve long term goals with HEP.          Plan:  Discharge from therapy.    Discharge: Yes    Reason for Discharge: Pt has completed final PT appointment and chooses to continue with her exercises at home.      Discharge Plan: Patient to continue home program.

## 2018-11-20 NOTE — ADDENDUM NOTE
Encounter addended by: Betina Pressley PT on: 11/20/2018 10:02 AM<BR>     Actions taken: Flowsheet accepted

## 2018-11-26 DIAGNOSIS — Z79.899 HIGH RISK MEDICATION USE: ICD-10-CM

## 2018-11-26 DIAGNOSIS — M05.79 RHEUMATOID ARTHRITIS OF MULTIPLE SITES WITHOUT ORGAN OR SYSTEM INVOLVEMENT WITH POSITIVE RHEUMATOID FACTOR (H): ICD-10-CM

## 2018-11-26 LAB
ALBUMIN SERPL-MCNC: 4.3 G/DL (ref 3.5–5.7)
ALT SERPL W P-5'-P-CCNC: 13 U/L (ref 7–52)
BASOPHILS # BLD AUTO: 0.1 10E9/L (ref 0–0.2)
BASOPHILS NFR BLD AUTO: 1.6 %
CREAT SERPL-MCNC: 0.98 MG/DL (ref 0.6–1.2)
CRP SERPL-MCNC: 0.2 MG/L
DIFFERENTIAL METHOD BLD: NORMAL
EOSINOPHIL # BLD AUTO: 0.2 10E9/L (ref 0–0.7)
EOSINOPHIL NFR BLD AUTO: 3.1 %
ERYTHROCYTE [DISTWIDTH] IN BLOOD BY AUTOMATED COUNT: 13.6 % (ref 10–15)
ERYTHROCYTE [SEDIMENTATION RATE] IN BLOOD BY WESTERGREN METHOD: 21 MM/H (ref 1–15)
GFR SERPL CREATININE-BSD FRML MDRD: 55 ML/MIN/1.7M2
HCT VFR BLD AUTO: 41.2 % (ref 35–47)
HGB BLD-MCNC: 13.5 G/DL (ref 11.7–15.7)
IMM GRANULOCYTES # BLD: 0 10E9/L (ref 0–0.4)
IMM GRANULOCYTES NFR BLD: 0.2 %
LYMPHOCYTES # BLD AUTO: 0.9 10E9/L (ref 0.8–5.3)
LYMPHOCYTES NFR BLD AUTO: 14.7 %
MCH RBC QN AUTO: 31.8 PG (ref 26.5–33)
MCHC RBC AUTO-ENTMCNC: 32.8 G/DL (ref 31.5–36.5)
MCV RBC AUTO: 97 FL (ref 78–100)
MONOCYTES # BLD AUTO: 0.7 10E9/L (ref 0–1.3)
MONOCYTES NFR BLD AUTO: 10.6 %
NEUTROPHILS # BLD AUTO: 4.3 10E9/L (ref 1.6–8.3)
NEUTROPHILS NFR BLD AUTO: 69.8 %
PLATELET # BLD AUTO: 305 10E9/L (ref 150–450)
RBC # BLD AUTO: 4.25 10E12/L (ref 3.8–5.2)
WBC # BLD AUTO: 6.1 10E9/L (ref 4–11)

## 2018-11-26 PROCEDURE — 86140 C-REACTIVE PROTEIN: CPT | Performed by: NURSE PRACTITIONER

## 2018-11-26 PROCEDURE — 36415 COLL VENOUS BLD VENIPUNCTURE: CPT | Performed by: NURSE PRACTITIONER

## 2018-11-26 PROCEDURE — 82565 ASSAY OF CREATININE: CPT | Performed by: NURSE PRACTITIONER

## 2018-11-26 PROCEDURE — 84460 ALANINE AMINO (ALT) (SGPT): CPT | Performed by: NURSE PRACTITIONER

## 2018-11-26 PROCEDURE — 82040 ASSAY OF SERUM ALBUMIN: CPT | Performed by: NURSE PRACTITIONER

## 2018-11-26 PROCEDURE — 85025 COMPLETE CBC W/AUTO DIFF WBC: CPT | Performed by: NURSE PRACTITIONER

## 2018-11-26 PROCEDURE — 85652 RBC SED RATE AUTOMATED: CPT | Performed by: NURSE PRACTITIONER

## 2019-03-01 ENCOUNTER — OFFICE VISIT (OUTPATIENT)
Dept: INTERNAL MEDICINE | Facility: OTHER | Age: 79
End: 2019-03-01
Attending: NURSE PRACTITIONER
Payer: MEDICARE

## 2019-03-01 VITALS
DIASTOLIC BLOOD PRESSURE: 70 MMHG | HEART RATE: 68 BPM | BODY MASS INDEX: 25.1 KG/M2 | WEIGHT: 155.5 LBS | OXYGEN SATURATION: 96 % | SYSTOLIC BLOOD PRESSURE: 136 MMHG | TEMPERATURE: 96.9 F | RESPIRATION RATE: 18 BRPM

## 2019-03-01 DIAGNOSIS — Z79.899 HIGH RISK MEDICATION USE: ICD-10-CM

## 2019-03-01 DIAGNOSIS — M05.79 RHEUMATOID ARTHRITIS OF MULTIPLE SITES WITHOUT ORGAN OR SYSTEM INVOLVEMENT WITH POSITIVE RHEUMATOID FACTOR (H): ICD-10-CM

## 2019-03-01 DIAGNOSIS — R21 FACIAL RASH: Primary | ICD-10-CM

## 2019-03-01 DIAGNOSIS — L85.3 DRY SKIN: ICD-10-CM

## 2019-03-01 DIAGNOSIS — L57.0 AK (ACTINIC KERATOSIS): ICD-10-CM

## 2019-03-01 LAB
ALBUMIN SERPL-MCNC: 4.4 G/DL (ref 3.5–5.7)
ALT SERPL W P-5'-P-CCNC: 16 U/L (ref 7–52)
BASOPHILS # BLD AUTO: 0.1 10E9/L (ref 0–0.2)
BASOPHILS NFR BLD AUTO: 1.7 %
CREAT SERPL-MCNC: 1.08 MG/DL (ref 0.6–1.2)
CRP SERPL-MCNC: 0.1 MG/L
DIFFERENTIAL METHOD BLD: NORMAL
EOSINOPHIL # BLD AUTO: 0.1 10E9/L (ref 0–0.7)
EOSINOPHIL NFR BLD AUTO: 2.5 %
ERYTHROCYTE [DISTWIDTH] IN BLOOD BY AUTOMATED COUNT: 14.2 % (ref 10–15)
ERYTHROCYTE [SEDIMENTATION RATE] IN BLOOD BY WESTERGREN METHOD: 7 MM/H (ref 1–15)
GFR SERPL CREATININE-BSD FRML MDRD: 49 ML/MIN/{1.73_M2}
HCT VFR BLD AUTO: 41.7 % (ref 35–47)
HGB BLD-MCNC: 13.5 G/DL (ref 11.7–15.7)
IMM GRANULOCYTES # BLD: 0 10E9/L (ref 0–0.4)
IMM GRANULOCYTES NFR BLD: 0.4 %
LYMPHOCYTES # BLD AUTO: 1 10E9/L (ref 0.8–5.3)
LYMPHOCYTES NFR BLD AUTO: 19.5 %
MCH RBC QN AUTO: 31.6 PG (ref 26.5–33)
MCHC RBC AUTO-ENTMCNC: 32.4 G/DL (ref 31.5–36.5)
MCV RBC AUTO: 98 FL (ref 78–100)
MONOCYTES # BLD AUTO: 0.6 10E9/L (ref 0–1.3)
MONOCYTES NFR BLD AUTO: 11.6 %
NEUTROPHILS # BLD AUTO: 3.4 10E9/L (ref 1.6–8.3)
NEUTROPHILS NFR BLD AUTO: 64.3 %
PLATELET # BLD AUTO: 256 10E9/L (ref 150–450)
RBC # BLD AUTO: 4.27 10E12/L (ref 3.8–5.2)
WBC # BLD AUTO: 5.3 10E9/L (ref 4–11)

## 2019-03-01 PROCEDURE — G0463 HOSPITAL OUTPT CLINIC VISIT: HCPCS

## 2019-03-01 PROCEDURE — 99213 OFFICE O/P EST LOW 20 MIN: CPT | Performed by: NURSE PRACTITIONER

## 2019-03-01 PROCEDURE — 36415 COLL VENOUS BLD VENIPUNCTURE: CPT | Performed by: NURSE PRACTITIONER

## 2019-03-01 PROCEDURE — 84460 ALANINE AMINO (ALT) (SGPT): CPT | Performed by: NURSE PRACTITIONER

## 2019-03-01 PROCEDURE — 86140 C-REACTIVE PROTEIN: CPT | Performed by: NURSE PRACTITIONER

## 2019-03-01 PROCEDURE — 82040 ASSAY OF SERUM ALBUMIN: CPT | Performed by: NURSE PRACTITIONER

## 2019-03-01 PROCEDURE — 82565 ASSAY OF CREATININE: CPT | Performed by: NURSE PRACTITIONER

## 2019-03-01 PROCEDURE — 85025 COMPLETE CBC W/AUTO DIFF WBC: CPT | Performed by: NURSE PRACTITIONER

## 2019-03-01 PROCEDURE — 85652 RBC SED RATE AUTOMATED: CPT | Performed by: NURSE PRACTITIONER

## 2019-03-01 ASSESSMENT — PAIN SCALES - GENERAL: PAINLEVEL: NO PAIN (0)
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
## 2019-03-01 NOTE — NURSING NOTE
Chief Complaint   Patient presents with     Consult     spots on skin      Has some spots on her back, shoulder, and face that she says bother her. States they are sometimes dry, itchy, and inflamed.     Medication Reconciliation: complete    Rhonda Lee LPN

## 2019-04-18 ENCOUNTER — OFFICE VISIT (OUTPATIENT)
Dept: FAMILY MEDICINE | Facility: OTHER | Age: 79
End: 2019-04-18
Attending: NURSE PRACTITIONER
Payer: COMMERCIAL

## 2019-04-18 ENCOUNTER — TELEPHONE (OUTPATIENT)
Dept: FAMILY MEDICINE | Facility: OTHER | Age: 79
End: 2019-04-18

## 2019-04-18 VITALS
TEMPERATURE: 98 F | HEIGHT: 66 IN | HEART RATE: 68 BPM | RESPIRATION RATE: 18 BRPM | BODY MASS INDEX: 24.85 KG/M2 | DIASTOLIC BLOOD PRESSURE: 70 MMHG | WEIGHT: 154.6 LBS | SYSTOLIC BLOOD PRESSURE: 110 MMHG

## 2019-04-18 DIAGNOSIS — J01.90 ACUTE SINUSITIS WITH SYMPTOMS > 10 DAYS: Primary | ICD-10-CM

## 2019-04-18 DIAGNOSIS — M75.52 BURSITIS OF LEFT SHOULDER: ICD-10-CM

## 2019-04-18 PROCEDURE — G0463 HOSPITAL OUTPT CLINIC VISIT: HCPCS

## 2019-04-18 PROCEDURE — 99214 OFFICE O/P EST MOD 30 MIN: CPT | Performed by: NURSE PRACTITIONER

## 2019-04-18 RX ORDER — AZITHROMYCIN 250 MG/1
TABLET, FILM COATED ORAL
Qty: 6 TABLET | Refills: 0 | Status: SHIPPED | OUTPATIENT
Start: 2019-04-18 | End: 2019-05-30

## 2019-04-18 ASSESSMENT — ANXIETY QUESTIONNAIRES
5. BEING SO RESTLESS THAT IT IS HARD TO SIT STILL: NOT AT ALL
6. BECOMING EASILY ANNOYED OR IRRITABLE: NOT AT ALL
GAD7 TOTAL SCORE: 0
4. TROUBLE RELAXING: NOT AT ALL
1. FEELING NERVOUS, ANXIOUS, OR ON EDGE: NOT AT ALL
2. NOT BEING ABLE TO STOP OR CONTROL WORRYING: NOT AT ALL
3. WORRYING TOO MUCH ABOUT DIFFERENT THINGS: NOT AT ALL
IF YOU CHECKED OFF ANY PROBLEMS ON THIS QUESTIONNAIRE, HOW DIFFICULT HAVE THESE PROBLEMS MADE IT FOR YOU TO DO YOUR WORK, TAKE CARE OF THINGS AT HOME, OR GET ALONG WITH OTHER PEOPLE: NOT DIFFICULT AT ALL
7. FEELING AFRAID AS IF SOMETHING AWFUL MIGHT HAPPEN: NOT AT ALL

## 2019-04-18 ASSESSMENT — MIFFLIN-ST. JEOR: SCORE: 1193.01

## 2019-04-18 ASSESSMENT — PATIENT HEALTH QUESTIONNAIRE - PHQ9: SUM OF ALL RESPONSES TO PHQ QUESTIONS 1-9: 0

## 2019-04-18 ASSESSMENT — PAIN SCALES - GENERAL: PAINLEVEL: EXTREME PAIN (8)

## 2019-04-18 NOTE — PATIENT INSTRUCTIONS
ASSESSMENT/PLAN:     1. Acute sinusitis with symptoms > 10 days  Acute, symptomatic. Symptoms consistent with sinus infection and given use of methotrexate for management of rheumatoid arthritis will treat for sinusitis.   - Augmentin as directed.    - Take entire course of antibiotic even if you start to feel better.  - Antibiotics can cause stomach upset including nausea and diarrhea. Read your bottle or ask the pharmacist if antibiotic can be taken with food to help prevent nausea. If you have symptoms of diarrhea you can take an over-the-counter probiotic and/or increase foods with probiotics such as yogurt, Shaji, sauerkraut.    2. Bursitis of left shoulder  Acute, symptomatic.   - Continue with ice, heat, rest, Tylenol, ibuprofen, topical anesthetic such as bengay, biofreeze, lidocaine.  - If no improvement or worsening in 1-2 weeks follow-up.          FUTURE APPOINTMENTS:       - Follow-up visit: No improvement or worsening symptoms.     Cece De La Cruz, CNP  [facility] AND [facility]

## 2019-04-18 NOTE — NURSING NOTE
Patient presents to the clinic for left shoulder , neck and side of her head pain. The shoulder started last night and the pain in the neck and head started 2 weeks ago.  Medication Reconciliation Completed.    Sylvester Lawrence LPN  4/18/2019 12:39 PM

## 2019-04-18 NOTE — PROGRESS NOTES
"  SUBJECTIVE:   Verena Wise is a 79 year old female who presents to clinic today for a couple of health issues:    First, Head, neck, jaw- aching. Worse when red rash on cheeks is presents  Has tried avoiding certain foods, has tried not using make-up and facial cleansers. This has been going on for about 1 month. Denies sinus symptoms, congestion, rhinorrhea, coughing, sneezing. Possibly feels a little feverish/chilled at times. Uncertain what could be going on.      Next, is her Left shoulder- pain started last night after going to bed and progressively got worse while in bed. Today it is \"uncomfortable but as long as I don't move it, it is 2/10\" Has been using ice, ibuprofen which she feels is helpful.  History of rheumatoid arthritis which has been well-controlled with current regimen of methotrexate and prednisone 1 mg daily.          Additional history: as documented    Reviewed  and updated as needed this visit by clinical staff  Tobacco  Allergies  Meds  Problems  Med Hx  Surg Hx  Fam Hx         Reviewed and updated as needed this visit by Provider         Patient Active Problem List   Diagnosis     Allergic rhinitis     Arthritis, rheumatoid (H)     Lumbago     Borderline high cholesterol     Solitary cyst of breast     Chronic steroid use     Malaise and fatigue     Pain in joint, pelvic region and thigh     Lactose intolerance     Disorder of bone and cartilage     Other affections of shoulder region, not elsewhere classified     Sjogren's syndrome (H)     Spondylosis, cervical     Phlebitis and thrombophlebitis of superficial vessels of lower extremities     Varices of other sites     Past Surgical History:   Procedure Laterality Date     BIOPSY BREAST      1996,Left breast biopsy.     BIOPSY BREAST      4/30,Stereotactic right breast biopsy on 4/30 for mildly proliferative benign breast disease     CHOLECYSTECTOMY      1995     COLONOSCOPY      1995,Colonoscopy negative     COLONOSCOPY      "  05,next colonoscopy due in .     OTHER SURGICAL HISTORY      ,,HERNIA REPAIR,Umbilical hernia repair       Social History     Tobacco Use     Smoking status: Never Smoker     Smokeless tobacco: Never Used   Substance Use Topics     Alcohol use: No     Alcohol/week: 0.0 oz     Family History   Problem Relation Age of Onset     Breast Cancer Sister          62 of breast cancer     Hypertension Father         Hypertension     Heart Disease Father         Heart Disease     Other - See Comments Father 78        Stroke,Massive heart attack  age 78     Hypertension Mother         Hypertension     Heart Disease Mother 85        Heart Disease,Mother  at age 85 of hypertension and heart disease, was born with a cataract/glaucoma.     Other - See Comments Maternal Grandfather         Stroke, stroke and heart attack.     Heart Disease Maternal Grandfather         Heart Disease     Colon Cancer Maternal Uncle         Cancer-colon,  in 80s with colon cancer     Other - See Comments Maternal Uncle         Hodgkin's     Heart Disease Brother         Heart Disease     Family History Negative Child         Good Health     Family History Negative Child         Good Health     Family History Negative Child         Good Health     Family History Negative Other         Good Health,Spouse.     Breast Cancer Sister 43        Cancer-breast,  age 43     Breast Cancer Maternal Aunt         Cancer-breast         Current Outpatient Medications   Medication Sig Dispense Refill     ascorbic acid (VITAMIN C) 1000 MG TABS Take by mouth daily 30 tablet      aspirin 81 MG chewable tablet Take 81 mg by mouth daily with food       CALCIUM CARBONATE-VIT D-MIN PO Take 2 capsules by mouth       calcium carbonate-vitamin D 600-400 MG-UNIT CHEW Take 1 chew tab by mouth 2 times daily 180 tablet 3     cycloSPORINE (RESTASIS) 0.05 % ophthalmic emulsion Place 1 drop into both eyes 2 times daily       Flaxseed Oil OIL  "As directed once daily.         folic acid (FOLVITE) 1 MG tablet Take 2 mg by mouth daily       Ginger, Zingiber officinalis, (GINGER EXTRACT) 250 MG CAPS Take 1 capsule by mouth daily 120 capsule      Lactobacillus (PROBIOTIC ACIDOPHILUS PO)        Methotrexate, Anti-Rheumatic, (METHOTREXATE, PF,) 25 MG/ML SOLN        Omega-3 Fatty Acids (SALMON OIL-1000 PO)        predniSONE (DELTASONE) 1 MG tablet Take 1 mg by mouth daily with food       triamcinolone (KENALOG) 0.5 % cream Apply sparingly to affected area two times daily. 30 g 3     Turmeric Curcumin 500 MG CAPS        VOLTAREN 1 % GEL topical gel Apply to joints as needed.       Allergies   Allergen Reactions     Codeine Nausea     Other reaction(s): Dizziness  Other reaction(s): Dizziness     Recent Labs   Lab Test 03/01/19  0839 11/26/18  1045 10/02/18  1558  10/24/17  1004  10/13/16  1110  04/03/13  0922   LDL  --   --   --   --  146*  --   --   --  162*   HDL  --   --   --   --  86  --   --   --  79   TRIG  --   --   --   --  68  --   --   --  70   ALT 16 13 16   < >  --   --   --   --   --    CR 1.08 0.98 0.90   < >  --    < > 1.00   < > 0.87   GFRESTIMATED 49* 55* 61   < >  --   --   --   --   --    GFRESTBLACK 59* 66 73   < >  --    < > >60   < > >60   POTASSIUM  --   --  3.9  --   --   --  4.1   < > 4.3    < > = values in this interval not displayed.      BP Readings from Last 3 Encounters:   04/18/19 110/70   03/01/19 136/70   10/02/18 122/82    Wt Readings from Last 3 Encounters:   04/18/19 70.1 kg (154 lb 9.6 oz)   03/01/19 70.5 kg (155 lb 8 oz)   10/02/18 69.4 kg (153 lb)                    ROS:  Constitutional, HEENT, cardiovascular, pulmonary, gi and gu systems are negative, except as otherwise noted.    OBJECTIVE:     /70 (BP Location: Right arm, Patient Position: Sitting, Cuff Size: Adult Large)   Pulse 68   Temp 98  F (36.7  C)   Resp 18   Ht 1.676 m (5' 6\")   Wt 70.1 kg (154 lb 9.6 oz)   Breastfeeding? No   BMI 24.95 kg/m    Body " mass index is 24.95 kg/m .     GENERAL: healthy, alert. Pleasant  EYES: Eyes grossly normal to inspection, PERRLA and conjunctivae and sclerae normal  HENT: normocephalic, atraumatic, ear canals and TM's normal, nose and mouth without ulcers or lesions, bilateral frontal and maxillary sinus tenderness with left greater than the right  NECK: no adenopathy, no asymmetry, masses, or scars  RESP: lungs clear to auscultation - no rales, rhonchi or wheezes  CV: regular rate and rhythm, normal S1 S2, no S3 or S4, no murmur, click or rub  MS: no gross musculoskeletal defects noted, no edema, tenderness to anterior left shoulder- no edema or erythema or warmth  SKIN: no suspicious lesions or rashes  NEURO: Normal strength and tone, mentation intact and speech normal  PSYCH: mentation appears normal, affect normal    Diagnostic Test Results:  none     ASSESSMENT/PLAN:     1. Acute sinusitis with symptoms > 10 days  Acute, symptomatic. Symptoms and exam consistent with sinus infection and given use of methotrexate for management of rheumatoid arthritis will treat for sinusitis. Discussed possibility of cheeks being flushed and correlating to increased pain due to low grade fever from infection. She should follow-up if no improvement after treatment.  - Augmentin as directed.    - Take entire course of antibiotic even if you start to feel better.  - Antibiotics can cause stomach upset including nausea and diarrhea. Read your bottle or ask the pharmacist if antibiotic can be taken with food to help prevent nausea. If you have symptoms of diarrhea you can take an over-the-counter probiotic and/or increase foods with probiotics such as yogurt, Shaji, sauerkraut.    2. Bursitis of left shoulder  Acute, symptomatic. Discussed possible RA flare; however, this is not consistent with her usual flares. HPI and tenderness on exam suspecting bursitis. Education provided that this is usually self limited.   - Continue with ice, heat,  rest, Tylenol, ibuprofen, topical anesthetic such as bengay, biofreeze, lidocaine.  - If no improvement or worsening in 1-2 weeks follow-up.          FUTURE APPOINTMENTS:       - Follow-up visit: No improvement or worsening symptoms.     Cece De La Cruz CNP  [facility] AND \[facility] [place] [facility]\""

## 2019-04-18 NOTE — TELEPHONE ENCOUNTER
Pharmacy is stating there is an interaction with the Methotrexate, and are wondering if patient can have a Z-pack instead due to no interaction.  Sylvester Lawrence LPN ..............4/18/2019 2:49 PM

## 2019-04-19 ASSESSMENT — ANXIETY QUESTIONNAIRES: GAD7 TOTAL SCORE: 0

## 2019-05-07 ENCOUNTER — HOSPITAL ENCOUNTER (OUTPATIENT)
Dept: MAMMOGRAPHY | Facility: OTHER | Age: 79
Discharge: HOME | End: 2019-05-07
Attending: NURSE PRACTITIONER | Admitting: NURSE PRACTITIONER
Payer: MEDICARE

## 2019-05-07 DIAGNOSIS — Z12.31 VISIT FOR SCREENING MAMMOGRAM: ICD-10-CM

## 2019-05-07 PROCEDURE — 77063 BREAST TOMOSYNTHESIS BI: CPT

## 2019-05-30 ENCOUNTER — OFFICE VISIT (OUTPATIENT)
Dept: FAMILY MEDICINE | Facility: OTHER | Age: 79
End: 2019-05-30
Attending: NURSE PRACTITIONER
Payer: MEDICARE

## 2019-05-30 VITALS
TEMPERATURE: 98.3 F | DIASTOLIC BLOOD PRESSURE: 70 MMHG | BODY MASS INDEX: 24.99 KG/M2 | RESPIRATION RATE: 16 BRPM | SYSTOLIC BLOOD PRESSURE: 116 MMHG | WEIGHT: 154.8 LBS | OXYGEN SATURATION: 96 % | HEART RATE: 84 BPM

## 2019-05-30 DIAGNOSIS — L57.0 ACTINIC KERATOSIS: Primary | ICD-10-CM

## 2019-05-30 PROCEDURE — 17003 DESTRUCT PREMALG LES 2-14: CPT

## 2019-05-30 PROCEDURE — 17110 DESTRUCTION B9 LES UP TO 14: CPT | Performed by: NURSE PRACTITIONER

## 2019-05-30 PROCEDURE — G0463 HOSPITAL OUTPT CLINIC VISIT: HCPCS

## 2019-05-30 PROCEDURE — 17000 DESTRUCT PREMALG LESION: CPT | Performed by: NURSE PRACTITIONER

## 2019-05-30 PROCEDURE — 17003 DESTRUCT PREMALG LES 2-14: CPT | Mod: XU | Performed by: NURSE PRACTITIONER

## 2019-05-30 ASSESSMENT — PAIN SCALES - GENERAL: PAINLEVEL: MILD PAIN (2)

## 2019-05-30 NOTE — PATIENT INSTRUCTIONS
Patient Education     Understanding Actinic Keratosis    Actinic keratosis is a skin growth caused by sun damage. These growths are not cancer, but they may develop into skin cancer (precancerous). Many people get these growths, especially as they age. This is because of cumulative sun exposure over years. Multiple growths are called actinic keratoses.  What causes actinic keratosis?  Sun damage causes actinic keratosis. These growths usually appear on skin that is most often exposed to the sun, such as the face, ears, or back of the hands. People who easily sunburn are likely to develop actinic keratoses. Actinic keratoses often appear later in life from cumulative, extensive sun exposure.  What are the symptoms of actinic keratosis?  Actinic keratosis growths may be described as:    Rough, like sandpaper    Wart-like    Scaly or scabby    More easily felt than seen  They may appear singly or in clusters. They may start out as red patches, and the skin around them may be discolored.  Actinic keratoses usually are not painful. For some people they may make the skin feel tender.  How is actinic keratosis treated?  Because actinic keratoses may develop into skin cancer, a healthcare provider should look at them. Your healthcare provider may choose to remove one or more of these growths. It may be examined under a microscope. This is called a biopsy. You may also wish to have actinic keratoses removed if they bother you. They can be removed in several ways:    By using a medicine on the skin such as 5 fluorouracil or imiquimod    By removing them with a scalpel    By freezing them with liquid nitrogen  How can I prevent actinic keratoses?  Avoiding sun damage to your skin is the best way to prevent actinic keratoses. Here are some ways to protect your skin:    Use sunscreen with an SPF of 30 or higher on exposed skin when you are outside.    Wear a hat to protect your face and scalp. Consider wearing clothing that  covers your arms and legs.    Avoid the sun in the middle of the day, when sunlight is most direct.    Be aware of how long you have been out in the sun. Reapply sunscreen according to package directions.    Do not use tanning beds.  What are the complications of actinic keratosis?  Actinic keratoses are a sign of skin damage. They may become cancerous. It s a good idea to ask your healthcare provider to check new skin growths. Report any skin problem that concerns you.  When should I call my healthcare provider?  Call your healthcare provider right away if any of these occur:    You have an actinic keratosis sore that does not respond to treatment    You have an actinic keratosis sore that does not heal within a few weeks, or heals and then comes back    You have a skin growth that is changing in size, shape, or color    You have a skin growth that looks different on one side from the other    You have a skin growth that is not the same color throughout   Date Last Reviewed: 5/1/2016 2000-2018 [employer]. [address] [place] [address]. All rights reserved. This information is not intended as a substitute for professional medical care. Always follow your healthcare professional's instructions.           Patient Education     Understanding Seborrheic Dermatitis    Seborrheic dermatitis is a common type of rash that causes red, scaly, greasy skin. It occurs on skin that has oil glands, such as the face, scalp, and upper chest. It tends to last a long time, or go away and come back. Seborrheic dermatitis is not spread from person to person.  How to say it  [id number]   What causes seborrheic dermatitis?  The cause is not yet known. It may be partly caused by a type of yeast that grows on skin, along with excess oil production. Experts are still learning more. It s more common in men than women, and it can occur at any age. It happens more often in people with HIV/AIDS,  Parkinson disease, alcoholic pancreatitis, hepatitis, or cancer. It can also get worse during times of stress.  Symptoms of seborrheic dermatitis  Symptoms can include skin that is:    Bumpy    Covered with yellow scales or crusts    Cracked    Greasy    Itchy    Leaking fluid    Painful    Red or orange  These symptoms can occur on skin:    Around the nose    Behind the ears    In the beard    In the eyebrows    On the scalp, also known as dandruff    On the upper chest  You may also have acne, inflamed eyelids (blepharitis), or other skin conditions at the same time.  Treatment for seborrheic dermatitis  Treatment can reduce symptoms for a period of time. The types of treatments most often used include:    Antifungal shampoo, body wash, or cream. These contain medicines such as ketoconazole, fluconazole, selenium sulfide, ciclopirox, or tea tree oil.    Corticosteroid cream or ointment. These containmedicines such ashydrocortisone or fluocinolone acetonide.    Calcineurin inhibitorcream or ointment. These contain medicines such as pimecrolimus or tacrolimus.    Shampoo or cream with other medicines. These contain medicines such as coal tar, salicylic acid, or zinc pyrithione.    Sodium sulfacetemide creams and washes. These may also help.  Wash your skin gently. You can remove scales with oil and gentle rubbing or a brush.  Living with seborrheic dermatitis  Seborrheic dermatitis is an ongoing (chronic) condition. It can go away and then come back. You will likely need to use shampoo, cream, or ointment with medicine once or twice a week. This can help to keep symptoms from coming back or getting worse.  When to call your healthcare provider  Call your healthcare provider right away if you have any of these:    Symptoms that don t get better, or get worse    New symptoms   Date Last Reviewed: 5/1/2016 2000-2018 [employer]. [address] [place] [address], [place] [address]. All rights reserved. This  information is not intended as a substitute for professional medical care. Always follow your healthcare professional's instructions.

## 2019-05-30 NOTE — PROGRESS NOTES
"Subjective     Verena Wise is a 79 year old female who presents to clinic today for the following health issues:    HPI     Concern - Skin Lesion  Onset: \"quite a long time\"    Description: dry, sometimes itchy    Intensity: no discomfort    Progression of Symptoms:  same    Accompanying Signs & Symptoms: Denies fever, chills, body aches, unintentional weight loss    Previous history of similar problem: uncertain, thinks she may have had lesions \"frozen off\" in the past.    Precipitating factors:   Worsened by: nothing    Alleviating factors:  Improved by: nothing    Therapies Tried and outcome: nothing.      Patient Active Problem List   Diagnosis     Allergic rhinitis     Arthritis, rheumatoid (H)     Lumbago     Borderline high cholesterol     Solitary cyst of breast     Chronic steroid use     Malaise and fatigue     Pain in joint, pelvic region and thigh     Lactose intolerance     Disorder of bone and cartilage     Other affections of shoulder region, not elsewhere classified     Sjogren's syndrome (H)     Spondylosis, cervical     Phlebitis and thrombophlebitis of superficial vessels of lower extremities     Varices of other sites     Past Surgical History:   Procedure Laterality Date     BIOPSY BREAST      ,Left breast biopsy.     BIOPSY BREAST      ,Stereotactic right breast biopsy on  for mildly proliferative benign breast disease     CHOLECYSTECTOMY           COLONOSCOPY      ,Colonoscopy negative     COLONOSCOPY       05,next colonoscopy due in .     OTHER SURGICAL HISTORY      ,,HERNIA REPAIR,Umbilical hernia repair       Social History     Tobacco Use     Smoking status: Never Smoker     Smokeless tobacco: Never Used   Substance Use Topics     Alcohol use: No     Alcohol/week: 0.0 oz     Family History   Problem Relation Age of Onset     Breast Cancer Sister          62 of breast cancer     Hypertension Father         Hypertension     Heart Disease Father "         Heart Disease     Other - See Comments Father 78        Stroke,Massive heart attack  age 78     Hypertension Mother         Hypertension     Heart Disease Mother 85        Heart Disease,Mother  at age 85 of hypertension and heart disease, was born with a cataract/glaucoma.     Other - See Comments Maternal Grandfather         Stroke, stroke and heart attack.     Heart Disease Maternal Grandfather         Heart Disease     Colon Cancer Maternal Uncle         Cancer-colon,  in 80s with colon cancer     Other - See Comments Maternal Uncle         Hodgkin's     Heart Disease Brother         Heart Disease     Family History Negative Child         Good Health     Family History Negative Child         Good Health     Family History Negative Child         Good Health     Family History Negative Other         Good Health,Spouse.     Breast Cancer Sister 43        Cancer-breast,  age 43     Breast Cancer Maternal Aunt         Cancer-breast         Current Outpatient Medications   Medication Sig Dispense Refill     ascorbic acid (VITAMIN C) 1000 MG TABS Take by mouth daily 30 tablet      aspirin 81 MG chewable tablet Take 81 mg by mouth daily with food       CALCIUM CARBONATE-VIT D-MIN PO Take 2 capsules by mouth       calcium carbonate-vitamin D 600-400 MG-UNIT CHEW Take 1 chew tab by mouth 2 times daily 180 tablet 3     cycloSPORINE (RESTASIS) 0.05 % ophthalmic emulsion Place 1 drop into both eyes 2 times daily       Flaxseed Oil OIL As directed once daily.         folic acid (FOLVITE) 1 MG tablet Take 2 mg by mouth daily       Rosalva, Zingiber officinalis, (ROSALVA EXTRACT) 250 MG CAPS Take 1 capsule by mouth daily 120 capsule      Lactobacillus (PROBIOTIC ACIDOPHILUS PO)        Methotrexate, Anti-Rheumatic, (METHOTREXATE, PF,) 25 MG/ML SOLN        Omega-3 Fatty Acids (SALMON OIL-1000 PO)        predniSONE (DELTASONE) 1 MG tablet Take 1 mg by mouth daily with food       triamcinolone (KENALOG) 0.5  % cream Apply sparingly to affected area two times daily. 30 g 3     Turmeric Curcumin 500 MG CAPS        VOLTAREN 1 % GEL topical gel Apply to joints as needed.       Allergies   Allergen Reactions     Codeine Nausea     Other reaction(s): Dizziness  Other reaction(s): Dizziness     Recent Labs   Lab Test 03/01/19  0839 11/26/18  1045 10/02/18  1558  10/24/17  1004  10/13/16  1110  04/03/13  0922   LDL  --   --   --   --  146*  --   --   --  162*   HDL  --   --   --   --  86  --   --   --  79   TRIG  --   --   --   --  68  --   --   --  70   ALT 16 13 16   < >  --   --   --   --   --    CR 1.08 0.98 0.90   < >  --    < > 1.00   < > 0.87   GFRESTIMATED 49* 55* 61   < >  --   --   --   --   --    GFRESTBLACK 59* 66 73   < >  --    < > >60   < > >60   POTASSIUM  --   --  3.9  --   --   --  4.1   < > 4.3    < > = values in this interval not displayed.          Reviewed and updated as needed this visit by Provider         Review of Systems   ROS COMP: Constitutional, HEENT, cardiovascular, pulmonary, gi and gu systems are negative, except as otherwise noted.      Objective    /70   Pulse 84   Temp 98.3  F (36.8  C) (Tympanic)   Resp 16   Wt 70.2 kg (154 lb 12.8 oz)   SpO2 96%   BMI 24.99 kg/m    Body mass index is 24.99 kg/m .  Physical Exam   GENERAL: healthy, alert and no distress  EYES: Eyes grossly normal to inspection  SKIN: Left scapular area: slightly raised, brown, slightly rough lesions; mid-thoracic are: slightly raised, brown, slightly rough lesion. These present the same, consistent with keratoses, risks and benefits of cryotherapy dicussed- patient opted to proceed. Liquid nitrogen to each lesion x 3 freeze, thaw cycles.   PSYCH: mentation appears normal, affect normal/bright    Diagnostic Test Results:  Labs reviewed in Epic        Assessment & Plan     1. Actinic keratosis  Chronic. Patient tolerated cryotherapy. S/s requiring follow-up discussed.       BMI:   Estimated body mass index is  "24.99 kg/m  as calculated from the following:    Height as of 4/18/19: 1.676 m (5' 6\").    Weight as of this encounter: 70.2 kg (154 lb 12.8 oz).           FUTURE APPOINTMENTS:       - Follow-up visit: As above    No follow-ups on file.    Cece De La Cruz, [facility] AND [facility]        "

## 2019-05-30 NOTE — NURSING NOTE
Pt presents to clinic today for mole on back to be removed.      Medication Reconciliation: complete  Linda Olsen LPN

## 2019-07-16 ENCOUNTER — OFFICE VISIT (OUTPATIENT)
Dept: INTERNAL MEDICINE | Facility: OTHER | Age: 79
End: 2019-07-16
Attending: NURSE PRACTITIONER
Payer: MEDICARE

## 2019-07-16 VITALS
HEIGHT: 65 IN | OXYGEN SATURATION: 93 % | TEMPERATURE: 97.4 F | HEART RATE: 72 BPM | SYSTOLIC BLOOD PRESSURE: 122 MMHG | DIASTOLIC BLOOD PRESSURE: 80 MMHG | BODY MASS INDEX: 26.04 KG/M2 | WEIGHT: 156.3 LBS | RESPIRATION RATE: 16 BRPM

## 2019-07-16 DIAGNOSIS — R53.83 FATIGUE, UNSPECIFIED TYPE: ICD-10-CM

## 2019-07-16 DIAGNOSIS — M05.79 RHEUMATOID ARTHRITIS OF MULTIPLE SITES WITHOUT ORGAN OR SYSTEM INVOLVEMENT WITH POSITIVE RHEUMATOID FACTOR (H): ICD-10-CM

## 2019-07-16 DIAGNOSIS — M06.9 RHEUMATOID ARTHRITIS INVOLVING MULTIPLE SITES, UNSPECIFIED RHEUMATOID FACTOR PRESENCE: ICD-10-CM

## 2019-07-16 DIAGNOSIS — Z79.899 HIGH RISK MEDICATION USE: ICD-10-CM

## 2019-07-16 DIAGNOSIS — Z01.818 PRE-OP EXAM: Primary | ICD-10-CM

## 2019-07-16 DIAGNOSIS — L57.0 AK (ACTINIC KERATOSIS): ICD-10-CM

## 2019-07-16 DIAGNOSIS — Z79.52 CURRENT CHRONIC USE OF SYSTEMIC STEROIDS: ICD-10-CM

## 2019-07-16 DIAGNOSIS — M35.00 SJOGREN'S SYNDROME, WITH UNSPECIFIED ORGAN INVOLVEMENT (H): ICD-10-CM

## 2019-07-16 LAB
ALBUMIN SERPL-MCNC: 4.3 G/DL (ref 3.5–5.7)
ALT SERPL W P-5'-P-CCNC: 15 U/L (ref 7–52)
BASOPHILS # BLD AUTO: 0.1 10E9/L (ref 0–0.2)
BASOPHILS NFR BLD AUTO: 1.7 %
CREAT SERPL-MCNC: 0.93 MG/DL (ref 0.6–1.2)
CRP SERPL-MCNC: 0.1 MG/L
DIFFERENTIAL METHOD BLD: NORMAL
EOSINOPHIL # BLD AUTO: 0.2 10E9/L (ref 0–0.7)
EOSINOPHIL NFR BLD AUTO: 2.9 %
ERYTHROCYTE [DISTWIDTH] IN BLOOD BY AUTOMATED COUNT: 13.8 % (ref 10–15)
ERYTHROCYTE [SEDIMENTATION RATE] IN BLOOD BY WESTERGREN METHOD: 10 MM/H (ref 1–15)
GFR SERPL CREATININE-BSD FRML MDRD: 58 ML/MIN/{1.73_M2}
HCT VFR BLD AUTO: 40.8 % (ref 35–47)
HGB BLD-MCNC: 13.1 G/DL (ref 11.7–15.7)
IMM GRANULOCYTES # BLD: 0 10E9/L (ref 0–0.4)
IMM GRANULOCYTES NFR BLD: 0.2 %
LYMPHOCYTES # BLD AUTO: 1.1 10E9/L (ref 0.8–5.3)
LYMPHOCYTES NFR BLD AUTO: 20.7 %
MCH RBC QN AUTO: 31.6 PG (ref 26.5–33)
MCHC RBC AUTO-ENTMCNC: 32.1 G/DL (ref 31.5–36.5)
MCV RBC AUTO: 99 FL (ref 78–100)
MONOCYTES # BLD AUTO: 0.5 10E9/L (ref 0–1.3)
MONOCYTES NFR BLD AUTO: 10.3 %
NEUTROPHILS # BLD AUTO: 3.4 10E9/L (ref 1.6–8.3)
NEUTROPHILS NFR BLD AUTO: 64.2 %
PLATELET # BLD AUTO: 267 10E9/L (ref 150–450)
RBC # BLD AUTO: 4.14 10E12/L (ref 3.8–5.2)
TSH SERPL DL<=0.05 MIU/L-ACNC: 2.37 IU/ML (ref 0.34–5.6)
WBC # BLD AUTO: 5.2 10E9/L (ref 4–11)

## 2019-07-16 PROCEDURE — 85025 COMPLETE CBC W/AUTO DIFF WBC: CPT | Mod: ZL

## 2019-07-16 PROCEDURE — 84460 ALANINE AMINO (ALT) (SGPT): CPT | Mod: ZL

## 2019-07-16 PROCEDURE — 93005 ELECTROCARDIOGRAM TRACING: CPT | Performed by: NURSE PRACTITIONER

## 2019-07-16 PROCEDURE — 93010 ELECTROCARDIOGRAM REPORT: CPT | Performed by: INTERNAL MEDICINE

## 2019-07-16 PROCEDURE — G0463 HOSPITAL OUTPT CLINIC VISIT: HCPCS | Mod: 25

## 2019-07-16 PROCEDURE — 86140 C-REACTIVE PROTEIN: CPT | Mod: ZL

## 2019-07-16 PROCEDURE — 99214 OFFICE O/P EST MOD 30 MIN: CPT | Mod: 25 | Performed by: NURSE PRACTITIONER

## 2019-07-16 PROCEDURE — 17003 DESTRUCT PREMALG LES 2-14: CPT | Performed by: NURSE PRACTITIONER

## 2019-07-16 PROCEDURE — 82040 ASSAY OF SERUM ALBUMIN: CPT | Mod: ZL

## 2019-07-16 PROCEDURE — 17000 DESTRUCT PREMALG LESION: CPT | Performed by: NURSE PRACTITIONER

## 2019-07-16 PROCEDURE — 85652 RBC SED RATE AUTOMATED: CPT | Mod: ZL

## 2019-07-16 PROCEDURE — G0463 HOSPITAL OUTPT CLINIC VISIT: HCPCS

## 2019-07-16 PROCEDURE — 84443 ASSAY THYROID STIM HORMONE: CPT | Mod: ZL | Performed by: NURSE PRACTITIONER

## 2019-07-16 PROCEDURE — 36415 COLL VENOUS BLD VENIPUNCTURE: CPT | Mod: ZL | Performed by: NURSE PRACTITIONER

## 2019-07-16 PROCEDURE — 82565 ASSAY OF CREATININE: CPT | Mod: ZL

## 2019-07-16 ASSESSMENT — PAIN SCALES - GENERAL: PAINLEVEL: NO PAIN (0)

## 2019-07-16 ASSESSMENT — MIFFLIN-ST. JEOR: SCORE: 1187.34

## 2019-07-16 NOTE — NURSING NOTE
"Chief Complaint   Patient presents with     Pre-Op Exam       Medication Reconciliation: complete  Payal Gardner LPN  ..................7/16/2019   8:39 AM   Date of Surgery: 7-30-19  Type of Surgery: left cataract  Surgeon: Dr Correa  Right cataract on 8-13    Fever/Chills or other infectious symptoms in past month: no  >10lb weight loss in past two months: no    Health Care Directive/Code status:  no  Hx of bloodtransfusions:   no   Td up to date:  7-14-05  History of VRE/MRSA:  no    Preoperative Evaluation: Obstructive Sleep Apnea screening    S:Snore -  Do you snore loudly? (louder than talking or loud enough to be heard through closed doors)no  T: Tired - Do you often feeltired, fatigued, or sleepy during the daytime?yes  O: Observed - Has anyone ever observed you stop breathing during your sleep?no  P: Pressure - Do you have or are you being treated for high blood pressure?no  B: BMI - BMI greater than 35kg/m2?no 26  A: Age - Age over 50 years old?yes  N: Neck - Neck circumferencegreater than 40 cm?no 33  G:Gender - Gender: Male?no    Totalnumber of \"YES\" responses:  2    Scoring: Low risk of CALI 0-2  At Risk of CALI: >3 High Risk ofOSA: 5-8    Payal Gardner LPN....................  7/16/2019   8:39 AM    "
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
## 2019-08-23 ENCOUNTER — OFFICE VISIT (OUTPATIENT)
Dept: INTERNAL MEDICINE | Facility: OTHER | Age: 79
End: 2019-08-23
Attending: NURSE PRACTITIONER
Payer: MEDICARE

## 2019-08-23 VITALS
BODY MASS INDEX: 25.67 KG/M2 | RESPIRATION RATE: 16 BRPM | OXYGEN SATURATION: 95 % | HEART RATE: 81 BPM | TEMPERATURE: 97.8 F | SYSTOLIC BLOOD PRESSURE: 140 MMHG | DIASTOLIC BLOOD PRESSURE: 80 MMHG | WEIGHT: 155 LBS

## 2019-08-23 DIAGNOSIS — L57.0 AK (ACTINIC KERATOSIS): Primary | ICD-10-CM

## 2019-08-23 PROCEDURE — 17000 DESTRUCT PREMALG LESION: CPT | Performed by: NURSE PRACTITIONER

## 2019-08-23 PROCEDURE — 17003 DESTRUCT PREMALG LES 2-14: CPT | Performed by: NURSE PRACTITIONER

## 2019-08-23 PROCEDURE — G0463 HOSPITAL OUTPT CLINIC VISIT: HCPCS

## 2019-08-23 ASSESSMENT — PAIN SCALES - GENERAL: PAINLEVEL: MILD PAIN (2)
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
## 2019-08-23 NOTE — NURSING NOTE
Chief Complaint   Patient presents with     RECHECK     moles       Medication Reconciliation: complete  Payal Gardner LPN  ..................8/23/2019   10:41 AM

## 2019-10-01 ENCOUNTER — OFFICE VISIT (OUTPATIENT)
Dept: INTERNAL MEDICINE | Facility: OTHER | Age: 79
End: 2019-10-01
Attending: NURSE PRACTITIONER
Payer: MEDICARE

## 2019-10-01 VITALS
OXYGEN SATURATION: 96 % | TEMPERATURE: 97.4 F | RESPIRATION RATE: 16 BRPM | WEIGHT: 156.9 LBS | HEART RATE: 81 BPM | DIASTOLIC BLOOD PRESSURE: 76 MMHG | BODY MASS INDEX: 25.98 KG/M2 | SYSTOLIC BLOOD PRESSURE: 120 MMHG

## 2019-10-01 DIAGNOSIS — L57.0 AK (ACTINIC KERATOSIS): Primary | ICD-10-CM

## 2019-10-01 DIAGNOSIS — Z23 NEED FOR IMMUNIZATION AGAINST INFLUENZA: ICD-10-CM

## 2019-10-01 PROCEDURE — 90662 IIV NO PRSV INCREASED AG IM: CPT

## 2019-10-01 PROCEDURE — G0463 HOSPITAL OUTPT CLINIC VISIT: HCPCS

## 2019-10-01 PROCEDURE — 17000 DESTRUCT PREMALG LESION: CPT | Performed by: NURSE PRACTITIONER

## 2019-10-01 PROCEDURE — G0008 ADMIN INFLUENZA VIRUS VAC: HCPCS

## 2019-10-01 ASSESSMENT — PAIN SCALES - GENERAL: PAINLEVEL: MILD PAIN (2)
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
## 2019-10-15 ENCOUNTER — MEDICAL CORRESPONDENCE (OUTPATIENT)
Dept: LAB | Facility: OTHER | Age: 79
End: 2019-10-15

## 2019-10-15 DIAGNOSIS — M05.79 SEROPOSITIVE RHEUMATOID ARTHRITIS OF MULTIPLE SITES (H): Primary | ICD-10-CM

## 2019-10-15 LAB
ALT SERPL W P-5'-P-CCNC: 12 U/L (ref 7–52)
BASOPHILS # BLD AUTO: 0.1 10E9/L (ref 0–0.2)
BASOPHILS NFR BLD AUTO: 1.4 %
CREAT SERPL-MCNC: 1.08 MG/DL (ref 0.6–1.2)
CRP SERPL-MCNC: 0.1 MG/L
DIFFERENTIAL METHOD BLD: NORMAL
EOSINOPHIL # BLD AUTO: 0.2 10E9/L (ref 0–0.7)
EOSINOPHIL NFR BLD AUTO: 3 %
ERYTHROCYTE [DISTWIDTH] IN BLOOD BY AUTOMATED COUNT: 13.9 % (ref 10–15)
ERYTHROCYTE [SEDIMENTATION RATE] IN BLOOD BY WESTERGREN METHOD: 13 MM/H (ref 1–15)
GFR SERPL CREATININE-BSD FRML MDRD: 49 ML/MIN/{1.73_M2}
HCT VFR BLD AUTO: 39.5 % (ref 35–47)
HGB BLD-MCNC: 12.9 G/DL (ref 11.7–15.7)
IMM GRANULOCYTES # BLD: 0 10E9/L (ref 0–0.4)
IMM GRANULOCYTES NFR BLD: 0.2 %
LYMPHOCYTES # BLD AUTO: 1.3 10E9/L (ref 0.8–5.3)
LYMPHOCYTES NFR BLD AUTO: 22.6 %
MCH RBC QN AUTO: 32 PG (ref 26.5–33)
MCHC RBC AUTO-ENTMCNC: 32.7 G/DL (ref 31.5–36.5)
MCV RBC AUTO: 98 FL (ref 78–100)
MONOCYTES # BLD AUTO: 0.5 10E9/L (ref 0–1.3)
MONOCYTES NFR BLD AUTO: 9.1 %
NEUTROPHILS # BLD AUTO: 3.6 10E9/L (ref 1.6–8.3)
NEUTROPHILS NFR BLD AUTO: 63.7 %
PLATELET # BLD AUTO: 271 10E9/L (ref 150–450)
RBC # BLD AUTO: 4.03 10E12/L (ref 3.8–5.2)
WBC # BLD AUTO: 5.6 10E9/L (ref 4–11)

## 2019-10-15 PROCEDURE — 85652 RBC SED RATE AUTOMATED: CPT | Mod: ZL

## 2019-10-15 PROCEDURE — 84460 ALANINE AMINO (ALT) (SGPT): CPT | Mod: ZL

## 2019-10-15 PROCEDURE — 86140 C-REACTIVE PROTEIN: CPT | Mod: ZL

## 2019-10-15 PROCEDURE — 36415 COLL VENOUS BLD VENIPUNCTURE: CPT | Mod: ZL

## 2019-10-15 PROCEDURE — 82565 ASSAY OF CREATININE: CPT | Mod: ZL

## 2019-10-15 PROCEDURE — 85025 COMPLETE CBC W/AUTO DIFF WBC: CPT | Mod: ZL

## 2019-10-28 ENCOUNTER — OFFICE VISIT (OUTPATIENT)
Dept: FAMILY MEDICINE | Facility: OTHER | Age: 79
End: 2019-10-28
Attending: FAMILY MEDICINE
Payer: COMMERCIAL

## 2019-10-28 ENCOUNTER — TELEPHONE (OUTPATIENT)
Dept: FAMILY MEDICINE | Facility: OTHER | Age: 79
End: 2019-10-28

## 2019-10-28 VITALS
RESPIRATION RATE: 16 BRPM | OXYGEN SATURATION: 98 % | BODY MASS INDEX: 25.9 KG/M2 | SYSTOLIC BLOOD PRESSURE: 124 MMHG | DIASTOLIC BLOOD PRESSURE: 72 MMHG | WEIGHT: 156.4 LBS | TEMPERATURE: 98.6 F | HEART RATE: 77 BPM

## 2019-10-28 DIAGNOSIS — J01.10 ACUTE NON-RECURRENT FRONTAL SINUSITIS: ICD-10-CM

## 2019-10-28 DIAGNOSIS — J20.9 ACUTE BRONCHITIS, UNSPECIFIED ORGANISM: Primary | ICD-10-CM

## 2019-10-28 PROCEDURE — 99213 OFFICE O/P EST LOW 20 MIN: CPT | Performed by: FAMILY MEDICINE

## 2019-10-28 PROCEDURE — G0463 HOSPITAL OUTPT CLINIC VISIT: HCPCS

## 2019-10-28 RX ORDER — AZITHROMYCIN 250 MG/1
TABLET, FILM COATED ORAL
Qty: 6 TABLET | Refills: 0 | Status: SHIPPED | OUTPATIENT
Start: 2019-10-28 | End: 2019-12-03

## 2019-10-28 ASSESSMENT — ANXIETY QUESTIONNAIRES
7. FEELING AFRAID AS IF SOMETHING AWFUL MIGHT HAPPEN: NOT AT ALL
2. NOT BEING ABLE TO STOP OR CONTROL WORRYING: NOT AT ALL
1. FEELING NERVOUS, ANXIOUS, OR ON EDGE: NOT AT ALL
5. BEING SO RESTLESS THAT IT IS HARD TO SIT STILL: NOT AT ALL
6. BECOMING EASILY ANNOYED OR IRRITABLE: NOT AT ALL
GAD7 TOTAL SCORE: 0
3. WORRYING TOO MUCH ABOUT DIFFERENT THINGS: NOT AT ALL

## 2019-10-28 ASSESSMENT — ENCOUNTER SYMPTOMS
COUGH: 1
ABDOMINAL PAIN: 0
HEADACHES: 1
DIARRHEA: 0
RHINORRHEA: 1
SINUS PRESSURE: 1
FATIGUE: 1
CONSTIPATION: 0
APPETITE CHANGE: 0
SORE THROAT: 1
SHORTNESS OF BREATH: 1
FEVER: 0

## 2019-10-28 ASSESSMENT — PATIENT HEALTH QUESTIONNAIRE - PHQ9: 5. POOR APPETITE OR OVEREATING: NOT AT ALL

## 2019-10-28 ASSESSMENT — PAIN SCALES - GENERAL: PAINLEVEL: MODERATE PAIN (5)

## 2019-10-28 NOTE — NURSING NOTE
"Coming in for with sinus and chest congestion for three week, cough is yellow phlegm    Chief Complaint   Patient presents with     Sinus Problem     and chest congestion for three weeks, yellow phlegm       Initial /72 (BP Location: Right arm, Patient Position: Sitting, Cuff Size: Adult Large)   Pulse 77   Temp 98.6  F (37  C) (Tympanic)   Resp 16   Wt 70.9 kg (156 lb 6.4 oz)   SpO2 98%   BMI 25.90 kg/m   Estimated body mass index is 25.9 kg/m  as calculated from the following:    Height as of 7/16/19: 1.655 m (5' 5.16\").    Weight as of this encounter: 70.9 kg (156 lb 6.4 oz).  Medication Reconciliation: complete    Cheyenne Proctor LPN  "
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
## 2019-10-28 NOTE — TELEPHONE ENCOUNTER
Pharmacy states that Augmentin and methotrexate combined compete for excretion through the kidneys and can cause a higher concentration of methotrexate in the blood stream.  They recommend either increasing monitoring methotrexate levels or changing to a different antibiotics.  They are wanting to know what Wale Christianson MD would like to do.  Lashanda Deras LPN........................10/28/2019  11:44 AM

## 2019-10-29 ASSESSMENT — ANXIETY QUESTIONNAIRES: GAD7 TOTAL SCORE: 0

## 2019-12-03 ENCOUNTER — OFFICE VISIT (OUTPATIENT)
Dept: INTERNAL MEDICINE | Facility: OTHER | Age: 79
End: 2019-12-03
Attending: NURSE PRACTITIONER
Payer: COMMERCIAL

## 2019-12-03 VITALS
TEMPERATURE: 97.5 F | HEART RATE: 82 BPM | RESPIRATION RATE: 16 BRPM | WEIGHT: 155.3 LBS | BODY MASS INDEX: 25.72 KG/M2 | OXYGEN SATURATION: 96 % | DIASTOLIC BLOOD PRESSURE: 80 MMHG | SYSTOLIC BLOOD PRESSURE: 128 MMHG

## 2019-12-03 DIAGNOSIS — L85.3 DRY SKIN: Primary | ICD-10-CM

## 2019-12-03 PROCEDURE — G0463 HOSPITAL OUTPT CLINIC VISIT: HCPCS

## 2019-12-03 PROCEDURE — 99213 OFFICE O/P EST LOW 20 MIN: CPT | Performed by: NURSE PRACTITIONER

## 2019-12-03 ASSESSMENT — PAIN SCALES - GENERAL: PAINLEVEL: NO PAIN (1)
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
## 2019-12-03 NOTE — NURSING NOTE
Chief Complaint   Patient presents with     Derm Problem       Medication Reconciliation: complete  Payal Gardner LPN  ..................12/3/2019   9:52 AM

## 2020-01-07 DIAGNOSIS — M05.79 SEROPOSITIVE RHEUMATOID ARTHRITIS OF MULTIPLE SITES (H): ICD-10-CM

## 2020-01-07 LAB
ALT SERPL W P-5'-P-CCNC: 15 U/L (ref 7–52)
BASOPHILS # BLD AUTO: 0.1 10E9/L (ref 0–0.2)
BASOPHILS NFR BLD AUTO: 0.8 %
CREAT SERPL-MCNC: 0.98 MG/DL (ref 0.6–1.2)
CRP SERPL-MCNC: 0.1 MG/L
DIFFERENTIAL METHOD BLD: NORMAL
EOSINOPHIL # BLD AUTO: 0.1 10E9/L (ref 0–0.7)
EOSINOPHIL NFR BLD AUTO: 0.7 %
ERYTHROCYTE [DISTWIDTH] IN BLOOD BY AUTOMATED COUNT: 13.8 % (ref 10–15)
ERYTHROCYTE [SEDIMENTATION RATE] IN BLOOD BY WESTERGREN METHOD: 8 MM/H (ref 1–15)
GFR SERPL CREATININE-BSD FRML MDRD: 55 ML/MIN/{1.73_M2}
HCT VFR BLD AUTO: 43 % (ref 35–47)
HGB BLD-MCNC: 13.7 G/DL (ref 11.7–15.7)
IMM GRANULOCYTES # BLD: 0 10E9/L (ref 0–0.4)
IMM GRANULOCYTES NFR BLD: 0.3 %
LYMPHOCYTES # BLD AUTO: 0.9 10E9/L (ref 0.8–5.3)
LYMPHOCYTES NFR BLD AUTO: 12 %
MCH RBC QN AUTO: 31.7 PG (ref 26.5–33)
MCHC RBC AUTO-ENTMCNC: 31.9 G/DL (ref 31.5–36.5)
MCV RBC AUTO: 100 FL (ref 78–100)
MONOCYTES # BLD AUTO: 0.6 10E9/L (ref 0–1.3)
MONOCYTES NFR BLD AUTO: 7.3 %
NEUTROPHILS # BLD AUTO: 6 10E9/L (ref 1.6–8.3)
NEUTROPHILS NFR BLD AUTO: 78.9 %
PLATELET # BLD AUTO: 275 10E9/L (ref 150–450)
RBC # BLD AUTO: 4.32 10E12/L (ref 3.8–5.2)
WBC # BLD AUTO: 7.5 10E9/L (ref 4–11)

## 2020-01-07 PROCEDURE — 84460 ALANINE AMINO (ALT) (SGPT): CPT | Mod: ZL

## 2020-01-07 PROCEDURE — 82565 ASSAY OF CREATININE: CPT | Mod: ZL

## 2020-01-07 PROCEDURE — 36415 COLL VENOUS BLD VENIPUNCTURE: CPT | Mod: ZL

## 2020-01-07 PROCEDURE — 86140 C-REACTIVE PROTEIN: CPT | Mod: ZL

## 2020-01-07 PROCEDURE — 85652 RBC SED RATE AUTOMATED: CPT | Mod: ZL

## 2020-01-07 PROCEDURE — 85025 COMPLETE CBC W/AUTO DIFF WBC: CPT | Mod: ZL

## 2020-03-11 ENCOUNTER — HEALTH MAINTENANCE LETTER (OUTPATIENT)
Age: 80
End: 2020-03-11

## 2020-03-18 ENCOUNTER — TELEPHONE (OUTPATIENT)
Dept: INTERNAL MEDICINE | Facility: OTHER | Age: 80
End: 2020-03-18

## 2020-03-18 ENCOUNTER — MYC MEDICAL ADVICE (OUTPATIENT)
Dept: INTERNAL MEDICINE | Facility: OTHER | Age: 80
End: 2020-03-18

## 2020-03-20 ENCOUNTER — VIRTUAL VISIT (OUTPATIENT)
Dept: INTERNAL MEDICINE | Facility: OTHER | Age: 80
End: 2020-03-20
Attending: NURSE PRACTITIONER
Payer: COMMERCIAL

## 2020-03-20 VITALS — BODY MASS INDEX: 24.11 KG/M2 | WEIGHT: 150 LBS | HEIGHT: 66 IN

## 2020-03-20 DIAGNOSIS — M06.9 RHEUMATOID ARTHRITIS INVOLVING MULTIPLE SITES, UNSPECIFIED RHEUMATOID FACTOR PRESENCE: ICD-10-CM

## 2020-03-20 DIAGNOSIS — Z79.899 HIGH RISK MEDICATION USE: ICD-10-CM

## 2020-03-20 DIAGNOSIS — M35.00 SJOGREN'S SYNDROME, WITH UNSPECIFIED ORGAN INVOLVEMENT (H): ICD-10-CM

## 2020-03-20 DIAGNOSIS — J01.00 ACUTE NON-RECURRENT MAXILLARY SINUSITIS: Primary | ICD-10-CM

## 2020-03-20 DIAGNOSIS — Z79.52 CURRENT CHRONIC USE OF SYSTEMIC STEROIDS: ICD-10-CM

## 2020-03-20 PROCEDURE — G0463 HOSPITAL OUTPT CLINIC VISIT: HCPCS

## 2020-03-20 PROCEDURE — 99212 OFFICE O/P EST SF 10 MIN: CPT | Mod: TEL | Performed by: NURSE PRACTITIONER

## 2020-03-20 RX ORDER — AZITHROMYCIN 250 MG/1
TABLET, FILM COATED ORAL
Qty: 6 TABLET | Refills: 0 | Status: SHIPPED | OUTPATIENT
Start: 2020-03-20 | End: 2020-03-25

## 2020-03-20 ASSESSMENT — MIFFLIN-ST. JEOR: SCORE: 1172.15

## 2020-03-20 ASSESSMENT — PAIN SCALES - GENERAL: PAINLEVEL: MODERATE PAIN (5)

## 2020-03-20 NOTE — PROGRESS NOTES
"Verena Wise is a 79 year old female who is being evaluated via a billable telephone visit.      The patient has been notified of following:     \"This telephone visit will be conducted via a call between you and your physician/provider. We have found that certain health care needs can be provided without the need for a physical exam.  This service lets us provide the care you need with a short phone conversation.  If a prescription is necessary we can send it directly to your pharmacy.  If lab work is needed we can place an order for that and you can then stop by our lab to have the test done at a later time.    If during the course of the call the physician/provider feels a telephone visit is not appropriate, you will not be charged for this service.\"     Verena Wise complains of    Chief Complaint   Patient presents with     Sinus Problem     Patient states headache above left eye, left cheek pain, left eye watering for 6 days.     I have reviewed and updated the patient's Past Medical History, Social History, Family History and Medication List.    ALLERGIES  Codeine; Augmentin; and Sulfa drugs    RA, Sjogren's, chronic use of systemic steroids and methotrexate.  Additional provider notes: Patient reports that she has the same illness presentation as with previous left sinusitis.  She reports that she has a daily headache above her left eye.  She has pain and pressure into her left cheek and into her teeth.  She has had postnasal drainage which has stimulated a cough but otherwise does not have a cough.  She has a slight sore throat from the postnasal drainage.  She has not had fevers but states she usually never runs a temperature.  She has felt a little chilled.  She has been using saline rinses for her nose at least 3 times daily since this began trying to prevent a sinus infection without improvement.  She is convinced that she has another sinus infection.  She would like prescription for " azithromycin as that the antibiotic that worked before in the past with previous sinusitis.  She did not have an allergy to Augmentin but was told by the pharmacist when it was prescribed for previous sinusitis that she could not take it as it interacted with her methotrexate therefore would not want to take Augmentin.    Assessment/Plan:  1. Acute non-recurrent maxillary sinusitis  Patient will be treated with azithromycin.  If she develops fever greater than 100  F, increased sinus pain and pressure, worsening of chills or worsening of other symptoms to include cough, sore throat and influenza-like illness she needs to be evaluated further.  Would expect improvement within the next 7 days.  She is on high risk medications including methotrexate and prednisone.  She is at higher risk of developing infection.  This is all reviewed and discussed with patient and she expresses understanding.    2. Rheumatoid arthritis involving multiple sites, unspecified rheumatoid factor presence (H)    3. Sjogren's syndrome, with unspecified organ involvement (H)    4. Current chronic use of systemic steroids    5. High risk medication use        Phone call duration:  10 minutes    Kaia Quinn NP

## 2020-04-23 DIAGNOSIS — M05.79 SEROPOSITIVE RHEUMATOID ARTHRITIS OF MULTIPLE SITES (H): ICD-10-CM

## 2020-04-23 LAB
ALT SERPL W P-5'-P-CCNC: 15 U/L (ref 7–52)
BASOPHILS # BLD AUTO: 0.1 10E9/L (ref 0–0.2)
BASOPHILS NFR BLD AUTO: 1.9 %
CREAT SERPL-MCNC: 0.98 MG/DL (ref 0.6–1.2)
CRP SERPL-MCNC: 0.1 MG/L
DIFFERENTIAL METHOD BLD: NORMAL
EOSINOPHIL # BLD AUTO: 0.1 10E9/L (ref 0–0.7)
EOSINOPHIL NFR BLD AUTO: 2.7 %
ERYTHROCYTE [DISTWIDTH] IN BLOOD BY AUTOMATED COUNT: 13.9 % (ref 10–15)
ERYTHROCYTE [SEDIMENTATION RATE] IN BLOOD BY WESTERGREN METHOD: 11 MM/H (ref 1–15)
GFR SERPL CREATININE-BSD FRML MDRD: 55 ML/MIN/{1.73_M2}
HCT VFR BLD AUTO: 39.6 % (ref 35–47)
HGB BLD-MCNC: 12.7 G/DL (ref 11.7–15.7)
IMM GRANULOCYTES # BLD: 0 10E9/L (ref 0–0.4)
IMM GRANULOCYTES NFR BLD: 0.2 %
LYMPHOCYTES # BLD AUTO: 0.8 10E9/L (ref 0.8–5.3)
LYMPHOCYTES NFR BLD AUTO: 16.7 %
MCH RBC QN AUTO: 31.6 PG (ref 26.5–33)
MCHC RBC AUTO-ENTMCNC: 32.1 G/DL (ref 31.5–36.5)
MCV RBC AUTO: 99 FL (ref 78–100)
MONOCYTES # BLD AUTO: 0.5 10E9/L (ref 0–1.3)
MONOCYTES NFR BLD AUTO: 9.7 %
NEUTROPHILS # BLD AUTO: 3.3 10E9/L (ref 1.6–8.3)
NEUTROPHILS NFR BLD AUTO: 68.8 %
PLATELET # BLD AUTO: 261 10E9/L (ref 150–450)
RBC # BLD AUTO: 4.02 10E12/L (ref 3.8–5.2)
WBC # BLD AUTO: 4.7 10E9/L (ref 4–11)

## 2020-04-23 PROCEDURE — 84460 ALANINE AMINO (ALT) (SGPT): CPT | Mod: ZL

## 2020-04-23 PROCEDURE — 36415 COLL VENOUS BLD VENIPUNCTURE: CPT | Mod: ZL

## 2020-04-23 PROCEDURE — 82565 ASSAY OF CREATININE: CPT | Mod: ZL

## 2020-04-23 PROCEDURE — 86140 C-REACTIVE PROTEIN: CPT | Mod: ZL

## 2020-04-23 PROCEDURE — 85025 COMPLETE CBC W/AUTO DIFF WBC: CPT | Mod: ZL

## 2020-04-23 PROCEDURE — 85652 RBC SED RATE AUTOMATED: CPT | Mod: ZL

## 2020-06-17 ENCOUNTER — TRANSFERRED RECORDS (OUTPATIENT)
Dept: HEALTH INFORMATION MANAGEMENT | Facility: OTHER | Age: 80
End: 2020-06-17

## 2020-08-04 DIAGNOSIS — Z79.899 ENCOUNTER FOR LONG-TERM (CURRENT) USE OF OTHER MEDICATIONS: ICD-10-CM

## 2020-08-04 DIAGNOSIS — M05.79 SEROPOSITIVE RHEUMATOID ARTHRITIS OF MULTIPLE SITES (H): Primary | ICD-10-CM

## 2020-08-12 DIAGNOSIS — Z79.899 ENCOUNTER FOR LONG-TERM (CURRENT) USE OF OTHER MEDICATIONS: ICD-10-CM

## 2020-08-12 DIAGNOSIS — M05.79 SEROPOSITIVE RHEUMATOID ARTHRITIS OF MULTIPLE SITES (H): ICD-10-CM

## 2020-08-12 LAB
ALBUMIN SERPL-MCNC: 4.3 G/DL (ref 3.5–5.7)
ALP SERPL-CCNC: 58 U/L (ref 34–104)
ALT SERPL W P-5'-P-CCNC: 15 U/L (ref 7–52)
ANION GAP SERPL CALCULATED.3IONS-SCNC: 6 MMOL/L (ref 3–14)
AST SERPL W P-5'-P-CCNC: 23 U/L (ref 13–39)
BASOPHILS # BLD AUTO: 0.1 10E9/L (ref 0–0.2)
BASOPHILS NFR BLD AUTO: 1.3 %
BILIRUB SERPL-MCNC: 0.5 MG/DL (ref 0.3–1)
BUN SERPL-MCNC: 16 MG/DL (ref 7–25)
CALCIUM SERPL-MCNC: 10.4 MG/DL (ref 8.6–10.3)
CHLORIDE SERPL-SCNC: 101 MMOL/L (ref 98–107)
CO2 SERPL-SCNC: 30 MMOL/L (ref 21–31)
CREAT SERPL-MCNC: 1.03 MG/DL (ref 0.6–1.2)
CRP SERPL-MCNC: 0.1 MG/L
DIFFERENTIAL METHOD BLD: NORMAL
EOSINOPHIL # BLD AUTO: 0.1 10E9/L (ref 0–0.7)
EOSINOPHIL NFR BLD AUTO: 2.5 %
ERYTHROCYTE [DISTWIDTH] IN BLOOD BY AUTOMATED COUNT: 13.2 % (ref 10–15)
ERYTHROCYTE [SEDIMENTATION RATE] IN BLOOD BY WESTERGREN METHOD: 11 MM/H (ref 1–15)
GFR SERPL CREATININE-BSD FRML MDRD: 52 ML/MIN/{1.73_M2}
GLUCOSE SERPL-MCNC: 91 MG/DL (ref 70–105)
HCT VFR BLD AUTO: 40.5 % (ref 35–47)
HGB BLD-MCNC: 13.3 G/DL (ref 11.7–15.7)
IMM GRANULOCYTES # BLD: 0 10E9/L (ref 0–0.4)
IMM GRANULOCYTES NFR BLD: 0.2 %
LYMPHOCYTES # BLD AUTO: 1.1 10E9/L (ref 0.8–5.3)
LYMPHOCYTES NFR BLD AUTO: 21.1 %
MCH RBC QN AUTO: 31.8 PG (ref 26.5–33)
MCHC RBC AUTO-ENTMCNC: 32.8 G/DL (ref 31.5–36.5)
MCV RBC AUTO: 97 FL (ref 78–100)
MONOCYTES # BLD AUTO: 0.6 10E9/L (ref 0–1.3)
MONOCYTES NFR BLD AUTO: 10.6 %
NEUTROPHILS # BLD AUTO: 3.4 10E9/L (ref 1.6–8.3)
NEUTROPHILS NFR BLD AUTO: 64.3 %
PLATELET # BLD AUTO: 259 10E9/L (ref 150–450)
POTASSIUM SERPL-SCNC: 4.4 MMOL/L (ref 3.5–5.1)
PROT SERPL-MCNC: 7.4 G/DL (ref 6.4–8.9)
RBC # BLD AUTO: 4.18 10E12/L (ref 3.8–5.2)
SODIUM SERPL-SCNC: 137 MMOL/L (ref 134–144)
WBC # BLD AUTO: 5.3 10E9/L (ref 4–11)

## 2020-08-12 PROCEDURE — 80053 COMPREHEN METABOLIC PANEL: CPT | Mod: ZL

## 2020-08-12 PROCEDURE — 85025 COMPLETE CBC W/AUTO DIFF WBC: CPT | Mod: ZL

## 2020-08-12 PROCEDURE — 85652 RBC SED RATE AUTOMATED: CPT | Mod: ZL

## 2020-08-12 PROCEDURE — 36415 COLL VENOUS BLD VENIPUNCTURE: CPT | Mod: ZL

## 2020-08-12 PROCEDURE — 86140 C-REACTIVE PROTEIN: CPT | Mod: ZL

## 2020-09-02 ENCOUNTER — TELEPHONE (OUTPATIENT)
Dept: INTERNAL MEDICINE | Facility: OTHER | Age: 80
End: 2020-09-02

## 2020-09-02 DIAGNOSIS — J01.00 ACUTE NON-RECURRENT MAXILLARY SINUSITIS: Primary | ICD-10-CM

## 2020-09-02 RX ORDER — AZITHROMYCIN 250 MG/1
TABLET, FILM COATED ORAL
Qty: 6 TABLET | Refills: 0 | Status: SHIPPED | OUTPATIENT
Start: 2020-09-02 | End: 2020-09-07

## 2020-09-02 NOTE — TELEPHONE ENCOUNTER
Notified that she can pick her Rx up at the pharmacy.  Lupe Gloria LPN..........9/2/2020  1:26 PM    Lupe Gloria LPN..........9/2/2020  1:26 PM

## 2020-09-02 NOTE — TELEPHONE ENCOUNTER
States she knows she has a sinus infection.  Headache, green nasal drainage, pain above left eye, left cheek and her teeth hurt on left side.  She did see her dentist and this is not a tooth problem.  She is unable to take amoxicillin due to taking methotrexate for her arthritis.  She was given Z-pack last time and that helped.      Kenneth Gloria LPN..........9/2/2020  1:06 PM

## 2020-09-02 NOTE — TELEPHONE ENCOUNTER
Patient called in wanting to see if she can get meds for a sinus infection without being seen.  Please call to advise.    Arlen Jackson on 9/2/2020 at 12:41 PM

## 2020-11-04 ENCOUNTER — ALLIED HEALTH/NURSE VISIT (OUTPATIENT)
Dept: FAMILY MEDICINE | Facility: OTHER | Age: 80
End: 2020-11-04
Attending: NURSE PRACTITIONER
Payer: MEDICARE

## 2020-11-04 DIAGNOSIS — Z23 NEED FOR PROPHYLACTIC VACCINATION AND INOCULATION AGAINST INFLUENZA: Primary | ICD-10-CM

## 2020-11-04 PROCEDURE — G0008 ADMIN INFLUENZA VIRUS VAC: HCPCS

## 2020-11-13 ENCOUNTER — HOSPITAL ENCOUNTER (OUTPATIENT)
Dept: GENERAL RADIOLOGY | Facility: OTHER | Age: 80
End: 2020-11-13
Attending: NURSE PRACTITIONER
Payer: MEDICARE

## 2020-11-13 ENCOUNTER — OFFICE VISIT (OUTPATIENT)
Dept: INTERNAL MEDICINE | Facility: OTHER | Age: 80
End: 2020-11-13
Attending: NURSE PRACTITIONER
Payer: MEDICARE

## 2020-11-13 VITALS
SYSTOLIC BLOOD PRESSURE: 130 MMHG | RESPIRATION RATE: 16 BRPM | DIASTOLIC BLOOD PRESSURE: 72 MMHG | OXYGEN SATURATION: 96 % | HEART RATE: 80 BPM | HEIGHT: 65 IN | BODY MASS INDEX: 26.36 KG/M2 | WEIGHT: 158.2 LBS | TEMPERATURE: 97.5 F

## 2020-11-13 DIAGNOSIS — L30.9 DERMATITIS: ICD-10-CM

## 2020-11-13 DIAGNOSIS — Q30.9 NOSE ABNORMALITY: ICD-10-CM

## 2020-11-13 DIAGNOSIS — M35.00 SJOGREN'S SYNDROME, WITH UNSPECIFIED ORGAN INVOLVEMENT (H): Primary | ICD-10-CM

## 2020-11-13 DIAGNOSIS — M05.79 SEROPOSITIVE RHEUMATOID ARTHRITIS OF MULTIPLE SITES (H): ICD-10-CM

## 2020-11-13 DIAGNOSIS — Z79.899 ENCOUNTER FOR LONG-TERM (CURRENT) USE OF OTHER MEDICATIONS: ICD-10-CM

## 2020-11-13 DIAGNOSIS — L71.9 ROSACEA: ICD-10-CM

## 2020-11-13 DIAGNOSIS — Z12.31 ENCOUNTER FOR SCREENING MAMMOGRAM FOR BREAST CANCER: ICD-10-CM

## 2020-11-13 DIAGNOSIS — Z79.52 CURRENT CHRONIC USE OF SYSTEMIC STEROIDS: ICD-10-CM

## 2020-11-13 DIAGNOSIS — M06.9 RHEUMATOID ARTHRITIS INVOLVING MULTIPLE SITES, UNSPECIFIED WHETHER RHEUMATOID FACTOR PRESENT (H): ICD-10-CM

## 2020-11-13 LAB
ALBUMIN SERPL-MCNC: 4.3 G/DL (ref 3.5–5.7)
ALP SERPL-CCNC: 61 U/L (ref 34–104)
ALT SERPL W P-5'-P-CCNC: 15 U/L (ref 7–52)
ANION GAP SERPL CALCULATED.3IONS-SCNC: 6 MMOL/L (ref 3–14)
AST SERPL W P-5'-P-CCNC: 19 U/L (ref 13–39)
BASOPHILS # BLD AUTO: 0.1 10E9/L (ref 0–0.2)
BASOPHILS NFR BLD AUTO: 1.6 %
BILIRUB SERPL-MCNC: 0.4 MG/DL (ref 0.3–1)
BUN SERPL-MCNC: 16 MG/DL (ref 7–25)
CALCIUM SERPL-MCNC: 10.4 MG/DL (ref 8.6–10.3)
CHLORIDE SERPL-SCNC: 103 MMOL/L (ref 98–107)
CO2 SERPL-SCNC: 30 MMOL/L (ref 21–31)
CREAT SERPL-MCNC: 0.9 MG/DL (ref 0.6–1.2)
CRP SERPL-MCNC: 1 MG/L
DIFFERENTIAL METHOD BLD: NORMAL
EOSINOPHIL # BLD AUTO: 0.1 10E9/L (ref 0–0.7)
EOSINOPHIL NFR BLD AUTO: 2.6 %
ERYTHROCYTE [DISTWIDTH] IN BLOOD BY AUTOMATED COUNT: 14.2 % (ref 10–15)
ERYTHROCYTE [SEDIMENTATION RATE] IN BLOOD BY WESTERGREN METHOD: 11 MM/H (ref 1–15)
GFR SERPL CREATININE-BSD FRML MDRD: 60 ML/MIN/{1.73_M2}
GLUCOSE SERPL-MCNC: 104 MG/DL (ref 70–105)
HCT VFR BLD AUTO: 40.8 % (ref 35–47)
HGB BLD-MCNC: 13.2 G/DL (ref 11.7–15.7)
IMM GRANULOCYTES # BLD: 0 10E9/L (ref 0–0.4)
IMM GRANULOCYTES NFR BLD: 0.4 %
LYMPHOCYTES # BLD AUTO: 0.9 10E9/L (ref 0.8–5.3)
LYMPHOCYTES NFR BLD AUTO: 17.2 %
MCH RBC QN AUTO: 31.8 PG (ref 26.5–33)
MCHC RBC AUTO-ENTMCNC: 32.4 G/DL (ref 31.5–36.5)
MCV RBC AUTO: 98 FL (ref 78–100)
MONOCYTES # BLD AUTO: 0.6 10E9/L (ref 0–1.3)
MONOCYTES NFR BLD AUTO: 10.3 %
NEUTROPHILS # BLD AUTO: 3.7 10E9/L (ref 1.6–8.3)
NEUTROPHILS NFR BLD AUTO: 67.9 %
PLATELET # BLD AUTO: 263 10E9/L (ref 150–450)
POTASSIUM SERPL-SCNC: 4.8 MMOL/L (ref 3.5–5.1)
PROT SERPL-MCNC: 7.1 G/DL (ref 6.4–8.9)
RBC # BLD AUTO: 4.15 10E12/L (ref 3.8–5.2)
SODIUM SERPL-SCNC: 139 MMOL/L (ref 134–144)
WBC # BLD AUTO: 5.5 10E9/L (ref 4–11)

## 2020-11-13 PROCEDURE — 80053 COMPREHEN METABOLIC PANEL: CPT | Mod: ZL

## 2020-11-13 PROCEDURE — 36415 COLL VENOUS BLD VENIPUNCTURE: CPT | Mod: ZL

## 2020-11-13 PROCEDURE — 70160 X-RAY EXAM OF NASAL BONES: CPT

## 2020-11-13 PROCEDURE — 85652 RBC SED RATE AUTOMATED: CPT | Mod: ZL

## 2020-11-13 PROCEDURE — 86140 C-REACTIVE PROTEIN: CPT | Mod: ZL

## 2020-11-13 PROCEDURE — G0463 HOSPITAL OUTPT CLINIC VISIT: HCPCS

## 2020-11-13 PROCEDURE — 99214 OFFICE O/P EST MOD 30 MIN: CPT | Performed by: NURSE PRACTITIONER

## 2020-11-13 PROCEDURE — 85025 COMPLETE CBC W/AUTO DIFF WBC: CPT | Mod: ZL

## 2020-11-13 RX ORDER — TRIAMCINOLONE ACETONIDE 5 MG/G
CREAM TOPICAL
Qty: 30 G | Refills: 3 | Status: SHIPPED | OUTPATIENT
Start: 2020-11-13 | End: 2024-01-17

## 2020-11-13 RX ORDER — ACETAMINOPHEN 160 MG
1 TABLET,DISINTEGRATING ORAL DAILY
COMMUNITY
End: 2023-02-28

## 2020-11-13 ASSESSMENT — ENCOUNTER SYMPTOMS
EYE DISCHARGE: 0
MYALGIAS: 0
POLYPHAGIA: 0
PALPITATIONS: 0
DYSPHORIC MOOD: 0
FACIAL SWELLING: 0
NUMBNESS: 0
DYSURIA: 0
UNEXPECTED WEIGHT CHANGE: 0
HEMATOCHEZIA: 0
NAUSEA: 0
VOMITING: 0
HEARTBURN: 0
EYE REDNESS: 0
ARTHRALGIAS: 0
SHORTNESS OF BREATH: 0
SINUS PRESSURE: 0
CHILLS: 0
HEADACHES: 0
ABDOMINAL PAIN: 0
ROS SKIN COMMENTS: SEE HPI
ADENOPATHY: 0
FEVER: 0
DIARRHEA: 0
RHINORRHEA: 0
NERVOUS/ANXIOUS: 0
POLYDIPSIA: 0
CONSTIPATION: 0
COUGH: 0
CONFUSION: 0
SORE THROAT: 0
DIZZINESS: 0
SINUS PAIN: 0
PARESTHESIAS: 0
WHEEZING: 0
FREQUENCY: 0
WOUND: 0

## 2020-11-13 ASSESSMENT — PAIN SCALES - GENERAL: PAINLEVEL: NO PAIN (0)

## 2020-11-13 ASSESSMENT — MIFFLIN-ST. JEOR: SCORE: 1190.96

## 2020-11-13 NOTE — NURSING NOTE
Chief Complaint   Patient presents with     Annual Visit     Derm Problem       Medication Reconciliation complete.   Payal Gardner LPN  ..................11/13/2020   9:25 AM
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
## 2020-12-15 ENCOUNTER — OFFICE VISIT (OUTPATIENT)
Dept: OTOLARYNGOLOGY | Facility: OTHER | Age: 80
End: 2020-12-15
Attending: OTOLARYNGOLOGY
Payer: MEDICARE

## 2020-12-15 DIAGNOSIS — J34.89 OVERDEVELOPMENT OF NASAL BONES: Primary | ICD-10-CM

## 2020-12-15 PROCEDURE — G0463 HOSPITAL OUTPT CLINIC VISIT: HCPCS

## 2020-12-16 ENCOUNTER — TRANSFERRED RECORDS (OUTPATIENT)
Dept: HEALTH INFORMATION MANAGEMENT | Facility: OTHER | Age: 80
End: 2020-12-16

## 2020-12-18 ENCOUNTER — VIRTUAL VISIT (OUTPATIENT)
Dept: FAMILY MEDICINE | Facility: OTHER | Age: 80
End: 2020-12-18
Attending: PHYSICIAN ASSISTANT
Payer: COMMERCIAL

## 2020-12-18 DIAGNOSIS — J01.00 ACUTE NON-RECURRENT MAXILLARY SINUSITIS: Primary | ICD-10-CM

## 2020-12-18 DIAGNOSIS — R09.81 SINUS CONGESTION: ICD-10-CM

## 2020-12-18 PROCEDURE — 99212 OFFICE O/P EST SF 10 MIN: CPT | Mod: 95 | Performed by: PHYSICIAN ASSISTANT

## 2020-12-18 RX ORDER — AZITHROMYCIN 250 MG/1
TABLET, FILM COATED ORAL
Qty: 6 TABLET | Refills: 0 | Status: SHIPPED | OUTPATIENT
Start: 2020-12-18 | End: 2020-12-23

## 2020-12-18 RX ORDER — METHOTREXATE 25 MG/ML
15 INJECTION, SOLUTION INTRA-ARTERIAL; INTRAMUSCULAR; INTRAVENOUS
COMMUNITY
Start: 2020-12-16 | End: 2024-03-20

## 2020-12-18 NOTE — PATIENT INSTRUCTIONS
Sinus infection - Sent azithromycin to the pharmacy for treatment of a sinus infection.     Ordered a COVID-19 test to be completed at a [facility] appointment.     Antibiotic has been sent to pharmacy. Please take full course of antibiotic even if symptoms have completely resolved. This helps prevent against antibiotic resistance.     May use symptomatic care with tylenol or ibuprofen. May use cough syrup or cough drops. Using a humidifier works well to break up the congestion. You can also sleep propped up on a couple pillows to decrease symptoms at night.     Use a Neti Pot/sinusflush (Tyler Med Sinus Rinse) 3 times daily to irrigate sinuses/mucosal tissue.     Sudafed or mucinex work well for congestion.   If you choose pseudoephedrine, use for only 5-7 days AS DIRECTED. Speak to your pharmacist if you have any concerns about your medications. Mayalso use decongestant nasal spray, but only for 3 days MAXIMUM.    Please take tylenol as needed up to 4 times daily.  Treat symptomatically with warm salt water gargles.  Lozenges, Tylenol, Advil or Aleve as needed. Frequent swallows of cool liquid.  Oatmeal coats the throat and some patients find it soothes the pain.     Monitor for any fevers or chills. Return in 7-10 days if not feeling better. Please call clinic with any questions or concerns. Please take in a lot of fluids and get rest.     You will need to be evaluated if you start to experience:  Fever higher than 102.5 F (39.2 C)   Sudden and severe pain in the face and head   Trouble seeing or seeing double   Trouble thinking clearly   Swelling or redness around 1 or both eyes   Trouble breathing or a stiff neck    * If you are a smoker, try to quit *    Call 9-1-1 or go to the emergency room if you:  Have trouble breathing   Are drooling because you cannot swallow your saliva   Have swelling of the neck or tongue   Cannot move your neck or have trouble opening your mouth

## 2020-12-18 NOTE — PROGRESS NOTES
"Verena Wise is a 80 year old female who is being evaluated via a billable telephone visit.      The patient has been notified of following:     \"This telephone visit will be conducted via a call between you and your physician/provider. We have found that certain health care needs can be provided without the need for a physical exam.  This service lets us provide the care you need with a short phone conversation.  If a prescription is necessary we can send it directly to your pharmacy.  If lab work is needed we can place an order for that and you can then stop by our lab to have the test done at a later time.    Telephone visits are billed at different rates depending on your insurance coverage. During this emergency period, for some insurers they may be billed the same as an in-person visit.  Please reach out to your insurance provider with any questions.    If during the course of the call the physician/provider feels a telephone visit is not appropriate, you will not be charged for this service.\"    Patient has given verbal consent for Telephone visit?  Yes    What phone number would you like to be contacted at? [phone]    How would you like to obtain your AVS? [place]    Subjective     Verena Wise is a 80 year old female who presents via phone visit today for the following health issues:    HPI      Patient struggling with a sinus infection.  She has had pain in her cheeks.  History of having sinus infections in the past.  Symptoms started on 12/16.  She tried using a sinus rinse however this did not help.  Not having fevers, chills, change in taste or smell, GI symptoms, cough, wheezing, shortness of breath, ear pain, sore throat.  Keeping food and fluids down.  No dehydration concerns.  No nasal drainage.  No Covid-19 exposures.  With her arthritis she normally has body aches, muscle aches, and fatigue however she feels these are stable as compared to previous.    Review of Systems "   Constitutional, HEENT, cardiovascular, pulmonary, gi and gu systems are negative, except as otherwise noted.       Objective          Vitals:  No vitals were obtained today due to virtual visit.    healthy, alert and no distress  PSYCH: Alert and oriented times 3; coherent speech, normal   rate and volume, able to articulate logical thoughts, able   to abstract reason, no tangential thoughts, no hallucinations   or delusions  Her affect is normal  RESP: No cough, no audible wheezing, able to talk in full sentences  Remainder of exam unable to be completed due to telephone visits            Assessment/Plan:    Assessment & Plan     Verena was seen today for sinus problem.    Diagnoses and all orders for this visit:    Acute non-recurrent maxillary sinusitis  -     azithromycin (ZITHROMAX) 250 MG tablet; Take 2 tablets (500 mg) by mouth daily for 1 day, THEN 1 tablet (250 mg) daily for 4 days.    Sinus congestion  -     Symptomatic COVID-19 Virus (Coronavirus) by PCR; Future  -     azithromycin (ZITHROMAX) 250 MG tablet; Take 2 tablets (500 mg) by mouth daily for 1 day, THEN 1 tablet (250 mg) daily for 4 days.      Started on azithromycin for an acute nonrecurrent maxillary sinus infection.  Gave side effect profile.  Encourage fluids and rest.  Can continue to use over-the-counter cough and cold remedies as needed or sinus rinses.  Order Covid-19 test to rule out infection concerns. Return to clinic with change/worsening of symptoms.              Return if symptoms worsen or fail to improve.     Patient Instructions   Sinus infection - Sent azithromycin to the pharmacy for treatment of a sinus infection.     Ordered a COVID-19 test to be completed at a [facility] appointment.     Antibiotic has been sent to pharmacy. Please take full course of antibiotic even if symptoms have completely resolved. This helps prevent against antibiotic resistance.     May use symptomatic care with tylenol or ibuprofen. May use cough  syrup or cough drops. Using a humidifier works well to break up the congestion. You can also sleep propped up on a couple pillows to decrease symptoms at night.     Use a Neti Pot/sinusflush (Tyler Med Sinus Rinse) 3 times daily to irrigate sinuses/mucosal tissue.     Sudafed or mucinex work well for congestion.   If you choose pseudoephedrine, use for only 5-7 days AS DIRECTED. Speak to your pharmacist if you have any concerns about your medications. Mayalso use decongestant nasal spray, but only for 3 days MAXIMUM.    Please take tylenol as needed up to 4 times daily.  Treat symptomatically with warm salt water gargles.  Lozenges, Tylenol, Advil or Aleve as needed. Frequent swallows of cool liquid.  Oatmeal coats the throat and some patients find it soothes the pain.     Monitor for any fevers or chills. Return in 7-10 days if not feeling better. Please call clinic with any questions or concerns. Please take in a lot of fluids and get rest.     You will need to be evaluated if you start to experience:  Fever higher than 102.5 F (39.2 C)   Sudden and severe pain in the face and head   Trouble seeing or seeing double   Trouble thinking clearly   Swelling or redness around 1 or both eyes   Trouble breathing or a stiff neck    * If you are a smoker, try to quit *    Call 9-1-1 or go to the emergency room if you:  Have trouble breathing   Are drooling because you cannot swallow your saliva   Have swelling of the neck or tongue   Cannot move your neck or have trouble opening your mouth       Zarina Argueta PA-C  [facility]    Phone call duration:  11 minutes

## 2020-12-18 NOTE — PROGRESS NOTES
document embedded image  Patient Name: Verena Wise    Address: [address] [place] [address]     YOB: 1940    [place] [address]    MR Number: [id number]    Phone: [phone]  PCP: Judy Garcia MD            Appointment Date: 12/15/20   Visit Provider: Miguel Angel Larson MD    cc: Judy Garcia MD; ~    ENT Progress Note    Visit Reasons: Swelling on right side of nose    HPI  History of Present Illness  Chief complaint:  Nasal swelling    History  The patient is an 80-year-old female who comes to the office today because she feels she is getting some swelling along the right side of her nose.  She has no pain.  She has no drainage from either eye.  She has no nasal obstruction.  She has had no bloody or unusual nasal secretions.    Exam  Nasal-careful palpation over her nasal dorsum mild asymmetry of her nasal bones but I cannot feel any mass lesion.  Intranasally, there is no evidence of lesion.  Head neck integument-Clear  General-the patient appears well and in no distress  Oral cavity oropharynx-free of lesion  Neck-no masses or adenopathy  Neuro-there are no focal cranial nerve deficits  Review of her recent plane nasal x-rays fails to reveal any obvious mass lesion or inflammation of the sinuses.    Home Medications     - Last Reconciled 12/16/20 by Ellie Abdalla CMA    ascorbic acid (vitamin C)   PO  cholecalciferol (vitamin D3)   PO  diclofenac sodium 1 % topical gel (Voltaren)  2 grams topical QID  flaxseed oil   PO  folic acid 1 mg tablet  2 mg (2 x 1 mg) PO DAILY  methotrexate sodium 25 mg/mL injection solution  25 mg SUBCUT QWEEK  omega-3 fatty acids 1,000 mg capsule  1,000 mg PO DAILY  prednisone 1 mg tablet  Take 1 tablet by mouth once daily  vitamin E mixed   PO    Allergies    codeine Allergy (Unknown, Verified 12/16/20 10:14)  SICK  Sulfa (Sulfonamide Antibiotics) Allergy (Unverified 12/16/20 10:14)  Unknown    PFSH  PFSH:     Medical History (Updated 12/16/20 @ 10:14 by Ellie FLANNERY  FAYE Abdalla)    Diagnosis unknown  Rheumatoid arthritis (+RF and anti-CCP)  Osteopenia  Headache    Surgical History (Reviewed 20 @ 10:14 by Ellie Abdalla CMA)    Diagnosis unknown  R knee torn meniscus 2016  cholecystectomy     Family History (Updated 20 @ 10:15 by Ellie Abdalla CMA)  Father   ,  78 yrs  Heart attack  Arthritis  Mother   ,  85 yrs  Pneumonia  Family/Other  Diagnosis unknown       8 brother(s) , 5 sister(s) .       Brothers have had heart disease.Uncle,sister and aunt have had cancer.A brother has Sjogren's syndrome and lupus.        Other  Headache  Tinnitus       Social History (Reviewed 20 @ 10:15 by Ellie Abdalla CMA)  Smoking Status:  Never smoker       A&P  Assessment & Plan  (1) Overdevelopment, nasal bones:        Status: Acute        Code(s):  J34.89 - Other specified disorders of nose and nasal sinuses  Additional Plan Details  Plan Details: She has very prominent nasal bones bilaterally but I cannot detect any mass lesion or evidence of infection.  Would like to see her back in 6-8 weeks to see if there is any change in her exam.      Miguel Angel Larson MD    12/15/20 2389    <Electronically signed by Miguel Angel Larson MD> [id number]

## 2020-12-19 ENCOUNTER — ALLIED HEALTH/NURSE VISIT (OUTPATIENT)
Dept: FAMILY MEDICINE | Facility: OTHER | Age: 80
End: 2020-12-19
Attending: PHYSICIAN ASSISTANT
Payer: MEDICARE

## 2020-12-19 DIAGNOSIS — R09.81 SINUS CONGESTION: ICD-10-CM

## 2020-12-19 DIAGNOSIS — R09.81 NASAL CONGESTION: Primary | ICD-10-CM

## 2020-12-19 PROCEDURE — U0003 INFECTIOUS AGENT DETECTION BY NUCLEIC ACID (DNA OR RNA); SEVERE ACUTE RESPIRATORY SYNDROME CORONAVIRUS 2 (SARS-COV-2) (CORONAVIRUS DISEASE [COVID-19]), AMPLIFIED PROBE TECHNIQUE, MAKING USE OF HIGH THROUGHPUT TECHNOLOGIES AS DESCRIBED BY CMS-2020-01-R: HCPCS | Mod: ZL | Performed by: PHYSICIAN ASSISTANT

## 2020-12-19 PROCEDURE — C9803 HOPD COVID-19 SPEC COLLECT: HCPCS

## 2020-12-19 NOTE — PROGRESS NOTES
Patient swabbed for COVID-19 testing.  Ashley Apodaca LPN on 12/19/2020 at 8:34 AM    Nasal congestion

## 2020-12-20 LAB
SARS-COV-2 RNA SPEC QL NAA+PROBE: NOT DETECTED
SPECIMEN SOURCE: NORMAL

## 2021-01-12 ENCOUNTER — TRANSFERRED RECORDS (OUTPATIENT)
Dept: HEALTH INFORMATION MANAGEMENT | Facility: OTHER | Age: 81
End: 2021-01-12

## 2021-01-18 ENCOUNTER — HOSPITAL ENCOUNTER (OUTPATIENT)
Dept: MAMMOGRAPHY | Facility: OTHER | Age: 81
Discharge: HOME | End: 2021-01-18
Attending: NURSE PRACTITIONER | Admitting: NURSE PRACTITIONER
Payer: MEDICARE

## 2021-01-18 DIAGNOSIS — Z12.31 ENCOUNTER FOR SCREENING MAMMOGRAM FOR BREAST CANCER: ICD-10-CM

## 2021-01-18 PROCEDURE — 77063 BREAST TOMOSYNTHESIS BI: CPT

## 2021-03-12 ENCOUNTER — APPOINTMENT (OUTPATIENT)
Dept: GENERAL RADIOLOGY | Facility: OTHER | Age: 81
End: 2021-03-12
Attending: STUDENT IN AN ORGANIZED HEALTH CARE EDUCATION/TRAINING PROGRAM
Payer: MEDICARE

## 2021-03-12 ENCOUNTER — APPOINTMENT (OUTPATIENT)
Dept: CT IMAGING | Facility: OTHER | Age: 81
End: 2021-03-12
Attending: STUDENT IN AN ORGANIZED HEALTH CARE EDUCATION/TRAINING PROGRAM
Payer: MEDICARE

## 2021-03-12 ENCOUNTER — APPOINTMENT (OUTPATIENT)
Dept: PHYSICAL THERAPY | Facility: OTHER | Age: 81
End: 2021-03-12
Attending: FAMILY MEDICINE
Payer: MEDICARE

## 2021-03-12 ENCOUNTER — APPOINTMENT (OUTPATIENT)
Dept: MRI IMAGING | Facility: OTHER | Age: 81
End: 2021-03-12
Attending: STUDENT IN AN ORGANIZED HEALTH CARE EDUCATION/TRAINING PROGRAM
Payer: MEDICARE

## 2021-03-12 ENCOUNTER — HOSPITAL ENCOUNTER (OUTPATIENT)
Facility: OTHER | Age: 81
Setting detail: OBSERVATION
Discharge: HOME | End: 2021-03-12
Attending: STUDENT IN AN ORGANIZED HEALTH CARE EDUCATION/TRAINING PROGRAM | Admitting: INTERNAL MEDICINE
Payer: MEDICARE

## 2021-03-12 ENCOUNTER — APPOINTMENT (OUTPATIENT)
Dept: OCCUPATIONAL THERAPY | Facility: OTHER | Age: 81
End: 2021-03-12
Attending: FAMILY MEDICINE
Payer: MEDICARE

## 2021-03-12 VITALS
HEART RATE: 70 BPM | RESPIRATION RATE: 16 BRPM | SYSTOLIC BLOOD PRESSURE: 134 MMHG | OXYGEN SATURATION: 97 % | DIASTOLIC BLOOD PRESSURE: 75 MMHG | BODY MASS INDEX: 27.16 KG/M2 | WEIGHT: 164.02 LBS | TEMPERATURE: 98.2 F

## 2021-03-12 DIAGNOSIS — I63.9 CEREBROVASCULAR ACCIDENT (CVA), UNSPECIFIED MECHANISM (H): ICD-10-CM

## 2021-03-12 DIAGNOSIS — M25.512 LEFT SHOULDER PAIN, UNSPECIFIED CHRONICITY: ICD-10-CM

## 2021-03-12 DIAGNOSIS — Z11.52 ENCOUNTER FOR SCREENING LABORATORY TESTING FOR COVID-19 VIRUS: ICD-10-CM

## 2021-03-12 DIAGNOSIS — R29.701 NATIONAL INSTITUTES OF HEALTH STROKE SCALE (NIHSS) TOTAL SCORE 1: ICD-10-CM

## 2021-03-12 DIAGNOSIS — Z79.899 ENCOUNTER FOR LONG-TERM (CURRENT) USE OF MEDICATIONS: ICD-10-CM

## 2021-03-12 LAB
ANION GAP SERPL CALCULATED.3IONS-SCNC: 8 MMOL/L (ref 3–14)
APTT PPP: 23 SEC (ref 22–37)
BASOPHILS # BLD AUTO: 0.1 10E9/L (ref 0–0.2)
BASOPHILS NFR BLD AUTO: 0.9 %
BUN SERPL-MCNC: 19 MG/DL (ref 7–25)
CALCIUM SERPL-MCNC: 10.1 MG/DL (ref 8.6–10.3)
CHLORIDE SERPL-SCNC: 102 MMOL/L (ref 98–107)
CO2 SERPL-SCNC: 27 MMOL/L (ref 21–31)
CREAT SERPL-MCNC: 1.07 MG/DL (ref 0.6–1.2)
DIFFERENTIAL METHOD BLD: NORMAL
EOSINOPHIL # BLD AUTO: 0.2 10E9/L (ref 0–0.7)
EOSINOPHIL NFR BLD AUTO: 1.7 %
ERYTHROCYTE [DISTWIDTH] IN BLOOD BY AUTOMATED COUNT: 13.4 % (ref 10–15)
GFR SERPL CREATININE-BSD FRML MDRD: 49 ML/MIN/{1.73_M2}
GLUCOSE SERPL-MCNC: 163 MG/DL (ref 70–105)
HCT VFR BLD AUTO: 39.8 % (ref 35–47)
HGB BLD-MCNC: 13.2 G/DL (ref 11.7–15.7)
IMM GRANULOCYTES # BLD: 0 10E9/L (ref 0–0.4)
IMM GRANULOCYTES NFR BLD: 0.3 %
INR PPP: 0.91 (ref 0.86–1.14)
LABORATORY COMMENT REPORT: NORMAL
LYMPHOCYTES # BLD AUTO: 1.1 10E9/L (ref 0.8–5.3)
LYMPHOCYTES NFR BLD AUTO: 12.7 %
MCH RBC QN AUTO: 32.4 PG (ref 26.5–33)
MCHC RBC AUTO-ENTMCNC: 33.2 G/DL (ref 31.5–36.5)
MCV RBC AUTO: 98 FL (ref 78–100)
MONOCYTES # BLD AUTO: 0.7 10E9/L (ref 0–1.3)
MONOCYTES NFR BLD AUTO: 8.5 %
NEUTROPHILS # BLD AUTO: 6.5 10E9/L (ref 1.6–8.3)
NEUTROPHILS NFR BLD AUTO: 75.9 %
PLATELET # BLD AUTO: 271 10E9/L (ref 150–450)
POTASSIUM SERPL-SCNC: 3.5 MMOL/L (ref 3.5–5.1)
RBC # BLD AUTO: 4.08 10E12/L (ref 3.8–5.2)
SARS-COV-2 RNA RESP QL NAA+PROBE: NEGATIVE
SODIUM SERPL-SCNC: 137 MMOL/L (ref 134–144)
SPECIMEN SOURCE: NORMAL
TROPONIN I SERPL-MCNC: 3.2 PG/ML
WBC # BLD AUTO: 8.6 10E9/L (ref 4–11)

## 2021-03-12 PROCEDURE — 70450 CT HEAD/BRAIN W/O DYE: CPT | Mod: TC,XU

## 2021-03-12 PROCEDURE — G0378 HOSPITAL OBSERVATION PER HR: HCPCS

## 2021-03-12 PROCEDURE — 93005 ELECTROCARDIOGRAM TRACING: CPT | Performed by: STUDENT IN AN ORGANIZED HEALTH CARE EDUCATION/TRAINING PROGRAM

## 2021-03-12 PROCEDURE — 250N000011 HC RX IP 250 OP 636: Performed by: STUDENT IN AN ORGANIZED HEALTH CARE EDUCATION/TRAINING PROGRAM

## 2021-03-12 PROCEDURE — 99285 EMERGENCY DEPT VISIT HI MDM: CPT | Mod: 25 | Performed by: STUDENT IN AN ORGANIZED HEALTH CARE EDUCATION/TRAINING PROGRAM

## 2021-03-12 PROCEDURE — 70553 MRI BRAIN STEM W/O & W/DYE: CPT

## 2021-03-12 PROCEDURE — 80048 BASIC METABOLIC PNL TOTAL CA: CPT | Performed by: STUDENT IN AN ORGANIZED HEALTH CARE EDUCATION/TRAINING PROGRAM

## 2021-03-12 PROCEDURE — 255N000002 HC RX 255 OP 636: Performed by: STUDENT IN AN ORGANIZED HEALTH CARE EDUCATION/TRAINING PROGRAM

## 2021-03-12 PROCEDURE — A9575 INJ GADOTERATE MEGLUMI 0.1ML: HCPCS | Performed by: FAMILY MEDICINE

## 2021-03-12 PROCEDURE — 97161 PT EVAL LOW COMPLEX 20 MIN: CPT | Mod: GP

## 2021-03-12 PROCEDURE — C9803 HOPD COVID-19 SPEC COLLECT: HCPCS | Performed by: STUDENT IN AN ORGANIZED HEALTH CARE EDUCATION/TRAINING PROGRAM

## 2021-03-12 PROCEDURE — 84484 ASSAY OF TROPONIN QUANT: CPT | Performed by: STUDENT IN AN ORGANIZED HEALTH CARE EDUCATION/TRAINING PROGRAM

## 2021-03-12 PROCEDURE — U0003 INFECTIOUS AGENT DETECTION BY NUCLEIC ACID (DNA OR RNA); SEVERE ACUTE RESPIRATORY SYNDROME CORONAVIRUS 2 (SARS-COV-2) (CORONAVIRUS DISEASE [COVID-19]), AMPLIFIED PROBE TECHNIQUE, MAKING USE OF HIGH THROUGHPUT TECHNOLOGIES AS DESCRIBED BY CMS-2020-01-R: HCPCS | Performed by: STUDENT IN AN ORGANIZED HEALTH CARE EDUCATION/TRAINING PROGRAM

## 2021-03-12 PROCEDURE — 85730 THROMBOPLASTIN TIME PARTIAL: CPT | Performed by: STUDENT IN AN ORGANIZED HEALTH CARE EDUCATION/TRAINING PROGRAM

## 2021-03-12 PROCEDURE — 70496 CT ANGIOGRAPHY HEAD: CPT | Mod: TC

## 2021-03-12 PROCEDURE — 85610 PROTHROMBIN TIME: CPT | Performed by: STUDENT IN AN ORGANIZED HEALTH CARE EDUCATION/TRAINING PROGRAM

## 2021-03-12 PROCEDURE — 96374 THER/PROPH/DIAG INJ IV PUSH: CPT | Mod: XU | Performed by: STUDENT IN AN ORGANIZED HEALTH CARE EDUCATION/TRAINING PROGRAM

## 2021-03-12 PROCEDURE — 99234 HOSP IP/OBS SM DT SF/LOW 45: CPT | Performed by: FAMILY MEDICINE

## 2021-03-12 PROCEDURE — 85025 COMPLETE CBC W/AUTO DIFF WBC: CPT | Performed by: STUDENT IN AN ORGANIZED HEALTH CARE EDUCATION/TRAINING PROGRAM

## 2021-03-12 PROCEDURE — 97530 THERAPEUTIC ACTIVITIES: CPT | Mod: GP

## 2021-03-12 PROCEDURE — 250N000012 HC RX MED GY IP 250 OP 636 PS 637: Performed by: FAMILY MEDICINE

## 2021-03-12 PROCEDURE — 93010 ELECTROCARDIOGRAM REPORT: CPT | Performed by: INTERNAL MEDICINE

## 2021-03-12 PROCEDURE — 97165 OT EVAL LOW COMPLEX 30 MIN: CPT | Mod: GO | Performed by: OCCUPATIONAL THERAPIST

## 2021-03-12 PROCEDURE — 99285 EMERGENCY DEPT VISIT HI MDM: CPT | Performed by: STUDENT IN AN ORGANIZED HEALTH CARE EDUCATION/TRAINING PROGRAM

## 2021-03-12 PROCEDURE — U0005 INFEC AGEN DETEC AMPLI PROBE: HCPCS | Performed by: STUDENT IN AN ORGANIZED HEALTH CARE EDUCATION/TRAINING PROGRAM

## 2021-03-12 PROCEDURE — 73030 X-RAY EXAM OF SHOULDER: CPT | Mod: LT

## 2021-03-12 PROCEDURE — 250N000013 HC RX MED GY IP 250 OP 250 PS 637: Performed by: FAMILY MEDICINE

## 2021-03-12 PROCEDURE — 97530 THERAPEUTIC ACTIVITIES: CPT | Mod: GO | Performed by: OCCUPATIONAL THERAPIST

## 2021-03-12 PROCEDURE — 36415 COLL VENOUS BLD VENIPUNCTURE: CPT | Performed by: STUDENT IN AN ORGANIZED HEALTH CARE EDUCATION/TRAINING PROGRAM

## 2021-03-12 PROCEDURE — 97116 GAIT TRAINING THERAPY: CPT | Mod: GP

## 2021-03-12 PROCEDURE — 255N000002 HC RX 255 OP 636: Performed by: FAMILY MEDICINE

## 2021-03-12 RX ORDER — ONDANSETRON 2 MG/ML
4 INJECTION INTRAMUSCULAR; INTRAVENOUS ONCE
Status: DISCONTINUED | OUTPATIENT
Start: 2021-03-12 | End: 2021-03-12 | Stop reason: HOSPADM

## 2021-03-12 RX ORDER — KETOROLAC TROMETHAMINE 15 MG/ML
15 INJECTION, SOLUTION INTRAMUSCULAR; INTRAVENOUS ONCE
Status: COMPLETED | OUTPATIENT
Start: 2021-03-12 | End: 2021-03-12

## 2021-03-12 RX ORDER — VITAMIN E 268 MG
400 CAPSULE ORAL DAILY
COMMUNITY
End: 2024-06-27

## 2021-03-12 RX ORDER — ACETAMINOPHEN 325 MG/1
325 TABLET ORAL EVERY 6 HOURS PRN
COMMUNITY

## 2021-03-12 RX ORDER — PREDNISONE 1 MG/1
1 TABLET ORAL DAILY
Status: DISCONTINUED | OUTPATIENT
Start: 2021-03-12 | End: 2021-03-12 | Stop reason: HOSPADM

## 2021-03-12 RX ORDER — ALPRAZOLAM 0.25 MG
0.25 TABLET ORAL ONCE
Status: COMPLETED | OUTPATIENT
Start: 2021-03-12 | End: 2021-03-12

## 2021-03-12 RX ORDER — IODIXANOL 320 MG/ML
100 INJECTION, SOLUTION INTRAVASCULAR ONCE
Status: COMPLETED | OUTPATIENT
Start: 2021-03-12 | End: 2021-03-12

## 2021-03-12 RX ADMIN — PREDNISONE 1 MG: 1 TABLET ORAL at 12:06

## 2021-03-12 RX ADMIN — ALPRAZOLAM 0.25 MG: 0.25 TABLET ORAL at 09:09

## 2021-03-12 RX ADMIN — KETOROLAC TROMETHAMINE 15 MG: 15 INJECTION, SOLUTION INTRAMUSCULAR; INTRAVENOUS at 05:30

## 2021-03-12 RX ADMIN — GADOTERATE MEGLUMINE 15 ML: 376.9 INJECTION INTRAVENOUS at 10:52

## 2021-03-12 RX ADMIN — IODIXANOL 100 ML: 320 INJECTION, SOLUTION INTRAVASCULAR at 04:16

## 2021-03-12 ASSESSMENT — ACTIVITIES OF DAILY LIVING (ADL)
FALL_HISTORY_WITHIN_LAST_SIX_MONTHS: YES
DIFFICULTY_COMMUNICATING: NO
NUMBER_OF_TIMES_PATIENT_HAS_FALLEN_WITHIN_LAST_SIX_MONTHS: 1
DOING_ERRANDS_INDEPENDENTLY_DIFFICULTY: NO
WEAR_GLASSES_OR_BLIND: NO
CONCENTRATING,_REMEMBERING_OR_MAKING_DECISIONS_DIFFICULTY: NO
DRESSING/BATHING_DIFFICULTY: NO
ADLS_ACUITY_SCORE: 14
HEARING_DIFFICULTY_OR_DEAF: NO
DIFFICULTY_EATING/SWALLOWING: NO
ADLS_ACUITY_SCORE: 14
WALKING_OR_CLIMBING_STAIRS_DIFFICULTY: NO
PATIENT_/_FAMILY_COMMUNICATION_STYLE: SPOKEN LANGUAGE (ENGLISH OR BILINGUAL)
TOILETING_ISSUES: NO

## 2021-03-12 ASSESSMENT — ENCOUNTER SYMPTOMS
CHILLS: 0
DIARRHEA: 0
COUGH: 0
TROUBLE SWALLOWING: 0
FEVER: 0
CONSTIPATION: 0
ABDOMINAL PAIN: 0
SHORTNESS OF BREATH: 0
RHINORRHEA: 0
NAUSEA: 0
VOMITING: 0
NUMBNESS: 0
CONFUSION: 0
SORE THROAT: 0
SPEECH DIFFICULTY: 0
DIZZINESS: 0

## 2021-03-12 ASSESSMENT — VISUAL ACUITY: OU: BASELINE

## 2021-03-12 NOTE — PROGRESS NOTES
03/12/21 1400   Quick Adds   Type of Visit Initial PT Evaluation   Living Environment   People in home spouse   Current Living Arrangements house   Home Accessibility stairs to enter home;stairs within home   Number of Stairs, Main Entrance 4   Stair Railings, Main Entrance railings safe and in good condition   Number of Stairs, Within Home, Primary 7   Stair Railings, Within Home, Primary railings on both sides of stairs   Transportation Anticipated family or friend will provide   Self-Care   Usual Activity Tolerance good   Current Activity Tolerance good   Equipment Currently Used at Home none   Disability/Function   Hearing Difficulty or Deaf no   Wear Glasses or Blind no   Concentrating, Remembering or Making Decisions Difficulty no   Difficulty Communicating no   Difficulty Eating/Swallowing no   Walking or Climbing Stairs Difficulty no   Dressing/Bathing Difficulty no   Toileting issues no   Doing Errands Independently Difficulty (such as shopping) no   Fall history within last six months yes   Number of times patient has fallen within last six months 1   General Information   Onset of Illness/Injury or Date of Surgery 03/11/21   Referring Physician Dr. Post   Patient/Family Therapy Goals Statement (PT) Patient would like to go back home.   General Observations Patient is very pleasant 80-year-old female who presents to therapy following what appeared to be strokelike symptoms in the middle of the night.  Patient was found by her  on the floor and EMS believed patient had a left-sided facial droop.  Patient is very independent prior to this.  Does have some left shoulder pain that is chronic at this point no fracture seen with x-ray.   Cognition   Orientation Status (Cognition) oriented x 4   Affect/Mental Status (Cognition) WFL   Follows Commands (Cognition) WFL   Cognitive Status Comments Patient appears clear oriented to situation.  Communicates effectively.   Pain Assessment   Patient  Currently in Pain Yes, see Vital Sign flowsheet   Posture    Posture Comments Patient also with left upper extremity close to body   Range of Motion (ROM)   ROM Comment Limited with left upper extremity appears to be chronic condition.  Within functional limits otherwise in all other extremities.   Strength   Strength Comments Patient demonstrates fair symmetrical  strength as well as good upper extremity strength on the right.  Patient able to ambulate as well as March and complete all transfers without assistance demonstrating good lower extremity strength.   Bed Mobility   Bed Mobility supine-sit;sit-supine   Supine-Sit [place] (Bed Mobility) supervision   Sit-Supine [place] (Bed Mobility) supervision   Impairments Contributing to Impaired Bed Mobility decreased ROM   Comment (Bed Mobility) Patient was standby assist for bed mobility able to get out of bed without physical assist from PT or OT.   Transfers   Transfer Safety Comments No concerns noted with sit to stand transfers.   Gait/Stairs (Locomotion)   Distance in Feet (Required for LE Total Joints) 200 feet   Pattern (Gait) swing-through   Maintains Weight-bearing Status (Gait) able to maintain   Comment (Gait/Stairs) Patient has no compensation strategies during ambulation.  At one point even demonstrating tandem heel toe walking without loss of balance.   Balance   Balance Comments Good balance with static and dynamic tasks.   Sensory Examination   Sensory Perception Comments No sensation loss in any extremity.   Clinical Impression   Criteria for Skilled Therapeutic Intervention evaluation only   PT Diagnosis (PT) CVA   Influenced by the following impairments Pain   Functional limitations due to impairments Limited overhead reach likely secondary to chronic pain   Clinical Presentation Stable/Uncomplicated   Clinical Decision Making (Complexity) low complexity   Therapy Frequency (PT) Evaluation only   Clinical Impression Comments Patient  is very close to being back to baseline.  Patient and her  admit that she is doing much better than this morning.  All symptoms have seemed to resolve as far as any left-sided facial droop, weakness, sensations, and the only remaining symptom that she can identify is a now constant left shoulder pain that at previous was intermittent.   PT Discharge Planning    PT Discharge Recommendation (DC Rec) home with assist   PT Rationale for DC Rec Patient is likely nearing baseline functional status.  Home with assist so her  can help her with some tasks until she fully feels 100%.   PT Brief overview of current status  Patient able to complete all mobilities with standby assist.  Transferring without loss of balance and ambulating in the hallway without any gait compensations or weakness.  No fatigue noted either.  Patient seems to be clear most of her symptoms from therapy standpoint   Total Evaluation Time   Total Evaluation Time (Minutes) 15

## 2021-03-12 NOTE — CONSULTS
[facility]    Stroke Telephone Note    I was called by   on 03/12/21 at 0346 regarding patient Verena Wise. The patient is a 80 year old female with past medical history of Sjogren's, and rheumatoid arthritis who presented with left facial droop.  Reported patient had went to bed around 2130 on 3/11/21 and was normal.  Her  found her around 0130 this morning with decreased level of responsiveness and complaining of left shoulder pain.  He also noted a left sided facial droop.  EMS was called and brought her to the ED.  They had noted some dysarthria and confusion on their assessment in addition to the facial droop.  In the ED she had mild to moderate left facial droop but is awake and alert, no aphasia or dysarthria noted.  Reportedly the rest of her neurologic examination is intact.    Stroke Code Data  (for stroke code without tele)  Stroke code activated 03/12/21   0341   First stroke provider response  03/12/21    0343   Last known normal 03/11/21 2130   Time of discovery   (or onset of symptoms) 03/12/21   0130   Head CT read by me 03/12/21   0411   Was stroke code de-escalated? Yes 03/12/21            TPA Treatment   Not given due to minor/isolated/quickly resolving symptoms and unclear or unfavorable risk-benefit profile for extended window thrombolysis beyond the conventional 4.5 hour time window.    Endovascular Treatment  Not initiated due to absence of proximal vessel occlusion    Impression  Left facial droop concerning for ischemic stroke    Recommendations  - Recommend MRI Brain without and with IV contrast  - If positive proceed with full stroke work up - LDL, HgbA1c, TTE, Speech, etc.  - Page stroke doctor on call with further questions    My recommendations are based on the information provided over the phone by Verena Wise's in-person providers. They are not intended to replace the clinical judgment of her in-person providers. I was not requested to  "personally see or examine the patient at this time.    Herlinda Figueroa MD   Neurocritical Care  To page me or covering stroke neurology team member, click here: [facility]   Choose \"On Call\" tab at top, then search dropdown box for \"Neurology Adult\", select location, press Enter, then look for stroke/neuro ICU/telestroke.           "
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
## 2021-03-12 NOTE — PROGRESS NOTES
03/12/21 1451   Quick Adds   Type of Visit Initial Occupational Therapy Evaluation   Living Environment   People in home spouse   Current Living Arrangements house   Self-Care   Usual Activity Tolerance good   Current Activity Tolerance good   Disability/Function   Hearing Difficulty or Deaf no   Cognitive Status Examination   Orientation Status orientation to person, place and time   Affect/Mental Status (Cognitive) WFL   Follows Commands WFL   Visual Perception   Visual Impairment/Limitations WFL   Sensory   Sensory Quick Adds No deficits were identified   Pain Assessment   Patient Currently in Pain No   Range of Motion Comprehensive   General Range of Motion no range of motion deficits identified   Strength Comprehensive (MMT)   General Manual Muscle Testing (MMT) Assessment no strength deficits identified   Coordination   Upper Extremity Coordination No deficits were identified   Bed Mobility   Bed Mobility supine-sit   Supine-Sit [place] (Bed Mobility) independent   Transfers   Transfers sit-stand transfer   Transfer Skill: Bed to Chair/Chair to Bed   Bed-Chair [place] (Transfers) independent   Sit-Stand Transfer   Sit-Stand [place] (Transfers) independent   Clinical Impression   Criteria for Skilled Therapeutic Interventions Met (OT) no   OT Diagnosis CVA   Planned Therapy Interventions (OT)   (eval only)   Clinical Decision Making Complexity (OT) low complexity   Therapy Frequency (OT) 1x eval and treat   Predicted Duration of Therapy   (eval only)   Anticipated Equipment Needs Upon Discharge (OT)   (none)   Risk & Benefits of therapy have been explained risks/benefits reviewed   OT Discharge Planning    OT Discharge Recommendation (DC Rec) home   OT Rationale for DC Rec pt has no OT needs, all symptoms have resolved   OT Brief overview of current status  Pt reports feeling much better, all initial symptoms have resolved, is moving around room and hallway independently, no concerns.   present and supportive    Total Evaluation Time (Minutes)   Total Evaluation Time (Minutes) 15

## 2021-03-12 NOTE — PHARMACY-ADMISSION MEDICATION HISTORY
Pharmacy -- Admission Medication Reconciliation    Prior to admission (PTA) medications were reviewed and the patient's PTA medication list was updated.    Sources Consulted: patient interview, chart review, Navid Cooper    The reliability of this Medication Reconciliation is: Reliability: Reliable    The following significant changes were made:  -  UPDATED last known medication administration times  -  UPDATED ascorbic acid directions  -  UPDATED calcium directions  -  UPDATED flaxseed oil directions  -  UPDATED folic acid directions  -  UPDATED probiotic directions  -  UPDATED methotrexate directions  -  UPDATED fish oil directions  -  REMOVED metronidazole cream (patient never picked up)  -  ADDED Tylenol (patient does not know what strength of tablets she has at home)  -  ADDED vitamin E    Please note:  -  Patient is interested in different ways to manage her should pain, but wants to avoid pain medications (especially opiates) if she can due to a history of dizziness/disorientation/falls due to pain medications  -  Patient DOES NOT take any aspirin and has not for several years.    In addition, the patient's allergies were reviewed with the patient and updated as follows:   Allergies: Codeine, Excedrin back & [acetaminophen-aspirin buffered], Augmentin, and Sulfa drugs (Patient has tolerated doses of acetaminophen recently)    The pharmacist has reviewed with the patient that all personal medications should be removed from the building or locked in the belongings safe.  Patient shall only take medications ordered by the physician and administered by the nursing staff.     Medication barriers identified: prone to sedating side effects of medications with history of falls   Medication adherence concerns: Patient appears to be adherent to prescription medications (folic acid, methotrexate, prednisone) but occasionally misses doses of OTC supplements (~1-2 missed doses per week)   Understanding of  emergency medications: N/A    Scar Mckeon, Pharmacy Intern, 3/12/2021,  11:57 AM

## 2021-03-12 NOTE — PHARMACY - DISCHARGE MEDICATION RECONCILIATION AND EDUCATION
Pharmacy:  Discharge Counseling and Medication Reconciliation    Verena Wise  [address]   [place] [address] [phone] (home)   80 year old female  PCP: Kaia Quinn    Allergies: Codeine, Excedrin back & [acetaminophen-aspirin buffered], Augmentin, and Sulfa drugs    Discharge Counseling:    No new medications. No changes to medications. Pharmacist DID NOT MEET  with patient today to review the medication portion of the After Visit Summary.    Discharge Medication Reconciliation:    It has been determined that the patient has an adequate supply of medications available or which can be obtained from the patient's preferred pharmacy, which she has confirmed as: [facility].     Thank you for the consult.    Claudia Brown [facility]........March 12, 2021 1:52 PM

## 2021-03-12 NOTE — DISCHARGE SUMMARY
[facility]  Hospitalist Discharge Summary      Date of Admission:  3/12/2021  Date of Discharge:  3/12/2021  2:00 PM  Discharging Provider: Carlita Post DO      Discharge Diagnoses   TIA  Chronic Left Shoulder Pain  Rheumatoid Arthritis    Follow-ups Needed After Discharge   Follow-up Appointments     Follow-up and recommended labs and tests       Follow up with primary care provider, Kaia Quinn, within 7   days for hospital follow- up.  Consider lipid panel.             Discharge Disposition   Discharged to home  Condition at discharge: Stable    Hospital Course   Verena Wise is an 80 year old female with past medical history of rheumatoid arthritis and fibromyalgia who presented to the emergency department with confusion, decreased level of consciousness, and left-sided facial droop.  Her last known normal was at 9:30 PM.  Her confusion and decreased consciousness improved prior to arrival in emergency department though left-sided facial droop was persistent.  Head CT And Head/Neck CTA were without abnormalities.  TPA was not administered, as she was outside the appropriate window.  She had significantly improved by the following day, and MRI revealed chronic lacunar injuries of left thalamocapsular junction and right cerebellum and moderate chronic microvascular ischemic changes but no acute findings.  Discussed risks and benefits of statin therapy, indication for use of statin medications, and population of patients in which these medications have been studied.  She declined starting a statin medication and would like to further discuss outpatient with her PCP.     Chronic Left Shoulder Pain:  XR significant for mild to moderate degenerative changes within the AC joint and findings consistent with mild ossific tendinitis.  She was referred to physical therapy and orthopedics on discharge.    Rheumatoid Arthritis:  Home medications continued without  adjustment.    Consultations This Hospital Stay   PHYSICAL THERAPY ADULT IP CONSULT  OCCUPATIONAL THERAPY ADULT IP CONSULT    Time Spent on this Encounter   I, Carlita Post DO, personally saw the patient today and spent greater than 30 minutes discharging this patient.       Carlita Post DO  [facility]  [address]  [place] [address]  Phone: [phone]  Fax: [phone]  ______________________________________________________________________    Physical Exam   Vital Signs: Temp: 98.2  F (36.8  C) Temp src: Tympanic BP: 134/75 Pulse: 70   Resp: 16 SpO2: 97 % O2 Device: None (Room air)    Weight: 164 lbs .36 oz  General Appearance: Alert. No acute distress.  Respiratory: Normal rate and effort. CTAB.  Cardiovascular: RRR. No M/G/R.  GI: Soft.  Neuro: Mild left-sided facial droop and diminished  strength on left compared with right.  Psych: Normal mood and affect.       Primary Care Physician   Kaia Quinn    Discharge Orders      PHYSICAL THERAPY REFERRAL      Orthopedic & Spine  Referral      Reason for your hospital stay    TIA     Follow-up and recommended labs and tests     Follow up with primary care provider, Kaia Quinn, within 7 days for hospital follow- up.  Consider lipid panel.     Activity    Your activity upon discharge: activity as tolerated     Diet    Follow this diet upon discharge: Regular Diet     Significant Results and Procedures   Results for orders placed or performed during the hospital encounter of 03/12/21   CT Head w/o Contrast    Addendum: 3/12/2021    Addendum created by Wilton Villegas MD on 3/12/2021 4:30:03 AM CST:  THIS REPORT CONTAINS FINDINGS THAT MAY BE CRITICAL TO PATIENT CARE. The   findings were verbally communicated via telephone conference with DANIEL SHANKAR at 4:29 AM CST on 3/12/2021. The findings were   acknowledged and understood.        Narrative    Initial report created on  3/12/2021 4:20:27 AM CST:    PROCEDURE INFORMATION:   Exam: CT Head Without Contrast   Exam date and time: 3/12/2021 4:00 AM   Age: 80 years old   Clinical indication: Weakness, facial; Patient HX: Patient found  on the   floor at 0130, patient had decreased level of consciousness and increased   confusion. Patient was also clammy. C/O left shoulder pain x18 years. Patient   has left sided facial droop, no weaknes on one side of body. ; Additional info:   Code stroke to evaluate for potential thrombolysis and thrombectomy. Please   read immediately.     TECHNIQUE:   Imaging protocol: Computed tomography of the head without contrast.   Radiation optimization: All CT scans at this facility use at least one of these   dose optimization techniques: automated exposure control; mA and/or kV   adjustment per patient size (includes targeted exams where dose is matched to   clinical indication); or iterative reconstruction.   Other technique: STROKE PROTOCOL was implemented.     COMPARISON:   No relevant prior studies available.     FINDINGS:   Brain: There is moderate diffuse heterogeneity of the white matter attenuation,   consistent with chronic white matter ischemic changes. No acute infarction. No   acute hemorrhage.   Cerebral ventricles: No ventriculomegaly.   Bones/joints: Unremarkable. No acute fracture.   Paranasal sinuses: Visualized sinuses are unremarkable. No fluid levels.   Mastoid air cells: Visualized mastoid air cells are well aerated.   Soft tissues: Unremarkable.       Impression    IMPRESSION:   No acute intracranial abnormality.     ASSESSMENT:   ASPECTS (Alberta Stroke Program Early CT Score) is 10.     THIS DOCUMENT HAS BEEN ELECTRONICALLY SIGNED BY WILTON VILLEGAS MD   CTA Head Neck with Contrast    Addendum: 3/12/2021    Addendum created by Wilton Villegas MD on 3/12/2021 4:30:41 AM CST:  THIS REPORT CONTAINS FINDINGS THAT MAY BE CRITICAL TO PATIENT CARE. The   findings were verbally communicated  via telephone conference with DANIEL SHANKAR at 4:30 AM CST on 3/12/2021. The findings were   acknowledged and understood.        Narrative    Initial report created on 3/12/2021 4:30:30 AM CST:    PROCEDURE INFORMATION:   Exam: CT Angiography Head With Contrast   Exam date and time: 3/12/2021 4:00 AM   Age: 80 years old   Clinical indication: Weakness; Patient HX: Patient found  on the floor   at 0130, patient had decreased level of consciousness and increased confusion.   Patient was also clammy. C/O left shoulder pain x18 years. Patient has left   sided facial droop, no weaknes on one side of body. ; Additional info: Code   stroke to evaluate for potential thrombolysis and thrombectomy. Please read   immediately.     TECHNIQUE:   Imaging protocol: Computed tomography angiography of the head with intravenous   contrast.   3D rendering (Not supervised by radiologist): MIP and/or 3D reconstructed   images were created by the technologist.   Radiation optimization: All CT scans at this facility use at least one of these   dose optimization techniques: automated exposure control; mA and/or kV   adjustment per patient size (includes targeted exams where dose is matched to   clinical indication); or iterative reconstruction.   Contrast material: VISIPAQUE 320; Contrast volume: 100 ml; Contrast route:   INTRAVENOUS (IV);      COMPARISON:   No relevant prior studies available.     FINDINGS:     ANTERIOR CIRCULATION:   Right internal carotid artery: Unremarkable. Intracranial segment is patent   with no significant stenosis. No aneurysm.   Right middle cerebral artery: Unremarkable. No occlusion or significant   stenosis. No aneurysm.    Right anterior cerebral artery: Unremarkable. No occlusion or significant   stenosis. No aneurysm.      Left internal carotid artery: Unremarkable. Intracranial segment is patent with   no significant stenosis. No aneurysm.   Left middle cerebral artery:  Unremarkable. No occlusion or significant   stenosis. No aneurysm.    Left anterior cerebral artery: Unremarkable. No occlusion or significant   stenosis. No aneurysm.      POSTERIOR CIRCULATION:   Right vertebral artery: Unremarkable. No occlusion or significant stenosis. No   aneurysm.    Left vertebral artery: Unremarkable. No occlusion or significant stenosis. No   aneurysm.    Basilar artery: Unremarkable. No occlusion or significant stenosis. No   aneurysm.   Right posterior cerebral artery: Unremarkable. No occlusion or significant   stenosis. No aneurysm.    Left posterior cerebral artery: Unremarkable. No occlusion or significant   stenosis. No aneurysm.      Brain: No definite mass, mass effect, or midline shift.   Cerebral ventricles: No ventriculomegaly.     Bones/joints: Unremarkable. No acute fracture.   Soft tissues: Unremarkable.       Impression    IMPRESSION:   No large vessel stenosis or occlusion.       =========================  PROCEDURE INFORMATION:   Exam: CT Angiography Neck With Contrast   Exam date and time: 3/12/2021 4:00 AM   Age: 80 years old   Clinical indication: Weakness; Patient HX: Patient found  on the floor   at 0130, patient had decreased level of consciousness and increased confusion.   Patient was also clammy. C/O left shoulder pain x18 years. Patient has left   sided facial droop, no weaknes on one side of body. ; Additional info: Code   stroke to evaluate for potential thrombolysis and thrombectomy. Please read   immediately.     TECHNIQUE:   Imaging protocol: Computed tomography angiography of the neck with intravenous   contrast.   3D rendering (Not supervised by radiologist): MIP and/or 3D reconstructed   images were created by the technologist.   Radiation optimization: All CT scans at this facility use at least one of these   dose optimization techniques: automated exposure control; mA and/or kV   adjustment per patient size (includes targeted exams where dose is  matched to   clinical indication); or iterative reconstruction.   Contrast material: VISIPAQUE 320; Contrast volume: 100 ml; Contrast route:   INTRAVENOUS (IV);      COMPARISON:   No relevant prior studies available.     FINDINGS:   Right common carotid artery: No stenosis. No dissection or occlusion.   Right internal carotid artery: No stenosis of the extracranial segment. No   dissection or occlusion.   Right external carotid artery: No occlusion or stenosis of the origin.    Right vertebral artery: No stenosis. No dissection or occlusion.     Left common carotid artery: No stenosis. No dissection or occlusion.   Left internal carotid artery: No stenosis of the extracranial segment. No   dissection or occlusion.   Left external carotid artery: No occlusion or stenosis of the origin.    Left vertebral artery: No stenosis. No dissection or occlusion.     Thyroid: Small bilateral subcentimeter thyroid nodules too small to warrant   further characterization.   Bones/joints: The cervical spine demonstrates moderate degenerative changes at   multiple levels.   Soft tissues: Normal. No significant soft tissue swelling.     IMPRESSION:   No high-grade stenosis or occlusion.     COMMENTS:   Consistent with the American College of Radiology's Incidental Findings   Committee white paper (J Am Peg Radiol 2015): In patients aged 35 years and   older with an incidental thyroid nodule equal to or greater than 1.5 cm   detected on CT, MRI or extrathyroidal US, further evaluation with dedicated   thyroid US is recommended for patients with normal life expectancy and without   comorbidities. For smaller nodules without suspicious features, no further   evaluation or follow up is recommended.     REFERENCES:   NASCET CRITERIA. The degree of internal carotid artery stenosis is based on   NASCET criteria. Normal is no stenosis. Mild is less than 50% stenosis.   Moderate is 50-69% stenosis. Severe is 70% to 99% stenosis. Total  occlusion is   no detectable patent lumen.     THIS DOCUMENT HAS BEEN ELECTRONICALLY SIGNED BY BRENDA MILLIGAN MD   XR Shoulder Left Port G/E 2 Views    Narrative    PROCEDURE: XR SHOULDER LT PORT G/E 2 VW 3/12/2021 4:32 AM    HISTORY: left shoulder pain, ? fall    COMPARISONS: None.    TECHNIQUE: 2 views.    FINDINGS: No acute fracture or dislocation is seen. Acromiohumeral  distance is normal.    Mild to moderate degenerative change is seen in the AC joint.    Punctate calcification adjacent to the greater tuberosity is  consistent with mild ossific tendinitis.         Impression    IMPRESSION: No acute fracture.    SARA JAMES MD   MR Brain w/o & w Contrast    Narrative    EXAM:  MR BRAIN W/O & W CONTRAST    HISTORY:  Stroke, follow up. .    TECHNIQUE:  Sagittal T1, axial T1, T2, FLAIR, diffusion, gradient as  well as 3D postcontrast imaging of the whole brain was performed.    MEDS/CONTRAST: 15ml dotarem    COMPARISON:  CT head 3/12/2021     FINDINGS:    Prominence of the ventricles and sulci is compatible with mild,  generalized volume loss. No abnormal extra-axial collection,  hydrocephalus, mass effect or midline shift is seen. The basal  cisterns are preserved.    A chronic lacunar injury is present in the right cerebellum. Another  chronic lacunar injury is present at the left thalamocapsular  junction. Patchy T2/FLAIR hyperintense signal within the  supratentorial white matter is most compatible with moderate chronic  microvascular ischemic change. No abnormal intracranial enhancement or  concerning T2* gradient susceptibility is identified. No restricted  diffusion is present. The major intracranial vascular flow voids are  preserved.    The T1 marrow signal is unremarkable.       Impression    IMPRESSION: Chronic lacunar injuries of the left thalamocapsular  junction and right cerebellum. Moderate chronic microvascular ischemic  changes. No acute ischemia.    ENRRIQUE LEMOS MD     Discharge  Medications   Discharge Medication List as of 3/12/2021  1:45 PM      CONTINUE these medications which have NOT CHANGED    Details   acetaminophen (TYLENOL) 325 MG tablet Take 325 mg by mouth every 6 hours as needed for mild pain UNKNOWN DOSE, Historical      ascorbic acid (VITAMIN C) 1000 MG TABS Take 1,000 mg by mouth daily , Disp-30 tablet, Historical      CALCIUM CARBONATE-VIT D-MIN PO Take 1 capsule by mouth 2 times daily , Historical      Cholecalciferol (VITAMIN D3) 50 MCG (2000 UT) CAPS Take 1 capsule by mouth daily, Historical      cycloSPORINE (RESTASIS) 0.05 % ophthalmic emulsion Place 1 drop into both eyes 2 times daily , Historical      Flaxseed Oil OIL Take 1 capsule by mouth daily , Historical      folic acid (FOLVITE) 1 MG tablet Take 2 mg by mouth daily , Historical      Ginger, Zingiber officinalis, (GINGER EXTRACT) 250 MG CAPS Take 1 capsule by mouth daily, Disp-120 capsule, Historical      Lactobacillus (PROBIOTIC ACIDOPHILUS PO) Take 1 capsule by mouth as needed , Historical      methotrexate 50 MG/2ML injection Inject 15 mg Subcutaneous every 7 days Takes on Fridays, Historical      Omega-3 Fatty Acids (SALMON OIL-1000 PO) Take 1 capsule by mouth 2 times daily , Historical      predniSONE (DELTASONE) 1 MG tablet Take 1 mg by mouth daily with food, Historical      triamcinolone (ARISTOCORT HP) 0.5 % external cream Apply sparingly to affected area two times daily.Disp-30 g, [id number]      Turmeric Curcumin 500 MG CAPS Take 500 mg by mouth daily , Historical      vitamin E (TOCOPHEROL) 400 units (180 mg) capsule Take 400 Units by mouth daily, Historical      VOLTAREN 1 % GEL topical gel Apply to joints as needed., BABAR, Historical           Allergies   Allergies   Allergen Reactions     Codeine Nausea     Other reaction(s): Dizziness  Other reaction(s): Dizziness     Excedrin Back & [Acetaminophen-Aspirin Buffered]      Disorientation     Augmentin      Due to interaction from other  medications.     Sulfa Drugs      Interacts with her medication
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
## 2021-03-12 NOTE — ED TRIAGE NOTES
Patient arrived via EMS, per verbal report bpatient was having stroke like symptoms. Patient LKW was 2130. Patient found  on the floor at 0130, patient had decreased level of consciousness and increased confusion. Patient was also clammy. C/o left shoulder pain x18 years. Patient has left sided facial droop, no weaknes on one side of body.

## 2021-03-12 NOTE — PLAN OF CARE
No neurological deficits noted, left shoulder pain, ice effective to reduce pain, alert and oriented, ambulatory, adequate appetite/intake/output, VSS.    Roxanne Harris RN on 3/12/2021 at 10:48 AM

## 2021-03-12 NOTE — PROGRESS NOTES
Discharge Note      Data:  Verena Wise discharged to home at 1400 via wheel chair. Accompanied by spouse and staff.    Action:  Written discharge/follow-up instructions were provided to patient. Prescriptions - None ordered for discharge. All belongings sent with patient.    Response:  Patient verbalized understanding of discharge instructions, reason for discharge, and necessary follow-up appointments.    Roxanne Harris RN on 3/12/2021 at 2:12 PM

## 2021-03-12 NOTE — PLAN OF CARE
Occupational Therapy Discharge Summary    Reason for therapy discharge:    Discharged to home.    Progress towards therapy goal(s). See goals on Care Plan in [facility] electronic health record for goal details.  Goals met    Therapy recommendation(s):    No further therapy is recommended.

## 2021-03-15 ENCOUNTER — PATIENT OUTREACH (OUTPATIENT)
Dept: CARE COORDINATION | Facility: CLINIC | Age: 81
End: 2021-03-15

## 2021-03-15 LAB — INTERPRETATION ECG - MUSE: NORMAL

## 2021-03-15 NOTE — PROGRESS NOTES
Transitional Care Management Phone Call    Summary of hospitalization:  [facility] discharge summary reviewed    DISCHARGE DIAGNOSIS: TIA    DATE OF DISCHARGE: 03-    Diagnostic Tests/Treatments reviewed.  Follow up needed: Physical therapy    Post Discharge Medication Reconciliation: discharge medications reconciled, continue medications without change.    Medications reviewed by: by myself    Problems taking medications regularly:  None    Problems adhering to non-medication therapy:  None    Other Healthcare Providers Involved in Patient s Care:         Physical Therapy     Update since discharge: improved. States she is just tired. No problems with daily activities. No difficulty with speech. Shoulder pain better today. Patient wonders if the severe pain she felt in shoulder was related to TIA and what to watch for in future. Will discuss further at follow-up visit.     Plan of care communicated with patient    Just a friendly reminder that you appointment is     Next 5 appointments (look out 90 days)    Mar 22, 2021  8:00 AM  Office Visit with Kaia Quinn NP  [facility] ([facility] ) [address]  [place] [id number] [phone]      .  We encourage you to keep this appointment.    Please remember to bring all of your pills in their bottles (including any vitamins or over the counter pills) with you to your appointment.   The patient indicates understanding of these issues and agrees with the plan of care.   Yes, plans to follow plan of care and keep the scheduled appointment.    Was the patient contacted within the 2 business days or other approved timeframe?  Yes     Was the Medication reconciliation and management done since the patient was discharged? Yes    Milana Burris RN  3/15/2021 11:48 AM

## 2021-03-22 ENCOUNTER — OFFICE VISIT (OUTPATIENT)
Dept: INTERNAL MEDICINE | Facility: OTHER | Age: 81
End: 2021-03-22
Attending: NURSE PRACTITIONER
Payer: COMMERCIAL

## 2021-03-22 VITALS
OXYGEN SATURATION: 97 % | WEIGHT: 159.8 LBS | SYSTOLIC BLOOD PRESSURE: 126 MMHG | BODY MASS INDEX: 26.46 KG/M2 | TEMPERATURE: 97.6 F | HEART RATE: 80 BPM | DIASTOLIC BLOOD PRESSURE: 72 MMHG | RESPIRATION RATE: 18 BRPM

## 2021-03-22 DIAGNOSIS — G45.9 TIA (TRANSIENT ISCHEMIC ATTACK): Primary | ICD-10-CM

## 2021-03-22 DIAGNOSIS — M05.79 SEROPOSITIVE RHEUMATOID ARTHRITIS OF MULTIPLE SITES (H): ICD-10-CM

## 2021-03-22 DIAGNOSIS — N18.31 STAGE 3A CHRONIC KIDNEY DISEASE (H): ICD-10-CM

## 2021-03-22 DIAGNOSIS — I63.9 CEREBROVASCULAR ACCIDENT (CVA), UNSPECIFIED MECHANISM (H): ICD-10-CM

## 2021-03-22 PROBLEM — N18.30 CHRONIC KIDNEY DISEASE, STAGE 3 (H): Status: ACTIVE | Noted: 2021-03-22

## 2021-03-22 PROCEDURE — 99214 OFFICE O/P EST MOD 30 MIN: CPT | Performed by: NURSE PRACTITIONER

## 2021-03-22 PROCEDURE — G0463 HOSPITAL OUTPT CLINIC VISIT: HCPCS

## 2021-03-22 RX ORDER — ATORVASTATIN CALCIUM 10 MG/1
10 TABLET, FILM COATED ORAL DAILY
Qty: 90 TABLET | Refills: 3 | Status: SHIPPED | OUTPATIENT
Start: 2021-03-22 | End: 2021-04-20

## 2021-03-22 ASSESSMENT — ENCOUNTER SYMPTOMS
SPEECH DIFFICULTY: 0
DIAPHORESIS: 0
APPETITE CHANGE: 0
SHORTNESS OF BREATH: 0
CONFUSION: 0
ACTIVITY CHANGE: 0
SEIZURES: 0
FATIGUE: 0
TROUBLE SWALLOWING: 0
PALPITATIONS: 0
DIZZINESS: 0
FACIAL ASYMMETRY: 1
CHEST TIGHTNESS: 0
WEAKNESS: 0
DECREASED CONCENTRATION: 0
LIGHT-HEADEDNESS: 0
UNEXPECTED WEIGHT CHANGE: 0

## 2021-03-22 ASSESSMENT — PAIN SCALES - GENERAL: PAINLEVEL: MILD PAIN (2)
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
## 2021-03-26 DIAGNOSIS — G45.9 TIA (TRANSIENT ISCHEMIC ATTACK): ICD-10-CM

## 2021-03-26 DIAGNOSIS — I63.9 CEREBROVASCULAR ACCIDENT (CVA), UNSPECIFIED MECHANISM (H): ICD-10-CM

## 2021-03-26 DIAGNOSIS — M05.79 SEROPOSITIVE RHEUMATOID ARTHRITIS OF MULTIPLE SITES (H): ICD-10-CM

## 2021-03-26 DIAGNOSIS — Z79.899 ENCOUNTER FOR LONG-TERM (CURRENT) USE OF OTHER MEDICATIONS: ICD-10-CM

## 2021-03-26 LAB
ALBUMIN SERPL-MCNC: 4.4 G/DL (ref 3.5–5.7)
ALP SERPL-CCNC: 57 U/L (ref 34–104)
ALT SERPL W P-5'-P-CCNC: 14 U/L (ref 7–52)
ANION GAP SERPL CALCULATED.3IONS-SCNC: 7 MMOL/L (ref 3–14)
AST SERPL W P-5'-P-CCNC: 21 U/L (ref 13–39)
BASOPHILS # BLD AUTO: 0.1 10E9/L (ref 0–0.2)
BASOPHILS NFR BLD AUTO: 1.7 %
BILIRUB SERPL-MCNC: 0.5 MG/DL (ref 0.3–1)
BUN SERPL-MCNC: 14 MG/DL (ref 7–25)
CALCIUM SERPL-MCNC: 10.1 MG/DL (ref 8.6–10.3)
CHLORIDE SERPL-SCNC: 103 MMOL/L (ref 98–107)
CHOLEST SERPL-MCNC: 251 MG/DL
CO2 SERPL-SCNC: 28 MMOL/L (ref 21–31)
CREAT SERPL-MCNC: 1.01 MG/DL (ref 0.6–1.2)
CRP SERPL-MCNC: <1 MG/L
DIFFERENTIAL METHOD BLD: NORMAL
EOSINOPHIL # BLD AUTO: 0.2 10E9/L (ref 0–0.7)
EOSINOPHIL NFR BLD AUTO: 4.6 %
ERYTHROCYTE [DISTWIDTH] IN BLOOD BY AUTOMATED COUNT: 13.8 % (ref 10–15)
ERYTHROCYTE [SEDIMENTATION RATE] IN BLOOD BY WESTERGREN METHOD: 11 MM/H (ref 1–15)
GFR SERPL CREATININE-BSD FRML MDRD: 53 ML/MIN/{1.73_M2}
GLUCOSE SERPL-MCNC: 98 MG/DL (ref 70–105)
HCT VFR BLD AUTO: 40.6 % (ref 35–47)
HDLC SERPL-MCNC: 78 MG/DL (ref 23–92)
HGB BLD-MCNC: 13.3 G/DL (ref 11.7–15.7)
IMM GRANULOCYTES # BLD: 0 10E9/L (ref 0–0.4)
IMM GRANULOCYTES NFR BLD: 0.2 %
LDLC SERPL CALC-MCNC: 158 MG/DL
LYMPHOCYTES # BLD AUTO: 1 10E9/L (ref 0.8–5.3)
LYMPHOCYTES NFR BLD AUTO: 24.5 %
MCH RBC QN AUTO: 31.7 PG (ref 26.5–33)
MCHC RBC AUTO-ENTMCNC: 32.8 G/DL (ref 31.5–36.5)
MCV RBC AUTO: 97 FL (ref 78–100)
MONOCYTES # BLD AUTO: 0.6 10E9/L (ref 0–1.3)
MONOCYTES NFR BLD AUTO: 13.5 %
NEUTROPHILS # BLD AUTO: 2.3 10E9/L (ref 1.6–8.3)
NEUTROPHILS NFR BLD AUTO: 55.5 %
NONHDLC SERPL-MCNC: 173 MG/DL
PLATELET # BLD AUTO: 272 10E9/L (ref 150–450)
POTASSIUM SERPL-SCNC: 4.1 MMOL/L (ref 3.5–5.1)
PROT SERPL-MCNC: 7.1 G/DL (ref 6.4–8.9)
RBC # BLD AUTO: 4.2 10E12/L (ref 3.8–5.2)
SODIUM SERPL-SCNC: 138 MMOL/L (ref 134–144)
TRIGL SERPL-MCNC: 75 MG/DL
WBC # BLD AUTO: 4.2 10E9/L (ref 4–11)

## 2021-03-26 PROCEDURE — 86140 C-REACTIVE PROTEIN: CPT | Mod: ZL | Performed by: NURSE PRACTITIONER

## 2021-03-26 PROCEDURE — 85652 RBC SED RATE AUTOMATED: CPT | Mod: ZL | Performed by: NURSE PRACTITIONER

## 2021-03-26 PROCEDURE — 85025 COMPLETE CBC W/AUTO DIFF WBC: CPT | Mod: ZL | Performed by: NURSE PRACTITIONER

## 2021-03-26 PROCEDURE — 80053 COMPREHEN METABOLIC PANEL: CPT | Mod: ZL | Performed by: NURSE PRACTITIONER

## 2021-03-26 PROCEDURE — 36415 COLL VENOUS BLD VENIPUNCTURE: CPT | Mod: ZL | Performed by: NURSE PRACTITIONER

## 2021-03-26 PROCEDURE — 80061 LIPID PANEL: CPT | Mod: ZL | Performed by: NURSE PRACTITIONER

## 2021-03-31 ENCOUNTER — HOSPITAL ENCOUNTER (OUTPATIENT)
Dept: CARDIOLOGY | Facility: OTHER | Age: 81
Discharge: HOME | End: 2021-03-31
Attending: NURSE PRACTITIONER | Admitting: NURSE PRACTITIONER
Payer: MEDICARE

## 2021-03-31 DIAGNOSIS — G45.9 TIA (TRANSIENT ISCHEMIC ATTACK): ICD-10-CM

## 2021-03-31 DIAGNOSIS — I63.9 CEREBROVASCULAR ACCIDENT (CVA), UNSPECIFIED MECHANISM (H): ICD-10-CM

## 2021-03-31 PROCEDURE — 93248 EXT ECG>7D<15D REV&INTERPJ: CPT | Performed by: INTERNAL MEDICINE

## 2021-03-31 PROCEDURE — 999N000157 HC STATISTIC RCP TIME EA 10 MIN

## 2021-03-31 PROCEDURE — 93246 EXT ECG>7D<15D RECORDING: CPT

## 2021-03-31 NOTE — PATIENT INSTRUCTIONS
Date Placed on Pt:  3/31/21     Patient instructed not to:   -take baths, swim, sauna   -remove device prior to 14 days   -use electric blankets   -shower or sweat excessively within first 24 hours of device application  Patient instructed to:   -shower as needed   -be carefull when toweling off and dressing   -press button when cardiac symptoms occur   -document symptoms in log book   -remove and return device (send via mail to [facility])   -Call [facility] with any questions

## 2021-04-01 ENCOUNTER — PATIENT OUTREACH (OUTPATIENT)
Dept: INTERNAL MEDICINE | Facility: OTHER | Age: 81
End: 2021-04-01

## 2021-04-01 NOTE — TELEPHONE ENCOUNTER
Clinic Care Coordination Contact  Care Team Conversations    Patient had expressed interest in the Rm & Rm vaccination when available. Called patient and she scheduled on 4-6-2021.   Milana Burris RN on 4/1/2021 at 9:25 AM]

## 2021-04-06 ENCOUNTER — IMMUNIZATION (OUTPATIENT)
Dept: FAMILY MEDICINE | Facility: OTHER | Age: 81
End: 2021-04-06
Attending: FAMILY MEDICINE
Payer: MEDICARE

## 2021-04-06 ENCOUNTER — HOSPITAL ENCOUNTER (OUTPATIENT)
Dept: PHYSICAL THERAPY | Facility: OTHER | Age: 81
Setting detail: THERAPIES SERIES
End: 2021-04-06
Attending: FAMILY MEDICINE
Payer: MEDICARE

## 2021-04-06 DIAGNOSIS — M25.512 LEFT SHOULDER PAIN, UNSPECIFIED CHRONICITY: ICD-10-CM

## 2021-04-06 PROCEDURE — 97110 THERAPEUTIC EXERCISES: CPT | Mod: GP

## 2021-04-06 PROCEDURE — 97161 PT EVAL LOW COMPLEX 20 MIN: CPT | Mod: GP

## 2021-04-06 PROCEDURE — 91303 PR COVID VAC JANSSEN AD26 0.5ML: CPT

## 2021-04-06 NOTE — PROGRESS NOTES
[facility]          OUTPATIENT PHYSICAL THERAPY ORTHOPEDIC EVALUATION  PLAN OF TREATMENT FOR OUTPATIENT REHABILITATION  (COMPLETE FOR INITIAL CLAIMS ONLY)  Patient's Last Name, First Name, M.I.  YOB: 1940  Verena Wise    Provider s Name:  [facility]   Medical Record No.  [id number]   Start of Care Date:  04/06/21   Onset Date:      Type:     _X__PT   ___OT   ___SLP Medical Diagnosis:  Left shoulder pain, unspecified chronicity M25.512     PT Diagnosis:  Impaired mobility left UE due to impingement syndrome.    Visits from SOC:  1      _________________________________________________________________________________  Plan of Treatment/Functional Goals:  joint mobilization, manual therapy, ROM, strengthening, stretching     Ultrasound, Hot packs, Cryotherapy  Use as indicated as an adjunct to the intended course of manual physical therapy  Goals  Goal Identifier: Pain  Goal Description: Patient will report that she is able to don and doff upper extremity clothing at least 2 times per day without limitation due to left shoulder pain.  She will also be able to lift groceries and cooking items up to 2 pounds to overhead shelves without pain in excess of 2/10, 4 to 5 days/week in 10 to 12 weeks.  Target Date: 07/06/21    Goal Identifier: Strength  Goal Description: Patient will demonstrate grade 4+ to 5 of 5 strength in all muscles of the left shoulder and rotator cuff.  This will provide support for the left shoulder and allow her to easily lift items up to 2 or 3 pounds to overhead shelves every day.  She also will be able to complete overhead tasks such as cleaning windows and washing walls 3 days/week.  She will be able to complete gardening tasks using hand tools for digging carrying and lifting of weight up to 20pounds 1 to 2 hours 3-4 times per week.  She will accomplish this goal within 12 weeks.  Target Date: 07/06/21    Goal  Identifier: Mobility  Goal Description: Patient will demonstrate shoulder flexion and abduction to 150 degrees bilaterally, EROT /  IROT to 80 degrees bilaterally allowing use of the left upper extremity and elevated positions for completing daily tasks such as cleaning windows and walls and placing things in overhead shelves.  She will be able to easily complete daily dressing and tasks requiring reaching around fully to the back within 10 to 12 weeks.  Target Date: 08/06/21                                                           Therapy Frequency:  2 times/Week  Predicted Duration of Therapy Intervention:  10 to 12 weeks    Catrachito Mccann, PT                 I CERTIFY THE NEED FOR THESE SERVICES FURNISHED UNDER        THIS PLAN OF TREATMENT AND WHILE UNDER MY CARE     (Physician co-signature of this document indicates review and certification of the therapy plan).                       Certification Date From:  04/07/21   Certification Date To:  07/06/21    Referring Provider:  Sejal Zazueta D.O.    Initial Assessment        See Epic Evaluation Start of Care Date: 04/06/21

## 2021-04-06 NOTE — PROGRESS NOTES
"   04/06/21 1300   General Information   Type of Visit Initial OP Ortho PT Evaluation   Start of Care Date 04/06/21   Referring Physician Sejal Zazueta D.O.   Patient/Family Goals Statement To be able to use her left shoulder for lifting/carrying w/o pain   Orders Evaluate and Treat   Date of Order 03/12/21   Certification Required? Yes   Medical Diagnosis Left shoulder pain, unspecified chronicity M25.512   Surgical/Medical history reviewed Yes   Precautions/Limitations no known precautions/limitations   Special Instructions None   Body Part(s)   Body Part(s) Shoulder   Presentation and Etiology   Pertinent history of current problem (include personal factors and/or comorbidities that impact the POC) Patient is a 79 y/o female whom presents to PT with c/o left shoulder pain of several months duration. Since she was scheduled for this PT evaluation she was hospitalized with a TIA/Stroke. Reports she has been \"wiped out\" since that episode. Her shoulder pain was very intense just before the TIA episode. She feels that the intense left shoulder pain was related to the TIA because this has happened twice in the past. A CT scan revealed at least two previous TIA's. She reports current increased body aches and  fatigue. She also just recieved her Rm and Rm Covid 19 vaccination as well. She was diagnosed with fibromyalgia several years ago as well. In regard to her left shoulder, currently her pain is intermittent and of variable intensity. She has pain with lifting overhead and behind the back, She does like to walk every day and leads a Bone builders program.    Impairments A. Pain;D. Decreased ROM;E. Decreased flexibility;F. Decreased strength and endurance   Functional Limitations perform desired leisure / sports activities;perform activities of daily living   Symptom Location Left anterior and lateral left shoulder   How/Where did it occur From insidious onset   Pain rating (0-10 point scale) Best " (/10);Worst (/10)   Best (/10) 0   Worst (/10) 3-4/10   Pain quality C. Aching;E. Shooting;F. Stabbing   Current / Previous Interventions   Diagnostic Tests: Other   Other diagnostic tests None of the left shoulder   Prior Level of Function   Prior Level of Function-Mobility Intermittent episodes of left shoulder limited mobility due to pain.   Prior Level of Function-ADLs NML   Current Level of Function   Current Community Support Family/friend caregiver   Patient role/employment history F. Retired   Living environment [facility]   Home/community accessibility NML   Fall Risk Screen   Fall screen completed by PT   Have you fallen 2 or more times in the past year? No   Have you fallen and had an injury in the past year? No   Timed Up and Go score (seconds) 8   Is patient a fall risk? No   Abuse Screen (yes response referral indicated)   Feels Unsafe at Home or Work/School no   Feels Threatened by Someone no   Does Anyone Try to Keep You From Having Contact with Others or Doing Things Outside Your Home? no   Physical Signs of Abuse Present no   Vitals Signs   Heart Rate 72   Blood Pressure 116/72   Shoulder Objective Findings   Side (if bilateral, select both right and left) Left   Observation Patient walks slowly and has a dull affect.    Integumentary  NML   Posture Fwd head and rounded shoulders   Cervical Screen (ROM, quadrant) Limited left cervical rotation   Thoracic Mobility Screen Limited thoracic extension   Shoulder ROM Comment AROM limitation is due to pain    Scapulothoracic Rhythm Left scapular elevation at attempts to flex or abduct the shoulder actively   Neer's Test Positive   Castillo-Calvin Test Positive   Bursa Test Positive   Gary's Test Negative   Load and Shift Test Negative   Crossover Test Negative   Palpation Tender left anterior shoulder in the area of the supraspinatus insertion.   Left Shoulder Flexion AROM 95, right 120   Left Shoulder Flexion PROM 140   Left Shoulder Abduction  AROM 95 left 100 right   Left Shoulder Abduction PROM 145   Left Shoulder ER AROM 65   Left Shoulder ER PROM 73   Left Shoulder IR AROM to upper lumbar region with pain   Left Shoulder IR PROM 75    Left Shoulder Flexion Strength 3 -   Left Shoulder Abduction Strength 3 -   Left Shoulder ER Strength 4 -   Left Shoulder IR Strength 4 -   Left Shoulder Extension Strength 4   Left Mid Trapezius Strength 4   Left Lower Trapezius Strength Not tested limited range of motion   Planned Therapy Interventions   Planned Therapy Interventions joint mobilization;manual therapy;ROM;strengthening;stretching   Planned Modality Interventions   Planned Modality Interventions Ultrasound;Hot packs;Cryotherapy   Planned Modality Interventions Comments Use as indicated as an adjunct to the intended course of manual physical therapy   Clinical Impression   Criteria for Skilled Therapeutic Interventions Met yes, treatment indicated   PT Diagnosis Impaired mobility left UE due to impingement syndrome.    Influenced by the following impairments PaIin,    Functional limitations due to impairments Dressing, lifting gallon of milk, she is having difficulty with her bone builder exercises.    Clinical Presentation Stable/Uncomplicated   Clinical Decision Making (Complexity) Low complexity   Therapy Frequency 2 times/Week   Predicted Duration of Therapy Intervention (days/wks) 10 to 12 weeks   Risk & Benefits of therapy have been explained Yes   Patient, Family & other staff in agreement with plan of care Yes   Clinical Impression Comments Patient's range of motion limitation is likely due to self-imposed movement restrictions due to pain.   Education Assessment   Preferred Learning Style Listening;Reading;Demonstration;Pictures/video   Barriers to Learning No barriers   ORTHO GOALS   PT Ortho Eval Goals 1;2;3   Ortho Goal 1   Goal Identifier Pain   Goal Description Patient will report that she is able to don and doff upper extremity clothing at  least 2 times per day without limitation due to left shoulder pain.  She will also be able to lift groceries and cooking items up to 2 pounds to overhead shelves without pain in excess of 2/10, 4 to 5 days/week in 10 to 12 weeks.   Target Date 07/06/21   Ortho Goal 2   Goal Identifier Strength   Goal Description Patient will demonstrate grade 4+ to 5 of 5 strength in all muscles of the left shoulder and rotator cuff.  This will provide support for the left shoulder and allow her to easily lift items up to 2 or 3 pounds to overhead shelves every day.  She also will be able to complete overhead tasks such as cleaning windows and washing walls 3 days/week.  She will be able to complete gardening tasks using hand tools for digging carrying and lifting of weight up to 20pounds 1 to 2 hours 3-4 times per week.  She will accomplish this goal within 12 weeks.   Target Date 07/06/21   Ortho Goal 3   Goal Identifier Mobility   Goal Description Patient will demonstrate shoulder flexion and abduction to 150 degrees bilaterally, EROT /  IROT to 80 degrees bilaterally allowing use of the left upper extremity and elevated positions for completing daily tasks such as cleaning windows and walls and placing things in overhead shelves.  She will be able to easily complete daily dressing and tasks requiring reaching around fully to the back within 10 to 12 weeks.   Target Date 08/06/21   Total Evaluation Time   PT Eval, Low Complexity Minutes (72829) 35   Therapy Certification   Certification date from 04/07/21   Certification date to 07/06/21   Medical Diagnosis Left shoulder pain, unspecified chronicity M25.512

## 2021-04-12 ENCOUNTER — TELEPHONE (OUTPATIENT)
Dept: INTERNAL MEDICINE | Facility: OTHER | Age: 81
End: 2021-04-12

## 2021-04-12 NOTE — TELEPHONE ENCOUNTER
RKV-patient is taking Lipitor and it is making her joints and muscles hurt    Please call and advise    Thank You    Chioma Mason on 4/12/2021 at 3:46 PM

## 2021-04-13 ENCOUNTER — TELEPHONE (OUTPATIENT)
Dept: INTERNAL MEDICINE | Facility: OTHER | Age: 81
End: 2021-04-13

## 2021-04-13 NOTE — TELEPHONE ENCOUNTER
She does not have a cardiologist. If she needs one please place referral. She is having a echocardiogram this week. What should she do about the Lipitor?  Norma J. Gosselin, LPN .......  4/13/2021  11:26 AM

## 2021-04-13 NOTE — TELEPHONE ENCOUNTER
Reason for call: Patient wanting a work in appointment.    Patient is having the following symptoms:  Follow Up Labs & Results for 4 day    The patient is requesting an appointment with  RK[facility]    Was an appointment offered for this call? Yes    If Yes, what is the date of the appointment?  4/26 @ 9:40 but would like the week before if at all possible     Preferred method for responding to this message: Telephone Call    Phone number patient can be reached at? Home number on file [phone] (home)    If we can't reach you directly, may we leave a detailed response at the number you provided? Yes    Can this message wait until your PCP/provider returns if unavailable today? Yes

## 2021-04-13 NOTE — TELEPHONE ENCOUNTER
Have her place the Lipitor on hold and get an appointment to see me in clinic next week and hopefully the results from the heart monitor and echocardiogram are back by then

## 2021-04-13 NOTE — TELEPHONE ENCOUNTER
I would recommend that she discuss this with her cardiologist.  It looks like she has an appointment this week

## 2021-04-13 NOTE — TELEPHONE ENCOUNTER
Patient notified and she was transferred to set up an apt.     Kimberly Kelley LPN........................4/13/2021  12:05 PM

## 2021-04-14 ENCOUNTER — HOSPITAL ENCOUNTER (OUTPATIENT)
Dept: PHYSICAL THERAPY | Facility: OTHER | Age: 81
Setting detail: THERAPIES SERIES
End: 2021-04-14
Attending: FAMILY MEDICINE
Payer: MEDICARE

## 2021-04-14 PROCEDURE — 97110 THERAPEUTIC EXERCISES: CPT | Mod: GP

## 2021-04-16 ENCOUNTER — HOSPITAL ENCOUNTER (OUTPATIENT)
Dept: CARDIOLOGY | Facility: OTHER | Age: 81
Discharge: HOME | End: 2021-04-16
Attending: NURSE PRACTITIONER | Admitting: NURSE PRACTITIONER
Payer: MEDICARE

## 2021-04-16 PROCEDURE — 93306 TTE W/DOPPLER COMPLETE: CPT | Mod: 26 | Performed by: INTERNAL MEDICINE

## 2021-04-16 PROCEDURE — 93306 TTE W/DOPPLER COMPLETE: CPT

## 2021-04-16 NOTE — PROGRESS NOTES
[facility]          OUTPATIENT PHYSICAL THERAPY ORTHOPEDIC EVALUATION  PLAN OF TREATMENT FOR OUTPATIENT REHABILITATION  (COMPLETE FOR INITIAL CLAIMS ONLY)  Patient's Last Name, First Name, M.I.  YOB: 1940  Verena Wise    Provider s Name:  [facility]   Medical Record No.  [id number]   Start of Care Date:  04/06/21   Onset Date:  03/12/21(Date of referral)   Type:     _X__PT   ___OT   ___SLP Medical Diagnosis:  Left shoulder pain, unspecified chronicity M25.512     PT Diagnosis:  Impaired mobility left UE due to impingement syndrome.    Visits from SOC:  1      _________________________________________________________________________________  Plan of Treatment/Functional Goals:  joint mobilization, manual therapy, ROM, strengthening, stretching     Ultrasound, Hot packs, Cryotherapy  Use as indicated as an adjunct to the intended course of manual physical therapy  Goals  Goal Identifier: Pain  Goal Description: Patient will report that she is able to don and doff upper extremity clothing at least 2 times per day without limitation due to left shoulder pain.  She will also be able to lift groceries and cooking items up to 2 pounds to overhead shelves without pain in excess of 2/10, 4 to 5 days/week in 10 to 12 weeks.  Target Date: 07/06/21    Goal Identifier: Strength  Goal Description: Patient will demonstrate grade 4+ to 5 of 5 strength in all muscles of the left shoulder and rotator cuff.  This will provide support for the left shoulder and allow her to easily lift items up to 2 or 3 pounds to overhead shelves every day.  She also will be able to complete overhead tasks such as cleaning windows and washing walls 3 days/week.  She will be able to complete gardening tasks using hand tools for digging carrying and lifting of weight up to 20pounds 1 to 2 hours 3-4 times per week.  She will accomplish this goal within 12 weeks.  Target  Date: 07/06/21    Goal Identifier: Mobility  Goal Description: Patient will demonstrate shoulder flexion and abduction to 150 degrees bilaterally, EROT /  IROT to 80 degrees bilaterally allowing use of the left upper extremity and elevated positions for completing daily tasks such as cleaning windows and walls and placing things in overhead shelves.  She will be able to easily complete daily dressing and tasks requiring reaching around fully to the back within 10 to 12 weeks.  Target Date: 08/06/21                                                           Therapy Frequency:  2 times/Week  Predicted Duration of Therapy Intervention:  10 to 12 weeks    Catrachito Mccann, PT                 I CERTIFY THE NEED FOR THESE SERVICES FURNISHED UNDER        THIS PLAN OF TREATMENT AND WHILE UNDER MY CARE     (Physician co-signature of this document indicates review and certification of the therapy plan).                       Certification Date From:  04/07/21   Certification Date To:  07/06/21    Referring Provider:  Sejal Zazueta D.O.    Initial Assessment        See Epic Evaluation Start of Care Date: 04/06/21

## 2021-04-20 ENCOUNTER — OFFICE VISIT (OUTPATIENT)
Dept: INTERNAL MEDICINE | Facility: OTHER | Age: 81
End: 2021-04-20
Attending: NURSE PRACTITIONER
Payer: COMMERCIAL

## 2021-04-20 VITALS
WEIGHT: 156.8 LBS | BODY MASS INDEX: 25.97 KG/M2 | HEART RATE: 82 BPM | SYSTOLIC BLOOD PRESSURE: 118 MMHG | OXYGEN SATURATION: 99 % | TEMPERATURE: 97.9 F | RESPIRATION RATE: 20 BRPM | DIASTOLIC BLOOD PRESSURE: 74 MMHG

## 2021-04-20 DIAGNOSIS — N18.31 STAGE 3A CHRONIC KIDNEY DISEASE (H): ICD-10-CM

## 2021-04-20 DIAGNOSIS — M77.8 RIGHT SHOULDER TENDONITIS: ICD-10-CM

## 2021-04-20 DIAGNOSIS — G45.9 TIA (TRANSIENT ISCHEMIC ATTACK): Primary | ICD-10-CM

## 2021-04-20 DIAGNOSIS — I63.9 CEREBROVASCULAR ACCIDENT (CVA), UNSPECIFIED MECHANISM (H): ICD-10-CM

## 2021-04-20 DIAGNOSIS — M05.79 SEROPOSITIVE RHEUMATOID ARTHRITIS OF MULTIPLE SITES (H): ICD-10-CM

## 2021-04-20 PROCEDURE — G0463 HOSPITAL OUTPT CLINIC VISIT: HCPCS

## 2021-04-20 PROCEDURE — 99214 OFFICE O/P EST MOD 30 MIN: CPT | Performed by: NURSE PRACTITIONER

## 2021-04-20 RX ORDER — ROSUVASTATIN CALCIUM 5 MG/1
5 TABLET, COATED ORAL DAILY
Qty: 30 TABLET | Refills: 3 | Status: SHIPPED | OUTPATIENT
Start: 2021-04-20 | End: 2022-03-08

## 2021-04-20 ASSESSMENT — ENCOUNTER SYMPTOMS
SHORTNESS OF BREATH: 0
CHEST TIGHTNESS: 0
DIAPHORESIS: 0

## 2021-04-20 ASSESSMENT — PAIN SCALES - GENERAL: PAINLEVEL: MODERATE PAIN (4)
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
## 2021-04-25 ENCOUNTER — HEALTH MAINTENANCE LETTER (OUTPATIENT)
Age: 81
End: 2021-04-25

## 2021-07-09 DIAGNOSIS — Z79.899 ENCOUNTER FOR LONG-TERM (CURRENT) USE OF OTHER MEDICATIONS: ICD-10-CM

## 2021-07-09 DIAGNOSIS — M05.79 SEROPOSITIVE RHEUMATOID ARTHRITIS OF MULTIPLE SITES (H): ICD-10-CM

## 2021-07-09 LAB
ALBUMIN SERPL-MCNC: 4.4 G/DL (ref 3.5–5.7)
ALP SERPL-CCNC: 69 U/L (ref 34–104)
ALT SERPL W P-5'-P-CCNC: 18 U/L (ref 7–52)
ANION GAP SERPL CALCULATED.3IONS-SCNC: 5 MMOL/L (ref 3–14)
AST SERPL W P-5'-P-CCNC: 22 U/L (ref 13–39)
BASOPHILS # BLD AUTO: 0.1 10E9/L (ref 0–0.2)
BASOPHILS NFR BLD AUTO: 1.8 %
BILIRUB SERPL-MCNC: 0.5 MG/DL (ref 0.3–1)
BUN SERPL-MCNC: 15 MG/DL (ref 7–25)
CALCIUM SERPL-MCNC: 10.5 MG/DL (ref 8.6–10.3)
CHLORIDE SERPL-SCNC: 102 MMOL/L (ref 98–107)
CO2 SERPL-SCNC: 32 MMOL/L (ref 21–31)
CREAT SERPL-MCNC: 1.06 MG/DL (ref 0.6–1.2)
CRP SERPL-MCNC: 1 MG/L
DIFFERENTIAL METHOD BLD: NORMAL
EOSINOPHIL # BLD AUTO: 0.2 10E9/L (ref 0–0.7)
EOSINOPHIL NFR BLD AUTO: 4.4 %
ERYTHROCYTE [DISTWIDTH] IN BLOOD BY AUTOMATED COUNT: 14 % (ref 10–15)
ERYTHROCYTE [SEDIMENTATION RATE] IN BLOOD BY WESTERGREN METHOD: 11 MM/H (ref 1–15)
GFR SERPL CREATININE-BSD FRML MDRD: 50 ML/MIN/{1.73_M2}
GLUCOSE SERPL-MCNC: 93 MG/DL (ref 70–105)
HCT VFR BLD AUTO: 40.8 % (ref 35–47)
HGB BLD-MCNC: 13.5 G/DL (ref 11.7–15.7)
IMM GRANULOCYTES # BLD: 0 10E9/L (ref 0–0.4)
IMM GRANULOCYTES NFR BLD: 0.4 %
LYMPHOCYTES # BLD AUTO: 0.9 10E9/L (ref 0.8–5.3)
LYMPHOCYTES NFR BLD AUTO: 19.3 %
MCH RBC QN AUTO: 32.5 PG (ref 26.5–33)
MCHC RBC AUTO-ENTMCNC: 33.1 G/DL (ref 31.5–36.5)
MCV RBC AUTO: 98 FL (ref 78–100)
MONOCYTES # BLD AUTO: 0.7 10E9/L (ref 0–1.3)
MONOCYTES NFR BLD AUTO: 14.3 %
NEUTROPHILS # BLD AUTO: 2.7 10E9/L (ref 1.6–8.3)
NEUTROPHILS NFR BLD AUTO: 59.8 %
PLATELET # BLD AUTO: 278 10E9/L (ref 150–450)
POTASSIUM SERPL-SCNC: 4.3 MMOL/L (ref 3.5–5.1)
PROT SERPL-MCNC: 7.4 G/DL (ref 6.4–8.9)
RBC # BLD AUTO: 4.16 10E12/L (ref 3.8–5.2)
SODIUM SERPL-SCNC: 139 MMOL/L (ref 134–144)
WBC # BLD AUTO: 4.6 10E9/L (ref 4–11)

## 2021-07-09 PROCEDURE — 36415 COLL VENOUS BLD VENIPUNCTURE: CPT | Mod: ZL

## 2021-07-09 PROCEDURE — 85652 RBC SED RATE AUTOMATED: CPT | Mod: ZL

## 2021-07-09 PROCEDURE — 80053 COMPREHEN METABOLIC PANEL: CPT | Mod: ZL

## 2021-07-09 PROCEDURE — 85025 COMPLETE CBC W/AUTO DIFF WBC: CPT | Mod: ZL

## 2021-07-09 PROCEDURE — 86140 C-REACTIVE PROTEIN: CPT | Mod: ZL

## 2021-07-12 ENCOUNTER — TELEPHONE (OUTPATIENT)
Dept: INTERNAL MEDICINE | Facility: OTHER | Age: 81
End: 2021-07-12

## 2021-07-12 DIAGNOSIS — G45.9 TIA (TRANSIENT ISCHEMIC ATTACK): ICD-10-CM

## 2021-07-12 DIAGNOSIS — I63.9 CEREBROVASCULAR ACCIDENT (CVA), UNSPECIFIED MECHANISM (H): Primary | ICD-10-CM

## 2021-07-12 NOTE — TELEPHONE ENCOUNTER
Patient called wanting to set up a cardiology appointment.  A referral is needed as she has not been followed by cardiology in the past.  Would RKV please place?  Thank you!  Claudia Frederick on 7/12/2021 at 1:57 PM

## 2021-07-22 ENCOUNTER — OFFICE VISIT (OUTPATIENT)
Dept: FAMILY MEDICINE | Facility: OTHER | Age: 81
End: 2021-07-22
Attending: NURSE PRACTITIONER
Payer: COMMERCIAL

## 2021-07-22 VITALS
TEMPERATURE: 98.3 F | BODY MASS INDEX: 25.55 KG/M2 | WEIGHT: 159 LBS | RESPIRATION RATE: 19 BRPM | OXYGEN SATURATION: 95 % | SYSTOLIC BLOOD PRESSURE: 120 MMHG | DIASTOLIC BLOOD PRESSURE: 80 MMHG | HEART RATE: 77 BPM | HEIGHT: 66 IN

## 2021-07-22 DIAGNOSIS — R22.31 LUMP OF SKIN OF RIGHT UPPER EXTREMITY: Primary | ICD-10-CM

## 2021-07-22 PROCEDURE — G0463 HOSPITAL OUTPT CLINIC VISIT: HCPCS

## 2021-07-22 PROCEDURE — 99213 OFFICE O/P EST LOW 20 MIN: CPT | Performed by: NURSE PRACTITIONER

## 2021-07-22 ASSESSMENT — MIFFLIN-ST. JEOR: SCORE: 1195.03

## 2021-07-22 ASSESSMENT — PAIN SCALES - GENERAL: PAINLEVEL: NO PAIN (0)
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
## 2021-07-22 NOTE — NURSING NOTE
"Chief Complaint   Patient presents with     Mass     inner right arm     Patient presents to clinic with lump on inner right arm. She states she noticed it on Thursday, and it has not gotten any bigger or smaller since then.     Initial /80 (BP Location: Right arm, Patient Position: Sitting, Cuff Size: Adult Regular)   Pulse 77   Temp 98.3  F (36.8  C) (Temporal)   Resp 19   Ht 1.664 m (5' 5.5\")   Wt 72.1 kg (159 lb)   SpO2 95%   BMI 26.06 kg/m   Estimated body mass index is 26.06 kg/m  as calculated from the following:    Height as of this encounter: 1.664 m (5' 5.5\").    Weight as of this encounter: 72.1 kg (159 lb).     FOOD SECURITY SCREENING QUESTIONS  Hunger Vital Signs:  Within the past 12 months we worried whether our food would run out before we got money to buy more. Never  Within the past 12 months the food we bought just didn't last and we didn't have money to get more. Never      Medication Reconciliation: Complete      Kasey Morillo   "
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
## 2021-09-02 ENCOUNTER — OFFICE VISIT (OUTPATIENT)
Dept: CARDIOLOGY | Facility: OTHER | Age: 81
End: 2021-09-02
Attending: NURSE PRACTITIONER
Payer: COMMERCIAL

## 2021-09-02 VITALS
OXYGEN SATURATION: 96 % | DIASTOLIC BLOOD PRESSURE: 74 MMHG | WEIGHT: 155 LBS | HEART RATE: 77 BPM | HEIGHT: 65 IN | SYSTOLIC BLOOD PRESSURE: 122 MMHG | BODY MASS INDEX: 25.83 KG/M2 | RESPIRATION RATE: 16 BRPM | TEMPERATURE: 98.4 F

## 2021-09-02 DIAGNOSIS — I63.9 CEREBROVASCULAR ACCIDENT (CVA), UNSPECIFIED MECHANISM (H): ICD-10-CM

## 2021-09-02 DIAGNOSIS — N18.31 STAGE 3A CHRONIC KIDNEY DISEASE (H): ICD-10-CM

## 2021-09-02 DIAGNOSIS — E78.2 MIXED HYPERLIPIDEMIA: ICD-10-CM

## 2021-09-02 DIAGNOSIS — Z86.73 HISTORY OF STROKE: Primary | ICD-10-CM

## 2021-09-02 DIAGNOSIS — R07.9 CHEST PAIN, UNSPECIFIED TYPE: ICD-10-CM

## 2021-09-02 PROCEDURE — G0463 HOSPITAL OUTPT CLINIC VISIT: HCPCS | Mod: 25

## 2021-09-02 PROCEDURE — 93010 ELECTROCARDIOGRAM REPORT: CPT | Performed by: INTERNAL MEDICINE

## 2021-09-02 PROCEDURE — 93005 ELECTROCARDIOGRAM TRACING: CPT | Performed by: INTERNAL MEDICINE

## 2021-09-02 PROCEDURE — G0463 HOSPITAL OUTPT CLINIC VISIT: HCPCS

## 2021-09-02 PROCEDURE — 99205 OFFICE O/P NEW HI 60 MIN: CPT | Performed by: INTERNAL MEDICINE

## 2021-09-02 RX ORDER — SIMVASTATIN 40 MG
40 TABLET ORAL AT BEDTIME
Qty: 90 TABLET | Refills: 3 | Status: SHIPPED | OUTPATIENT
Start: 2021-09-02 | End: 2022-03-08

## 2021-09-02 ASSESSMENT — MIFFLIN-ST. JEOR: SCORE: 1168.96

## 2021-09-02 ASSESSMENT — PAIN SCALES - GENERAL: PAINLEVEL: NO PAIN (0)

## 2021-09-02 NOTE — NURSING NOTE
Patient presents to clinic today for consult    Medication reconciliation completed.    ACP on file? No, form provided.    FOOD SECURITY SCREENING QUESTIONS  Hunger Vital Signs:  Within the past 12 months we worried whether our food would run out before we got money to buy more. Never  Within the past 12 months the food we bought just didn't last and we didn't have money to get more. Never    Cyndi Polo CMA(AAMA)..................9/2/2021   9:52 AM

## 2021-09-08 LAB
ATRIAL RATE - MUSE: 74 BPM
DIASTOLIC BLOOD PRESSURE - MUSE: NORMAL MMHG
INTERPRETATION ECG - MUSE: NORMAL
P AXIS - MUSE: 59 DEGREES
PR INTERVAL - MUSE: 164 MS
QRS DURATION - MUSE: 78 MS
QT - MUSE: 364 MS
QTC - MUSE: 404 MS
R AXIS - MUSE: 45 DEGREES
SYSTOLIC BLOOD PRESSURE - MUSE: NORMAL MMHG
T AXIS - MUSE: 57 DEGREES
VENTRICULAR RATE- MUSE: 74 BPM

## 2021-09-15 ENCOUNTER — TRANSFERRED RECORDS (OUTPATIENT)
Dept: HEALTH INFORMATION MANAGEMENT | Facility: OTHER | Age: 81
End: 2021-09-15

## 2021-10-09 ENCOUNTER — HEALTH MAINTENANCE LETTER (OUTPATIENT)
Age: 81
End: 2021-10-09

## 2021-10-13 ENCOUNTER — LAB (OUTPATIENT)
Dept: LAB | Facility: OTHER | Age: 81
End: 2021-10-13
Attending: INTERNAL MEDICINE
Payer: MEDICARE

## 2021-10-13 DIAGNOSIS — Z79.899 DRUG THERAPY: ICD-10-CM

## 2021-10-13 DIAGNOSIS — M05.79 SEROPOSITIVE RHEUMATOID ARTHRITIS OF MULTIPLE SITES (H): ICD-10-CM

## 2021-10-13 DIAGNOSIS — E83.52 HYPERCALCEMIA: ICD-10-CM

## 2021-10-13 LAB
ALBUMIN SERPL-MCNC: 4.1 G/DL (ref 3.5–5.7)
ALP SERPL-CCNC: 66 U/L (ref 34–104)
ALT SERPL W P-5'-P-CCNC: 18 U/L (ref 7–52)
ANION GAP SERPL CALCULATED.3IONS-SCNC: 6 MMOL/L (ref 3–14)
AST SERPL W P-5'-P-CCNC: 21 U/L (ref 13–39)
BASOPHILS # BLD AUTO: 0.1 10E3/UL (ref 0–0.2)
BASOPHILS NFR BLD AUTO: 1 %
BILIRUB SERPL-MCNC: 0.4 MG/DL (ref 0.3–1)
BUN SERPL-MCNC: 19 MG/DL (ref 7–25)
CALCIUM SERPL-MCNC: 9.8 MG/DL (ref 8.6–10.3)
CHLORIDE BLD-SCNC: 104 MMOL/L (ref 98–107)
CO2 SERPL-SCNC: 27 MMOL/L (ref 21–31)
CREAT SERPL-MCNC: 0.92 MG/DL (ref 0.6–1.2)
CRP SERPL-MCNC: <1 MG/L
DEPRECATED CALCIDIOL+CALCIFEROL SERPL-MC: 65 UG/L (ref 30–100)
EOSINOPHIL # BLD AUTO: 0.2 10E3/UL (ref 0–0.7)
EOSINOPHIL NFR BLD AUTO: 3 %
ERYTHROCYTE [DISTWIDTH] IN BLOOD BY AUTOMATED COUNT: 13.6 % (ref 10–15)
ERYTHROCYTE [SEDIMENTATION RATE] IN BLOOD BY WESTERGREN METHOD: 12 MM/HR (ref 0–30)
GFR SERPL CREATININE-BSD FRML MDRD: 59 ML/MIN/1.73M2
GLUCOSE BLD-MCNC: 112 MG/DL (ref 70–105)
HCT VFR BLD AUTO: 38.1 % (ref 35–47)
HGB BLD-MCNC: 12.5 G/DL (ref 11.7–15.7)
IMM GRANULOCYTES # BLD: 0 10E3/UL
IMM GRANULOCYTES NFR BLD: 0 %
LYMPHOCYTES # BLD AUTO: 1 10E3/UL (ref 0.8–5.3)
LYMPHOCYTES NFR BLD AUTO: 20 %
MCH RBC QN AUTO: 31.6 PG (ref 26.5–33)
MCHC RBC AUTO-ENTMCNC: 32.8 G/DL (ref 31.5–36.5)
MCV RBC AUTO: 97 FL (ref 78–100)
MONOCYTES # BLD AUTO: 0.6 10E3/UL (ref 0–1.3)
MONOCYTES NFR BLD AUTO: 11 %
NEUTROPHILS # BLD AUTO: 3.5 10E3/UL (ref 1.6–8.3)
NEUTROPHILS NFR BLD AUTO: 65 %
NRBC # BLD AUTO: 0 10E3/UL
NRBC BLD AUTO-RTO: 0 /100
PLATELET # BLD AUTO: 287 10E3/UL (ref 150–450)
POTASSIUM BLD-SCNC: 4.4 MMOL/L (ref 3.5–5.1)
PROT SERPL-MCNC: 6.4 G/DL (ref 6.4–8.9)
PTH-INTACT SERPL-MCNC: 47 PG/ML (ref 12–88)
RBC # BLD AUTO: 3.95 10E6/UL (ref 3.8–5.2)
SODIUM SERPL-SCNC: 137 MMOL/L (ref 134–144)
WBC # BLD AUTO: 5.3 10E3/UL (ref 4–11)

## 2021-10-13 PROCEDURE — 36415 COLL VENOUS BLD VENIPUNCTURE: CPT | Mod: ZL

## 2021-10-13 PROCEDURE — 82040 ASSAY OF SERUM ALBUMIN: CPT | Mod: ZL

## 2021-10-13 PROCEDURE — 83970 ASSAY OF PARATHORMONE: CPT | Mod: ZL

## 2021-10-13 PROCEDURE — 85004 AUTOMATED DIFF WBC COUNT: CPT | Mod: ZL

## 2021-10-13 PROCEDURE — 86140 C-REACTIVE PROTEIN: CPT | Mod: ZL

## 2021-10-13 PROCEDURE — 85652 RBC SED RATE AUTOMATED: CPT | Mod: ZL

## 2021-10-13 PROCEDURE — 82306 VITAMIN D 25 HYDROXY: CPT | Mod: ZL

## 2022-01-03 ENCOUNTER — TELEPHONE (OUTPATIENT)
Dept: INTERNAL MEDICINE | Facility: OTHER | Age: 82
End: 2022-01-03
Payer: COMMERCIAL

## 2022-01-03 DIAGNOSIS — I63.9 CEREBROVASCULAR ACCIDENT (CVA), UNSPECIFIED MECHANISM (H): Primary | ICD-10-CM

## 2022-01-03 NOTE — TELEPHONE ENCOUNTER
Called and gave the below information to the patient.    Chayito Azul LPN on 1/3/2022 at 12:24 PM

## 2022-01-03 NOTE — TELEPHONE ENCOUNTER
Pt would like to get orders for lab work to check cholesterol level to make sure that medication is working.  Please call.      Maynor Foy on 1/3/2022 at 11:42 AM

## 2022-01-11 ENCOUNTER — LAB (OUTPATIENT)
Dept: LAB | Facility: OTHER | Age: 82
End: 2022-01-11
Attending: NURSE PRACTITIONER
Payer: MEDICARE

## 2022-01-11 ENCOUNTER — OFFICE VISIT (OUTPATIENT)
Dept: FAMILY MEDICINE | Facility: OTHER | Age: 82
End: 2022-01-11
Attending: NURSE PRACTITIONER
Payer: MEDICARE

## 2022-01-11 ENCOUNTER — HOSPITAL ENCOUNTER (OUTPATIENT)
Dept: CT IMAGING | Facility: OTHER | Age: 82
End: 2022-01-11
Attending: NURSE PRACTITIONER
Payer: MEDICARE

## 2022-01-11 VITALS
WEIGHT: 157.4 LBS | DIASTOLIC BLOOD PRESSURE: 84 MMHG | BODY MASS INDEX: 26.19 KG/M2 | TEMPERATURE: 98.2 F | HEART RATE: 84 BPM | RESPIRATION RATE: 18 BRPM | SYSTOLIC BLOOD PRESSURE: 122 MMHG

## 2022-01-11 DIAGNOSIS — M05.79 SEROPOSITIVE RHEUMATOID ARTHRITIS OF MULTIPLE SITES (H): ICD-10-CM

## 2022-01-11 DIAGNOSIS — Z79.899 ENCOUNTER FOR LONG-TERM (CURRENT) USE OF MEDICATIONS: ICD-10-CM

## 2022-01-11 DIAGNOSIS — I63.9 CEREBROVASCULAR ACCIDENT (CVA), UNSPECIFIED MECHANISM (H): ICD-10-CM

## 2022-01-11 DIAGNOSIS — R10.31 RIGHT LOWER QUADRANT PAIN: Primary | ICD-10-CM

## 2022-01-11 DIAGNOSIS — R10.31 RIGHT LOWER QUADRANT PAIN: ICD-10-CM

## 2022-01-11 LAB
ALBUMIN SERPL-MCNC: 4.6 G/DL (ref 3.5–5.7)
ALP SERPL-CCNC: 68 U/L (ref 34–104)
ALT SERPL W P-5'-P-CCNC: 23 U/L (ref 7–52)
ANION GAP SERPL CALCULATED.3IONS-SCNC: 6 MMOL/L (ref 3–14)
AST SERPL W P-5'-P-CCNC: 23 U/L (ref 13–39)
AST SERPL W P-5'-P-CCNC: 23 U/L (ref 13–39)
BASOPHILS # BLD AUTO: 0.1 10E3/UL (ref 0–0.2)
BASOPHILS NFR BLD AUTO: 2 %
BILIRUB SERPL-MCNC: 0.7 MG/DL (ref 0.3–1)
BUN SERPL-MCNC: 17 MG/DL (ref 7–25)
CALCIUM SERPL-MCNC: 10.7 MG/DL (ref 8.6–10.3)
CHLORIDE BLD-SCNC: 101 MMOL/L (ref 98–107)
CHOLEST SERPL-MCNC: 256 MG/DL
CO2 SERPL-SCNC: 31 MMOL/L (ref 21–31)
CREAT SERPL-MCNC: 0.95 MG/DL (ref 0.6–1.2)
CRP SERPL-MCNC: 1 MG/L
DEPRECATED CALCIDIOL+CALCIFEROL SERPL-MC: 63 UG/L (ref 30–100)
EOSINOPHIL # BLD AUTO: 0.2 10E3/UL (ref 0–0.7)
EOSINOPHIL NFR BLD AUTO: 4 %
ERYTHROCYTE [DISTWIDTH] IN BLOOD BY AUTOMATED COUNT: 13.7 % (ref 10–15)
ERYTHROCYTE [SEDIMENTATION RATE] IN BLOOD BY WESTERGREN METHOD: 14 MM/HR (ref 0–30)
FASTING STATUS PATIENT QL REPORTED: YES
GFR SERPL CREATININE-BSD FRML MDRD: 60 ML/MIN/1.73M2
GLUCOSE BLD-MCNC: 101 MG/DL (ref 70–105)
HCT VFR BLD AUTO: 41.9 % (ref 35–47)
HDLC SERPL-MCNC: 86 MG/DL (ref 23–92)
HGB BLD-MCNC: 14.1 G/DL (ref 11.7–15.7)
IMM GRANULOCYTES # BLD: 0 10E3/UL
IMM GRANULOCYTES NFR BLD: 0 %
LDLC SERPL CALC-MCNC: 155 MG/DL
LYMPHOCYTES # BLD AUTO: 1 10E3/UL (ref 0.8–5.3)
LYMPHOCYTES NFR BLD AUTO: 24 %
MCH RBC QN AUTO: 32.6 PG (ref 26.5–33)
MCHC RBC AUTO-ENTMCNC: 33.7 G/DL (ref 31.5–36.5)
MCV RBC AUTO: 97 FL (ref 78–100)
MONOCYTES # BLD AUTO: 0.4 10E3/UL (ref 0–1.3)
MONOCYTES NFR BLD AUTO: 11 %
NEUTROPHILS # BLD AUTO: 2.3 10E3/UL (ref 1.6–8.3)
NEUTROPHILS NFR BLD AUTO: 59 %
NONHDLC SERPL-MCNC: 170 MG/DL
NRBC # BLD AUTO: 0 10E3/UL
NRBC BLD AUTO-RTO: 0 /100
PHOSPHATE SERPL-MCNC: 3.2 MG/DL (ref 2.5–5)
PLATELET # BLD AUTO: 272 10E3/UL (ref 150–450)
POTASSIUM BLD-SCNC: 4.2 MMOL/L (ref 3.5–5.1)
PROT SERPL-MCNC: 7.1 G/DL (ref 6.4–8.9)
PTH-INTACT SERPL-MCNC: 48 PG/ML (ref 12–88)
RBC # BLD AUTO: 4.33 10E6/UL (ref 3.8–5.2)
SODIUM SERPL-SCNC: 138 MMOL/L (ref 134–144)
TOTAL PROTEIN SERUM FOR ELP: 7.3 G/DL (ref 6.8–8.8)
TRIGL SERPL-MCNC: 77 MG/DL
WBC # BLD AUTO: 4 10E3/UL (ref 4–11)

## 2022-01-11 PROCEDURE — 74177 CT ABD & PELVIS W/CONTRAST: CPT

## 2022-01-11 PROCEDURE — 99214 OFFICE O/P EST MOD 30 MIN: CPT | Performed by: NURSE PRACTITIONER

## 2022-01-11 PROCEDURE — 83970 ASSAY OF PARATHORMONE: CPT | Mod: ZL

## 2022-01-11 PROCEDURE — 84450 TRANSFERASE (AST) (SGOT): CPT | Mod: ZL

## 2022-01-11 PROCEDURE — 82306 VITAMIN D 25 HYDROXY: CPT | Mod: ZL

## 2022-01-11 PROCEDURE — 84155 ASSAY OF PROTEIN SERUM: CPT | Mod: ZL,XU

## 2022-01-11 PROCEDURE — G0463 HOSPITAL OUTPT CLINIC VISIT: HCPCS | Mod: 25

## 2022-01-11 PROCEDURE — 36415 COLL VENOUS BLD VENIPUNCTURE: CPT | Mod: ZL

## 2022-01-11 PROCEDURE — 84165 PROTEIN E-PHORESIS SERUM: CPT | Mod: TC,ZL | Performed by: PATHOLOGY

## 2022-01-11 PROCEDURE — 85652 RBC SED RATE AUTOMATED: CPT | Mod: ZL

## 2022-01-11 PROCEDURE — 84100 ASSAY OF PHOSPHORUS: CPT | Mod: ZL

## 2022-01-11 PROCEDURE — 86140 C-REACTIVE PROTEIN: CPT | Mod: ZL

## 2022-01-11 PROCEDURE — G0463 HOSPITAL OUTPT CLINIC VISIT: HCPCS

## 2022-01-11 PROCEDURE — 250N000011 HC RX IP 250 OP 636: Performed by: NURSE PRACTITIONER

## 2022-01-11 PROCEDURE — 80061 LIPID PANEL: CPT | Mod: ZL

## 2022-01-11 PROCEDURE — 85025 COMPLETE CBC W/AUTO DIFF WBC: CPT | Mod: ZL

## 2022-01-11 PROCEDURE — 82040 ASSAY OF SERUM ALBUMIN: CPT | Mod: ZL

## 2022-01-11 RX ORDER — IOPAMIDOL 755 MG/ML
90 INJECTION, SOLUTION INTRAVASCULAR ONCE
Status: COMPLETED | OUTPATIENT
Start: 2022-01-11 | End: 2022-01-11

## 2022-01-11 RX ADMIN — IOPAMIDOL 90 ML: 755 INJECTION, SOLUTION INTRAVENOUS at 13:15

## 2022-01-11 ASSESSMENT — PAIN SCALES - GENERAL: PAINLEVEL: NO PAIN (0)

## 2022-01-11 NOTE — NURSING NOTE
Chief Complaint   Patient presents with     Abdominal Pain     RLQ pain 4-5 days         Medication Reconciliation: complete    Stella Hernandez, LPN
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
## 2022-01-12 LAB
ALBUMIN SERPL ELPH-MCNC: 4.4 G/DL (ref 3.7–5.1)
ALPHA1 GLOB SERPL ELPH-MCNC: 0.3 G/DL (ref 0.2–0.4)
ALPHA2 GLOB SERPL ELPH-MCNC: 0.8 G/DL (ref 0.5–0.9)
B-GLOBULIN SERPL ELPH-MCNC: 0.8 G/DL (ref 0.6–1)
GAMMA GLOB SERPL ELPH-MCNC: 1.1 G/DL (ref 0.7–1.6)
M PROTEIN SERPL ELPH-MCNC: 0 G/DL
PROT PATTERN SERPL ELPH-IMP: NORMAL

## 2022-01-12 PROCEDURE — 84165 PROTEIN E-PHORESIS SERUM: CPT | Mod: 26 | Performed by: PATHOLOGY

## 2022-01-24 ENCOUNTER — ALLIED HEALTH/NURSE VISIT (OUTPATIENT)
Dept: FAMILY MEDICINE | Facility: OTHER | Age: 82
End: 2022-01-24
Attending: FAMILY MEDICINE
Payer: MEDICARE

## 2022-01-24 DIAGNOSIS — R09.81 CONGESTION OF NASAL SINUS: Primary | ICD-10-CM

## 2022-01-24 DIAGNOSIS — R51.9 HEADACHE, UNSPECIFIED: ICD-10-CM

## 2022-01-24 PROCEDURE — C9803 HOPD COVID-19 SPEC COLLECT: HCPCS

## 2022-01-24 PROCEDURE — U0005 INFEC AGEN DETEC AMPLI PROBE: HCPCS | Mod: ZL

## 2022-01-24 NOTE — PROGRESS NOTES
Patient here for a Covid Testing. Congestion and headache.    Quin Stoner MA on 1/24/2022 at 9:42 AM

## 2022-01-25 LAB — SARS-COV-2 RNA RESP QL NAA+PROBE: NOT DETECTED

## 2022-03-08 ENCOUNTER — HOSPITAL ENCOUNTER (OUTPATIENT)
Dept: MAMMOGRAPHY | Facility: OTHER | Age: 82
End: 2022-03-08
Attending: NURSE PRACTITIONER
Payer: MEDICARE

## 2022-03-08 ENCOUNTER — OFFICE VISIT (OUTPATIENT)
Dept: INTERNAL MEDICINE | Facility: OTHER | Age: 82
End: 2022-03-08
Attending: NURSE PRACTITIONER
Payer: MEDICARE

## 2022-03-08 ENCOUNTER — HOSPITAL ENCOUNTER (OUTPATIENT)
Dept: ULTRASOUND IMAGING | Facility: OTHER | Age: 82
End: 2022-03-08
Attending: NURSE PRACTITIONER
Payer: MEDICARE

## 2022-03-08 VITALS
SYSTOLIC BLOOD PRESSURE: 126 MMHG | TEMPERATURE: 97.6 F | OXYGEN SATURATION: 96 % | RESPIRATION RATE: 16 BRPM | WEIGHT: 147.4 LBS | BODY MASS INDEX: 24.53 KG/M2 | HEART RATE: 84 BPM | DIASTOLIC BLOOD PRESSURE: 78 MMHG

## 2022-03-08 DIAGNOSIS — Z12.31 VISIT FOR SCREENING MAMMOGRAM: ICD-10-CM

## 2022-03-08 DIAGNOSIS — L71.9 ROSACEA: ICD-10-CM

## 2022-03-08 DIAGNOSIS — M47.812 SPONDYLOSIS, CERVICAL: ICD-10-CM

## 2022-03-08 DIAGNOSIS — G44.209 TENSION HEADACHE: ICD-10-CM

## 2022-03-08 DIAGNOSIS — R06.02 SHORTNESS OF BREATH: ICD-10-CM

## 2022-03-08 DIAGNOSIS — N63.20 LEFT BREAST LUMP: ICD-10-CM

## 2022-03-08 DIAGNOSIS — N63.24 UNSPECIFIED LUMP IN THE LEFT BREAST, LOWER INNER QUADRANT: ICD-10-CM

## 2022-03-08 DIAGNOSIS — N18.31 STAGE 3A CHRONIC KIDNEY DISEASE (H): ICD-10-CM

## 2022-03-08 DIAGNOSIS — I63.9 CEREBROVASCULAR ACCIDENT (CVA), UNSPECIFIED MECHANISM (H): ICD-10-CM

## 2022-03-08 DIAGNOSIS — M05.79 SEROPOSITIVE RHEUMATOID ARTHRITIS OF MULTIPLE SITES (H): Primary | ICD-10-CM

## 2022-03-08 DIAGNOSIS — M35.00 SJOGREN'S SYNDROME, WITH UNSPECIFIED ORGAN INVOLVEMENT (H): ICD-10-CM

## 2022-03-08 DIAGNOSIS — Z00.00 ENCOUNTER FOR MEDICARE ANNUAL WELLNESS EXAM: ICD-10-CM

## 2022-03-08 PROBLEM — E78.49 OTHER HYPERLIPIDEMIA: Status: ACTIVE | Noted: 2021-09-02

## 2022-03-08 PROCEDURE — G0439 PPPS, SUBSEQ VISIT: HCPCS | Performed by: NURSE PRACTITIONER

## 2022-03-08 PROCEDURE — 77062 BREAST TOMOSYNTHESIS BI: CPT

## 2022-03-08 PROCEDURE — 76642 ULTRASOUND BREAST LIMITED: CPT | Mod: LT

## 2022-03-08 PROCEDURE — G0463 HOSPITAL OUTPT CLINIC VISIT: HCPCS

## 2022-03-08 RX ORDER — METRONIDAZOLE 7.5 MG/G
GEL TOPICAL DAILY
Qty: 45 G | Refills: 3 | Status: SHIPPED | OUTPATIENT
Start: 2022-03-08 | End: 2024-02-20

## 2022-03-08 RX ORDER — EZETIMIBE 10 MG/1
10 TABLET ORAL DAILY
Qty: 30 TABLET | Refills: 3 | Status: SHIPPED | OUTPATIENT
Start: 2022-03-08 | End: 2023-02-28

## 2022-03-08 ASSESSMENT — ACTIVITIES OF DAILY LIVING (ADL): CURRENT_FUNCTION: NO ASSISTANCE NEEDED

## 2022-03-08 ASSESSMENT — PAIN SCALES - GENERAL: PAINLEVEL: MILD PAIN (2)

## 2022-03-08 ASSESSMENT — ENCOUNTER SYMPTOMS
UNEXPECTED WEIGHT CHANGE: 0
TROUBLE SWALLOWING: 0
SLEEP DISTURBANCE: 0
HEADACHES: 1
COUGH: 0
SHORTNESS OF BREATH: 1
ADENOPATHY: 0
WHEEZING: 0
DIFFICULTY URINATING: 0
NAUSEA: 0
DYSPHORIC MOOD: 0
HEMATURIA: 0
ABDOMINAL PAIN: 0
PALPITATIONS: 0
ARTHRALGIAS: 1
DYSURIA: 0
WEAKNESS: 0
FATIGUE: 0
ANAL BLEEDING: 0
FEVER: 0
HEMATOCHEZIA: 0
MYALGIAS: 1

## 2022-03-08 ASSESSMENT — PATIENT HEALTH QUESTIONNAIRE - PHQ9: SUM OF ALL RESPONSES TO PHQ QUESTIONS 1-9: 4

## 2022-03-08 NOTE — PROGRESS NOTES
"Jacklyn Albarado is a 81 year old who presents for the following health issues medicare wellness    Pain  Associated symptoms include arthralgias, headaches, myalgias and a rash. Pertinent negatives include no abdominal pain, chest pain, coughing, fatigue, fever, nausea or weakness.   Healthy Habits:     In general, how would you rate your overall health?  Fair    Frequency of exercise:  6-7 days/week    Duration of exercise:  30-45 minutes    Do you usually eat at least 4 servings of fruit and vegetables a day, include whole grains    & fiber and avoid regularly eating high fat or \"junk\" foods?  Yes    Taking medications regularly:  Yes    Ability to successfully perform activities of daily living:  No assistance needed    Home Safety:  No safety concerns identified    Hearing Impairment:  Difficulty understanding soft or whispered speech    In the past 6 months, have you been bothered by leaking of urine?  No    In general, how would you rate your overall mental or emotional health?  Good      PHQ-2 Total Score: 1    Additional concerns today:  Yes     Eye exam: 2022  Dental Exam:  May 2022    Annual Wellness Visit    Are you in the first 12 months of your Medicare Part B coverage?  No    Physical Health:  PHQ-2 Score: (P) 2    Do you feel safe in your environment? Yes    Have you ever done Advance Care Planning? (For example, a Health Directive, POLST, or a discussion with a medical provider or your loved ones about your wishes)? Yes, advance care planning is on file.    Fall risk:  Fallen 2 or more times in the past year?: No  Any fall with injury in the past year?: No    Cognitive Screenin) Repeat 3 items (Leader, Season, Table)    2) Clock draw: NORMAL  3) 3 item recall: Recalls 3 objects  Results: 3 items recalled: COGNITIVE IMPAIRMENT LESS LIKELY    Mini-CogTM Copyright [employer] [place]. Licensed by the author for use in [facility]; reprinted with permission ([email or web address]). All " rights reserved.      Do you have sleep apnea, excessive snoring or daytime drowsiness?: no    Current providers sharing in care for this patient include:   Patient Care Team:  Kaia Quinn NP as PCP - General (Nurse Practitioner)  Kaia Quinn NP as Assigned PCP  Mak Reagan,  as Assigned Heart and Vascular Provider    Patient has been advised of split billing requirements and indicates understanding: Yes    Patient reports that for a few months now she has had pain across her both shoulders right greater than left, pain at the right sacrum and also tightness of her neck muscles and a headache that is like a band squeezing across her forehead.  She has a chronic rash on her cheeks that extends across to her nose.  She had been on prednisone but that was stopped by her rheumatologist several months ago.  She had labs for rheumatology in January and everything returned normal.  She has an appointment with rheumatologist next week.  The symptoms of the shoulder, sacral and neck pain started between 1-2 months ago.  She is not sure if it started after she had a rheumatology labs.  She states that the rash on her face is constant.  Some days worse than others.  Sometimes she can feel it burn.  It has been there intermittently for many years.  She does not use any type of creams.  Also states that she has had some shortness of breath for about 3 months.  She continues to be active with her bone builders and a walking program.  She was seen by cardiology last year as she had history of CVA.  She has had a normal echocardiogram and she was scheduled to have a Lexiscan but patient canceled because she did not want to be injected with any dye and because a friend told her that it gave her the worst headache of her life.    Review of Systems   Constitutional: Negative for fatigue, fever and unexpected weight change.   HENT: Negative for mouth sores and trouble swallowing.    Respiratory: Positive  for shortness of breath. Negative for cough and wheezing.         Shortness of breath sometimes during walking but she does not have to rest or stop to catch her breath   Cardiovascular: Negative for chest pain and palpitations.   Gastrointestinal: Negative for abdominal pain, anal bleeding, hematochezia and nausea.   Breasts:  Positive for tenderness.        Reports she has a tender spot on the lower right breast.  She scheduled herself a mammogram and has that scheduled this morning.  She did not notify radiology scheduling of the tenderness when she scheduled the appointment   Genitourinary: Negative for difficulty urinating, dysuria, hematuria and vaginal bleeding.   Musculoskeletal: Positive for arthralgias and myalgias. Negative for gait problem.        See HPI   Skin: Positive for rash.   Neurological: Positive for headaches. Negative for syncope and weakness.   Hematological: Negative for adenopathy.   Psychiatric/Behavioral: Negative for dysphoric mood and sleep disturbance.            Objective    /78 (BP Location: Right arm, Patient Position: Sitting, Cuff Size: Adult Regular)   Pulse 84   Temp 97.6  F (36.4  C) (Tympanic)   Resp 16   Wt 66.9 kg (147 lb 6.4 oz)   SpO2 96%   BMI 24.53 kg/m    Body mass index is 24.53 kg/m .  Physical Exam   Pleasant female no acute distress.  Affect normal.  Alert and oriented x4.  Skin color pink.  Sclera nonicteric.  TMs clear.  Oral mucosa pink and moist.  Throat without erythema.  Skin along cheeks and nares pink in color.  No pustular lesions.  Neck supple without adenopathy.  No thyromegaly.  Lung fields clear to auscultation.  Cardiovascular regular rate and rhythm with no murmur or S3.  Abdomen soft and without masses, tenderness and organomegaly.  No axillary or inguinal adenopathy.  Bilateral breast without masses, swelling or rashes.  She points to the lower mammary ridge of the left breast as being the area of discomfort.  Unable to palpate any  abnormalities.  This is an area where her sports bra band lies.  Extremities with trace edema.  There is tenderness with palpation to the bilateral trapezius right greater than left.  Muscles in this region are tight.  No pain with palpation to the occiputs or the cervical spine.        ICD-10-CM    1. Seropositive rheumatoid arthritis of multiple sites (H)  M05.79    2. Sjogren's syndrome, with unspecified organ involvement (H)  M35.00    3. Stage 3a chronic kidney disease (H)  N18.31    4. Spondylosis, cervical  M47.812    5. Cerebrovascular accident (CVA), unspecified mechanism (H)  I63.9 ezetimibe (ZETIA) 10 MG tablet   6. Rosacea  L71.9 metroNIDAZOLE (METROGEL) 0.75 % external gel   7. Tension headache  G44.209    8. Shortness of breath  R06.02      Plan: In January patient had complete lab work.  These labs are reviewed and discussed.  She will keep appointment with rheumatology.  She did have normal sed rate and CRP.  These labs may need to be rechecked by her rheumatologist with her reports of bilateral shoulder and right pelvic discomfort.  She has history of seropositive rheumatoid arthritis and Sjogren's.  Upon examination today it appears as though she has rosacea.  She will be started on MetroGel 0.75%.  She will start this out twice weekly increasing to 3 times weekly then to daily if helping and tolerated.  She has symptoms suggestive of tension headache.  She will do gentle neck range of motion.  If this is not improving symptoms then can refer her to physical therapy.  In regards to the shortness of breath.  She has had a normal echocardiogram and other cardiac work-up.  She canceled the Lexiscan stress test.  I reviewed and discussed risk and benefits and procedure of Lexiscan stress test with patient, she will consider and let me know if she wants to have this ordered.  Discussed this would help us to determine if she is having dyspnea secondary to cardiac ischemia.  She expresses understanding.   She does have history of CVA and hyperlipidemia.  She is intolerant to many statins that she has tried.  Will try her on Zetia 10 mg daily.  Also is due for Shingrix, Tdap.  She will check with local pharmacy.  She has been vaccinated against COVID-19 including booster.          The patient was provided with suggestions to help her develop a healthy physical lifestyle.  The patient was provided with written information regarding signs of hearing loss.

## 2022-03-08 NOTE — PATIENT INSTRUCTIONS
Patient Education   Personalized Prevention Plan  You are due for the preventive services outlined below.  Your care team is available to assist you in scheduling these services.  If you have already completed any of these items, please share that information with your care team to update in your medical record.  Health Maintenance Due   Topic Date Due     Zoster (Shingles) Vaccine (1 of 2) Never done     Diptheria Tetanus Pertussis (DTAP/TDAP/TD) Vaccine (1 - Tdap) 06/15/2005     Your Health Risk Assessment indicates you feel you are not in good health    A healthy lifestyle helps keep the body fit and the mind alert. It helps protect you from disease, helps you fight disease, and helps prevent chronic disease (disease that doesn't go away) from getting worse. This is important as you get older and begin to notice twinges in muscles and joints and a decline in the strength and stamina you once took for granted. A healthy lifestyle includes good healthcare, good nutrition, weight control, recreation, and regular exercise. Avoid harmful substances and do what you can to keep safe. Another part of a healthy lifestyle is stay mentally active and socially involved.    Good healthcare     Have a wellness visit every year.     If you have new symptoms, let us know right away. Don't wait until the next checkup.     Take medicines exactly as prescribed and keep your medicines in a safe place. Tell us if your medicine causes problems.   Healthy diet and weight control     Eat 3 or 4 small, nutritious, low-fat, high-fiber meals a day. Include a variety of fruits, vegetables, and whole-grain foods.     Make sure you get enough calcium in your diet. Calcium, vitamin D, and exercise help prevent osteoporosis (bone thinning).     If you live alone, try eating with others when you can. That way you get a good meal and have company while you eat it.     Try to keep a healthy weight. If you eat more calories than your body uses  for energy, it will be stored as fat and you will gain weight.     Recreation   Recreation is not limited to sports and team events. It includes any activity that provides relaxation, interest, enjoyment, and exercise. Recreation provides an outlet for physical, mental, and social energy. It can give a sense of worth and achievement. It can help you stay healthy.    Mental Exercise and Social Involvement  Mental and emotional health is as important as physical health. Keep in touch with friends and family. Stay as active as possible. Continue to learn and challenge yourself.   Things you can do to stay mentally active are:    Learn something new, like a foreign language or musical instrument.     Play SCRABBLE or do crossword puzzles. If you cannot find people to play these games with you at home, you can play them with others on your computer through the Internet.     Join a games club--anything from card games to chess or checkers or lawn bowling.     Start a new hobby.     Go back to school.     Volunteer.     Read.   Keep up with world events.    Signs of Hearing Loss      Hearing much better with one ear can be a sign of hearing loss.   Hearing loss is a problem shared by many people. In fact, it is one of the most common health problems, particularly as people age. Most people age 65 and older have some hearing loss. By age 80, almost everyone does. Hearing loss often occurs slowly over the years. So you may not realize your hearing has gotten worse.  Have your hearing checked  Call your healthcare provider if you:    Have to strain to hear normal conversation    Have to watch other people s faces very carefully to follow what they re saying    Need to ask people to repeat what they ve said    Often misunderstand what people are saying    Turn the volume of the television or radio up so high that others complain    Feel that people are mumbling when they re talking to you    Find that the effort to hear leaves  you feeling tired and irritated    Notice, when using the phone, that you hear better with one ear than the other  [employer] last reviewed this educational content on 1/1/2020 2000-2021 [employer]. All rights reserved. This information is not intended as a substitute for professional medical care. Always follow your healthcare professional's instructions.

## 2022-03-08 NOTE — NURSING NOTE
"Chief Complaint   Patient presents with     Medicare Visit     Pain     Headache, shoulder, low back pain, right hip down leg       FOOD SECURITY SCREENING QUESTIONS  Hunger Vital Signs:  Within the past 12 months we worried whether our food would run out before we got money to buy more. Never  Within the past 12 months the food we bought just didn't last and we didn't have money to get more. Never  Chayito Azul LPN 3/8/2022 8:25 AM      Initial /78 (BP Location: Right arm, Patient Position: Sitting, Cuff Size: Adult Regular)   Pulse 84   Temp 97.6  F (36.4  C) (Tympanic)   Resp 16   Wt 66.9 kg (147 lb 6.4 oz)   SpO2 96%   BMI 24.53 kg/m   Estimated body mass index is 24.53 kg/m  as calculated from the following:    Height as of 9/2/21: 1.651 m (5' 5\").    Weight as of this encounter: 66.9 kg (147 lb 6.4 oz).  Medication Reconciliation: complete    Chayito Azul LPN  "

## 2022-03-16 ENCOUNTER — TRANSFERRED RECORDS (OUTPATIENT)
Dept: HEALTH INFORMATION MANAGEMENT | Facility: OTHER | Age: 82
End: 2022-03-16
Payer: COMMERCIAL

## 2022-03-17 ENCOUNTER — HOSPITAL ENCOUNTER (OUTPATIENT)
Dept: MAMMOGRAPHY | Facility: OTHER | Age: 82
Discharge: HOME | End: 2022-03-17
Attending: NURSE PRACTITIONER
Payer: MEDICARE

## 2022-03-17 VITALS — DIASTOLIC BLOOD PRESSURE: 70 MMHG | SYSTOLIC BLOOD PRESSURE: 118 MMHG | RESPIRATION RATE: 14 BRPM | HEART RATE: 90 BPM

## 2022-03-17 DIAGNOSIS — R92.1 BREAST CALCIFICATION, RIGHT: ICD-10-CM

## 2022-03-17 DIAGNOSIS — N63.10 BREAST MASS, RIGHT: ICD-10-CM

## 2022-03-17 DIAGNOSIS — R92.8 ABNORMAL FINDING ON BREAST IMAGING: ICD-10-CM

## 2022-03-17 PROCEDURE — 76098 X-RAY EXAM SURGICAL SPECIMEN: CPT

## 2022-03-17 PROCEDURE — 999N000065 MA POST PROCEDURE RIGHT

## 2022-03-17 PROCEDURE — 19081 BX BREAST 1ST LESION STRTCTC: CPT | Mod: RT

## 2022-03-17 PROCEDURE — 250N000009 HC RX 250: Performed by: STUDENT IN AN ORGANIZED HEALTH CARE EDUCATION/TRAINING PROGRAM

## 2022-03-17 PROCEDURE — 88360 TUMOR IMMUNOHISTOCHEM/MANUAL: CPT

## 2022-03-17 PROCEDURE — 88305 TISSUE EXAM BY PATHOLOGIST: CPT

## 2022-03-17 RX ORDER — LIDOCAINE HYDROCHLORIDE AND EPINEPHRINE 10; 10 MG/ML; UG/ML
20 INJECTION, SOLUTION INFILTRATION; PERINEURAL ONCE
Status: COMPLETED | OUTPATIENT
Start: 2022-03-17 | End: 2022-03-17

## 2022-03-17 RX ORDER — LIDOCAINE HYDROCHLORIDE 10 MG/ML
10 INJECTION, SOLUTION EPIDURAL; INFILTRATION; INTRACAUDAL; PERINEURAL ONCE
Status: COMPLETED | OUTPATIENT
Start: 2022-03-17 | End: 2022-03-17

## 2022-03-17 RX ADMIN — LIDOCAINE HYDROCHLORIDE 4 ML: 10 INJECTION, SOLUTION INFILTRATION; PERINEURAL at 08:31

## 2022-03-17 RX ADMIN — LIDOCAINE HYDROCHLORIDE,EPINEPHRINE BITARTRATE 20 ML: 10; .01 INJECTION, SOLUTION INFILTRATION; PERINEURAL at 08:33

## 2022-03-17 NOTE — DISCHARGE INSTRUCTIONS
"NEEDLE BIOPSY BREAST    Activity: Rest the remainder of the day. You may resume normal activity after the next day. Avoid any vigorous/strenuous physical activity for 24 hours.    Comfort: If you have discomfort or tenderness at the site you may take your usual or recommended pain medication. Do not take aspirin the day of the procedure or for 48 hours following the biopsy.    Diet: You may resume your usual diet.    Care of site: Leave ice pack in place for 4 hours, or until it is no longer cold. The ice pack is reusable and may be refrozen.  Keep your bra and the dressing on for 24 hours. Then you may remove the bandage and shower. If there are steri-strips you may remove them in 3 to 5 days.  You may have some discomfort and a small amount of bruising where the biopsy was performed. This is normal. For several days or even a couple of weeks, you may have tenderness or \"twinges\" and a tiny bump where the needle went into the skin. This can be bothersome, but is not abnormal. You can use warm moist washcloths, as this may help. Do Not Use A Heating Pad.    RETURN TO THE EMERGENCY ROOM FOR:   Shortness of breath   Rapid heart rate   If pain becomes worse    Call Your Doctor For:    A fever over 101 degrees   Increased redness, increased swelling, and/or persistent drainage/discomfort  around the site    Other: At the end of your breast biopsy, a tiny titanium clip will be inserted through the biopsy needle and placed at the biopsy site within your breast. The marker provides a landmark of the biopsy for further mammograms or surgical procedures. This marker is MRI compatible and poses no known health risks.    You will be receiving a letter in the mail from [facility] Mammography Department with your biopsy results.  In 6 months a mammogram will be needed to establish a new baseline and to recheck the area where the biopsy occurred. Our radiology department will call you to schedule an appointment.    For " "questions, problems or concerns, contact the Radiology Department at [phone].    NEEDLE BIOPSY BREAST    Activity: Rest the remainder of the day. You may resume normal activity after the next day. Avoid any vigorous/strenuous physical activity for 24 hours.    Comfort: If you have discomfort or tenderness at the site you may take your usual or recommended pain medication. Do not take aspirin the day of the procedure or for 48 hours following the biopsy.    Diet: You may resume your usual diet.    Care of site: Leave ice pack in place for 4 hours, or until it is no longer cold. The ice pack is reusable and may be refrozen.  Keep your bra and the dressing on for 24 hours. Then you may remove the bandage and shower. If there are steri-strips you may remove them in 3 to 5 days.  You may have some discomfort and a small amount of bruising where the biopsy was performed. This is normal. For several days or even a couple of weeks, you may have tenderness or \"twinges\" and a tiny bump where the needle went into the skin. This can be bothersome, but is not abnormal. You can use warm moist washcloths, as this may help. Do Not Use A Heating Pad.    RETURN TO THE EMERGENCY ROOM FOR:   Shortness of breath   Rapid heart rate   If pain becomes worse    Call Your Doctor For:    A fever over 101 degrees   Increased redness, increased swelling, and/or persistent drainage/discomfort  around the site    Other: At the end of your breast biopsy, a tiny titanium clip will be inserted through the biopsy needle and placed at the biopsy site within your breast. The marker provides a landmark of the biopsy for further mammograms or surgical procedures. This marker is MRI compatible and poses no known health risks.    You will be receiving a letter in the mail from [facility] Mammography Department with your biopsy results.  In 6 months a mammogram will be needed to establish a new baseline and to recheck the area where the biopsy occurred. " Our radiology department will call you to schedule an appointment.    For questions, problems or concerns, contact the Radiology Department at [phone].

## 2022-03-17 NOTE — PROGRESS NOTES
Patient here for stereotactic guided biopsy of right breast.  Procedure reviewed with patient by writer and radiologist, questions answered.  Time out performed prior to biopsy.  Biopsy completed by radiologist, clip placed.  Pressure held to biopsy site for 10 minutes.  Medipore dressing and steristrips applied.   Post clip mammogram completed.  Discharge instructions reviewed with patient, patient verbalizes understanding of instructions.  Discharged to home in stable condition with no evidence of bleeding from biopsy site.   Ruby Mckinley RN.

## 2022-03-18 ENCOUNTER — TELEPHONE (OUTPATIENT)
Dept: SURGERY | Facility: OTHER | Age: 82
End: 2022-03-18
Payer: COMMERCIAL

## 2022-03-18 LAB
PATH REPORT.COMMENTS IMP SPEC: NORMAL
PATH REPORT.FINAL DX SPEC: NORMAL
PHOTO IMAGE: NORMAL

## 2022-03-18 NOTE — TELEPHONE ENCOUNTER
Called patient to check on status post stereotactic breast biopsy.  Patient reports pain 0/10- does have a slight headache.    Patient reports no bleeding.  Patient verbalizes understanding of importance of attending results appointment with Dr. Cifuentes on 3/22 at 1130.  Ruby Mckinley RN.

## 2022-03-22 ENCOUNTER — OFFICE VISIT (OUTPATIENT)
Dept: SURGERY | Facility: OTHER | Age: 82
End: 2022-03-22
Attending: SURGERY
Payer: COMMERCIAL

## 2022-03-22 ENCOUNTER — PATIENT OUTREACH (OUTPATIENT)
Dept: ONCOLOGY | Facility: CLINIC | Age: 82
End: 2022-03-22
Payer: COMMERCIAL

## 2022-03-22 VITALS
RESPIRATION RATE: 16 BRPM | OXYGEN SATURATION: 96 % | SYSTOLIC BLOOD PRESSURE: 120 MMHG | WEIGHT: 159 LBS | HEART RATE: 81 BPM | BODY MASS INDEX: 26.46 KG/M2 | TEMPERATURE: 98.1 F | DIASTOLIC BLOOD PRESSURE: 86 MMHG

## 2022-03-22 DIAGNOSIS — Z01.818 PRE-OP TESTING: ICD-10-CM

## 2022-03-22 DIAGNOSIS — Z80.3 FAMILY HISTORY OF MALIGNANT NEOPLASM OF BREAST: ICD-10-CM

## 2022-03-22 DIAGNOSIS — D05.11 DUCTAL CARCINOMA IN SITU (DCIS) OF RIGHT BREAST: Primary | ICD-10-CM

## 2022-03-22 PROCEDURE — G0463 HOSPITAL OUTPT CLINIC VISIT: HCPCS

## 2022-03-22 PROCEDURE — 99204 OFFICE O/P NEW MOD 45 MIN: CPT | Performed by: SURGERY

## 2022-03-22 RX ORDER — CEFAZOLIN SODIUM 2 G/100ML
2 INJECTION, SOLUTION INTRAVENOUS
Status: CANCELLED | OUTPATIENT
Start: 2022-03-22

## 2022-03-22 RX ORDER — HYDROXYCHLOROQUINE SULFATE 200 MG/1
1.5 TABLET, FILM COATED ORAL DAILY
COMMUNITY
Start: 2022-03-17 | End: 2023-02-28

## 2022-03-22 RX ORDER — CEFAZOLIN SODIUM 2 G/100ML
2 INJECTION, SOLUTION INTRAVENOUS SEE ADMIN INSTRUCTIONS
Status: CANCELLED | OUTPATIENT
Start: 2022-03-22

## 2022-03-22 ASSESSMENT — PAIN SCALES - GENERAL: PAINLEVEL: MILD PAIN (2)
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
## 2022-03-22 NOTE — NURSING NOTE
"Chief Complaint   Patient presents with     Consult     right breast mass       Initial /86 (BP Location: Right arm, Patient Position: Sitting, Cuff Size: Adult Regular)   Pulse 81   Temp 98.1  F (36.7  C) (Tympanic)   Resp 16   Wt 72.1 kg (159 lb)   SpO2 96%   BMI 26.46 kg/m   Estimated body mass index is 26.46 kg/m  as calculated from the following:    Height as of 9/2/21: 1.651 m (5' 5\").    Weight as of this encounter: 72.1 kg (159 lb).  Medication Reconciliation: complete    At what age did you start menopause? 50's  What age did your menstrual cycle start? 13  Are you on or have you ever taken any hormone replacement or birth control? yes  How many children do you have? 3  How old were you when your first child was born? 28  Did you breast feed? yes  Do you have a family history of breast cancer? Yes, 2 sisters and maternal aunt  Diana Govea LPN..........3/22/2022  11:52 AM    "

## 2022-03-23 ENCOUNTER — TELEPHONE (OUTPATIENT)
Dept: SURGERY | Facility: OTHER | Age: 82
End: 2022-03-23
Payer: COMMERCIAL

## 2022-03-23 NOTE — TELEPHONE ENCOUNTER
Called patient to check on status post DCIS results yesterday.  Patient reports no questions at this time and is happy with the plan she is proceeding with.  Will call with any questions or concerns.

## 2022-03-28 ENCOUNTER — ALLIED HEALTH/NURSE VISIT (OUTPATIENT)
Dept: FAMILY MEDICINE | Facility: OTHER | Age: 82
End: 2022-03-28
Attending: SURGERY
Payer: MEDICARE

## 2022-03-28 DIAGNOSIS — Z01.818 PRE-OP TESTING: ICD-10-CM

## 2022-03-28 PROCEDURE — U0005 INFEC AGEN DETEC AMPLI PROBE: HCPCS | Mod: ZL

## 2022-03-28 PROCEDURE — C9803 HOPD COVID-19 SPEC COLLECT: HCPCS

## 2022-03-28 NOTE — PROGRESS NOTES
Patient here today for Covid test. Procedure on 3/30.     Zarina Thomas CNA .............................on 3/28/2022 at 10:33 AM

## 2022-03-29 ENCOUNTER — ANESTHESIA EVENT (OUTPATIENT)
Dept: SURGERY | Facility: OTHER | Age: 82
End: 2022-03-29
Payer: MEDICARE

## 2022-03-29 LAB — SARS-COV-2 RNA RESP QL NAA+PROBE: NEGATIVE

## 2022-03-30 ENCOUNTER — ANESTHESIA (OUTPATIENT)
Dept: SURGERY | Facility: OTHER | Age: 82
End: 2022-03-30
Payer: MEDICARE

## 2022-03-30 ENCOUNTER — HOSPITAL ENCOUNTER (OUTPATIENT)
Dept: MAMMOGRAPHY | Facility: OTHER | Age: 82
Discharge: HOME | End: 2022-03-30
Attending: SURGERY | Admitting: SURGERY
Payer: MEDICARE

## 2022-03-30 ENCOUNTER — HOSPITAL ENCOUNTER (OUTPATIENT)
Facility: OTHER | Age: 82
Discharge: HOME | End: 2022-03-30
Attending: SURGERY | Admitting: SURGERY
Payer: MEDICARE

## 2022-03-30 VITALS
OXYGEN SATURATION: 94 % | TEMPERATURE: 97.2 F | WEIGHT: 150 LBS | RESPIRATION RATE: 16 BRPM | HEART RATE: 70 BPM | BODY MASS INDEX: 24.96 KG/M2 | SYSTOLIC BLOOD PRESSURE: 136 MMHG | DIASTOLIC BLOOD PRESSURE: 71 MMHG

## 2022-03-30 DIAGNOSIS — D05.11 DUCTAL CARCINOMA IN SITU (DCIS) OF RIGHT BREAST: ICD-10-CM

## 2022-03-30 LAB — GLUCOSE BLDC GLUCOMTR-MCNC: 111 MG/DL (ref 70–99)

## 2022-03-30 PROCEDURE — 19283 PERQ DEV BREAST 1ST STRTCTC: CPT | Mod: RT,XU

## 2022-03-30 PROCEDURE — 272N000001 HC OR GENERAL SUPPLY STERILE: Performed by: SURGERY

## 2022-03-30 PROCEDURE — 258N000003 HC RX IP 258 OP 636: Performed by: NURSE ANESTHETIST, CERTIFIED REGISTERED

## 2022-03-30 PROCEDURE — 250N000009 HC RX 250: Performed by: NURSE ANESTHETIST, CERTIFIED REGISTERED

## 2022-03-30 PROCEDURE — 19301 PARTIAL MASTECTOMY: CPT | Performed by: NURSE ANESTHETIST, CERTIFIED REGISTERED

## 2022-03-30 PROCEDURE — 250N000013 HC RX MED GY IP 250 OP 250 PS 637: Performed by: SURGERY

## 2022-03-30 PROCEDURE — 250N000011 HC RX IP 250 OP 636: Performed by: NURSE ANESTHETIST, CERTIFIED REGISTERED

## 2022-03-30 PROCEDURE — 99100 ANES PT EXTEME AGE<1 YR&>70: CPT | Performed by: NURSE ANESTHETIST, CERTIFIED REGISTERED

## 2022-03-30 PROCEDURE — 250N000009 HC RX 250: Performed by: RADIOLOGY

## 2022-03-30 PROCEDURE — 710N000010 HC RECOVERY PHASE 1, LEVEL 2, PER MIN: Performed by: SURGERY

## 2022-03-30 PROCEDURE — 370N000017 HC ANESTHESIA TECHNICAL FEE, PER MIN: Performed by: SURGERY

## 2022-03-30 PROCEDURE — 88307 TISSUE EXAM BY PATHOLOGIST: CPT

## 2022-03-30 PROCEDURE — 76098 X-RAY EXAM SURGICAL SPECIMEN: CPT

## 2022-03-30 PROCEDURE — 82962 GLUCOSE BLOOD TEST: CPT

## 2022-03-30 PROCEDURE — 710N000012 HC RECOVERY PHASE 2, PER MINUTE: Performed by: SURGERY

## 2022-03-30 PROCEDURE — 19301 PARTIAL MASTECTOMY: CPT | Mod: RT | Performed by: SURGERY

## 2022-03-30 PROCEDURE — 250N000009 HC RX 250: Performed by: SURGERY

## 2022-03-30 PROCEDURE — 360N000075 HC SURGERY LEVEL 2, PER MIN: Performed by: SURGERY

## 2022-03-30 PROCEDURE — 250N000011 HC RX IP 250 OP 636: Performed by: SURGERY

## 2022-03-30 PROCEDURE — 999N000141 HC STATISTIC PRE-PROCEDURE NURSING ASSESSMENT: Performed by: SURGERY

## 2022-03-30 RX ORDER — PROPOFOL 10 MG/ML
INJECTION, EMULSION INTRAVENOUS PRN
Status: DISCONTINUED | OUTPATIENT
Start: 2022-03-30 | End: 2022-03-30

## 2022-03-30 RX ORDER — CEFAZOLIN SODIUM/WATER 2 G/20 ML
2 SYRINGE (ML) INTRAVENOUS
Status: COMPLETED | OUTPATIENT
Start: 2022-03-30 | End: 2022-03-30

## 2022-03-30 RX ORDER — ONDANSETRON 2 MG/ML
INJECTION INTRAMUSCULAR; INTRAVENOUS PRN
Status: DISCONTINUED | OUTPATIENT
Start: 2022-03-30 | End: 2022-03-30

## 2022-03-30 RX ORDER — SCOLOPAMINE TRANSDERMAL SYSTEM 1 MG/1
1 PATCH, EXTENDED RELEASE TRANSDERMAL
Status: DISCONTINUED | OUTPATIENT
Start: 2022-03-30 | End: 2022-03-30 | Stop reason: HOSPADM

## 2022-03-30 RX ORDER — DEXAMETHASONE SODIUM PHOSPHATE 4 MG/ML
INJECTION, SOLUTION INTRA-ARTICULAR; INTRALESIONAL; INTRAMUSCULAR; INTRAVENOUS; SOFT TISSUE PRN
Status: DISCONTINUED | OUTPATIENT
Start: 2022-03-30 | End: 2022-03-30

## 2022-03-30 RX ORDER — FENTANYL CITRATE 50 UG/ML
INJECTION, SOLUTION INTRAMUSCULAR; INTRAVENOUS PRN
Status: DISCONTINUED | OUTPATIENT
Start: 2022-03-30 | End: 2022-03-30

## 2022-03-30 RX ORDER — LIDOCAINE HYDROCHLORIDE 10 MG/ML
20 INJECTION, SOLUTION EPIDURAL; INFILTRATION; INTRACAUDAL; PERINEURAL ONCE
Status: COMPLETED | OUTPATIENT
Start: 2022-03-30 | End: 2022-03-30

## 2022-03-30 RX ORDER — PROPOFOL 10 MG/ML
INJECTION, EMULSION INTRAVENOUS CONTINUOUS PRN
Status: DISCONTINUED | OUTPATIENT
Start: 2022-03-30 | End: 2022-03-30

## 2022-03-30 RX ORDER — ACETAMINOPHEN 325 MG/1
975 TABLET ORAL ONCE
Status: COMPLETED | OUTPATIENT
Start: 2022-03-30 | End: 2022-03-30

## 2022-03-30 RX ORDER — HYDROMORPHONE HYDROCHLORIDE 1 MG/ML
0.4 INJECTION, SOLUTION INTRAMUSCULAR; INTRAVENOUS; SUBCUTANEOUS EVERY 5 MIN PRN
Status: DISCONTINUED | OUTPATIENT
Start: 2022-03-30 | End: 2022-03-30 | Stop reason: HOSPADM

## 2022-03-30 RX ORDER — NALOXONE HYDROCHLORIDE 0.4 MG/ML
0.2 INJECTION, SOLUTION INTRAMUSCULAR; INTRAVENOUS; SUBCUTANEOUS
Status: DISCONTINUED | OUTPATIENT
Start: 2022-03-30 | End: 2022-03-30 | Stop reason: HOSPADM

## 2022-03-30 RX ORDER — NALOXONE HYDROCHLORIDE 0.4 MG/ML
0.4 INJECTION, SOLUTION INTRAMUSCULAR; INTRAVENOUS; SUBCUTANEOUS
Status: DISCONTINUED | OUTPATIENT
Start: 2022-03-30 | End: 2022-03-30 | Stop reason: HOSPADM

## 2022-03-30 RX ORDER — ONDANSETRON 4 MG/1
4 TABLET, ORALLY DISINTEGRATING ORAL EVERY 30 MIN PRN
Status: DISCONTINUED | OUTPATIENT
Start: 2022-03-30 | End: 2022-03-30 | Stop reason: HOSPADM

## 2022-03-30 RX ORDER — SODIUM CHLORIDE 9 MG/ML
INJECTION, SOLUTION INTRAVENOUS CONTINUOUS
Status: DISCONTINUED | OUTPATIENT
Start: 2022-03-30 | End: 2022-03-30 | Stop reason: HOSPADM

## 2022-03-30 RX ORDER — LIDOCAINE HYDROCHLORIDE AND EPINEPHRINE 10; 10 MG/ML; UG/ML
INJECTION, SOLUTION INFILTRATION; PERINEURAL PRN
Status: DISCONTINUED | OUTPATIENT
Start: 2022-03-30 | End: 2022-03-30 | Stop reason: HOSPADM

## 2022-03-30 RX ORDER — ONDANSETRON 2 MG/ML
4 INJECTION INTRAMUSCULAR; INTRAVENOUS EVERY 30 MIN PRN
Status: DISCONTINUED | OUTPATIENT
Start: 2022-03-30 | End: 2022-03-30 | Stop reason: HOSPADM

## 2022-03-30 RX ORDER — FENTANYL CITRATE 50 UG/ML
50 INJECTION, SOLUTION INTRAMUSCULAR; INTRAVENOUS EVERY 5 MIN PRN
Status: DISCONTINUED | OUTPATIENT
Start: 2022-03-30 | End: 2022-03-30 | Stop reason: HOSPADM

## 2022-03-30 RX ORDER — CEFAZOLIN SODIUM/WATER 2 G/20 ML
2 SYRINGE (ML) INTRAVENOUS SEE ADMIN INSTRUCTIONS
Status: DISCONTINUED | OUTPATIENT
Start: 2022-03-30 | End: 2022-03-30 | Stop reason: HOSPADM

## 2022-03-30 RX ORDER — FENTANYL CITRATE 50 UG/ML
25 INJECTION, SOLUTION INTRAMUSCULAR; INTRAVENOUS
Status: DISCONTINUED | OUTPATIENT
Start: 2022-03-30 | End: 2022-03-30 | Stop reason: HOSPADM

## 2022-03-30 RX ORDER — OXYCODONE HYDROCHLORIDE 5 MG/1
5 TABLET ORAL EVERY 4 HOURS PRN
Status: DISCONTINUED | OUTPATIENT
Start: 2022-03-30 | End: 2022-03-30 | Stop reason: HOSPADM

## 2022-03-30 RX ORDER — LIDOCAINE HYDROCHLORIDE 20 MG/ML
INJECTION, SOLUTION INFILTRATION; PERINEURAL PRN
Status: DISCONTINUED | OUTPATIENT
Start: 2022-03-30 | End: 2022-03-30

## 2022-03-30 RX ORDER — LIDOCAINE 40 MG/G
CREAM TOPICAL
Status: DISCONTINUED | OUTPATIENT
Start: 2022-03-30 | End: 2022-03-30 | Stop reason: HOSPADM

## 2022-03-30 RX ORDER — MEPERIDINE HYDROCHLORIDE 50 MG/ML
12.5 INJECTION INTRAMUSCULAR; INTRAVENOUS; SUBCUTANEOUS
Status: DISCONTINUED | OUTPATIENT
Start: 2022-03-30 | End: 2022-03-30 | Stop reason: HOSPADM

## 2022-03-30 RX ADMIN — LIDOCAINE HYDROCHLORIDE 2 ML: 10 INJECTION, SOLUTION INFILTRATION; PERINEURAL at 12:15

## 2022-03-30 RX ADMIN — FENTANYL CITRATE 25 MCG: 50 INJECTION, SOLUTION INTRAMUSCULAR; INTRAVENOUS at 13:40

## 2022-03-30 RX ADMIN — SCOPALAMINE 1 PATCH: 1 PATCH, EXTENDED RELEASE TRANSDERMAL at 11:27

## 2022-03-30 RX ADMIN — PROPOFOL 30 MG: 10 INJECTION, EMULSION INTRAVENOUS at 13:40

## 2022-03-30 RX ADMIN — PROPOFOL 100 MG: 10 INJECTION, EMULSION INTRAVENOUS at 13:23

## 2022-03-30 RX ADMIN — DEXAMETHASONE SODIUM PHOSPHATE 8 MG: 4 INJECTION, SOLUTION INTRAMUSCULAR; INTRAVENOUS at 13:28

## 2022-03-30 RX ADMIN — SODIUM CHLORIDE: 9 INJECTION, SOLUTION INTRAVENOUS at 11:50

## 2022-03-30 RX ADMIN — PROPOFOL 20 MG: 10 INJECTION, EMULSION INTRAVENOUS at 13:38

## 2022-03-30 RX ADMIN — PROPOFOL 50 MG: 10 INJECTION, EMULSION INTRAVENOUS at 13:35

## 2022-03-30 RX ADMIN — ACETAMINOPHEN 975 MG: 325 TABLET ORAL at 11:29

## 2022-03-30 RX ADMIN — PROPOFOL 150 MCG/KG/MIN: 10 INJECTION, EMULSION INTRAVENOUS at 13:23

## 2022-03-30 RX ADMIN — Medication 2 G: at 13:08

## 2022-03-30 RX ADMIN — ONDANSETRON HYDROCHLORIDE 4 MG: 2 SOLUTION INTRAMUSCULAR; INTRAVENOUS at 13:28

## 2022-03-30 RX ADMIN — LIDOCAINE HYDROCHLORIDE 60 MG: 20 INJECTION, SOLUTION INFILTRATION; PERINEURAL at 13:23

## 2022-03-30 RX ADMIN — FENTANYL CITRATE 25 MCG: 50 INJECTION, SOLUTION INTRAMUSCULAR; INTRAVENOUS at 13:23

## 2022-03-30 NOTE — ANESTHESIA POSTPROCEDURE EVALUATION
Patient: Verena Wise    Procedure: Procedure(s):  LUMPECTOMY, BREAST with wire localization       Anesthesia Type:  General    Note:  Disposition: Outpatient   Postop Pain Control: Uneventful            Sign Out: Well controlled pain   PONV: No   Neuro/Psych: Uneventful            Sign Out: Acceptable/Baseline neuro status   Airway/Respiratory: Uneventful            Sign Out: Acceptable/Baseline resp. status   CV/Hemodynamics: Uneventful            Sign Out: Acceptable CV status   Other NRE: NONE   DID A NON-ROUTINE EVENT OCCUR? No           Last vitals:  Vitals Value Taken Time   /84 03/30/22 1425   Temp 97.2  F (36.2  C) 03/30/22 1425   Pulse 68 03/30/22 1426   Resp 13 03/30/22 1426   SpO2 97 % 03/30/22 1426   Vitals shown include unvalidated device data.    Electronically Signed By: SPIKE Pineda CRNA  March 30, 2022  2:54 PM
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
## 2022-03-30 NOTE — PROGRESS NOTES
Patient here for stereotactic guided wire localization of right breast.  Procedure reviewed with patient by writer and radiologist, questions answered.  Time out performed prior to procedure.  Procedure completed by radiologist.  Cradle device applied to secure wire.  Escorted back to same day surgery via wheelchair.  Into cart with siderails up, call light within reach.  Report given to Ольга MARTEL.   Ruby Mckinley RN.

## 2022-03-30 NOTE — ANESTHESIA CARE TRANSFER NOTE
Patient: Verena Wise    Procedure: Procedure(s):  LUMPECTOMY, BREAST with wire localization       Diagnosis: Ductal carcinoma in situ (DCIS) of right breast [D05.11]  Diagnosis Additional Information: No value filed.    Anesthesia Type:   General     Note:    Oropharynx: spontaneously breathing  Level of Consciousness: drowsy  Oxygen Supplementation: nasal cannula  Level of Supplemental Oxygen (L/min / FiO2): 2  Independent Airway: airway patency satisfactory and stable  Dentition: dentition unchanged  Vital Signs Stable: post-procedure vital signs reviewed and stable  Report to RN Given: handoff report given  Patient transferred to: PACU    Handoff Report: Identifed the Patient, Identified the Reponsible Provider, Reviewed the pertinent medical history, Discussed the surgical course, Reviewed Intra-OP anesthesia mangement and issues during anesthesia, Set expectations for post-procedure period and Allowed opportunity for questions and acknowledgement of understanding      Vitals:  Vitals Value Taken Time   BP     Temp     Pulse     Resp     SpO2 97 % 03/30/22 1404   Vitals shown include unvalidated device data.    Electronically Signed By: SPIKE Worley CRNA  March 30, 2022  2:04 PM

## 2022-03-30 NOTE — OP NOTE
Preoperative Diagnosis:  Right Breast Microcalcifications     Postoperative Diagnosis:  Right Breast Microcalcifications, DCIS    Procedure Planned:  Right Breast Lumpectomy, with preoperative localization    Procedure Performed: right Breast Lumpectomy, with preoperative localization     Surgeon:  Betina Cifuentes MD  Circulator: Kacey Dumont RN  Scrub Person: Meliza Lang    Anesthesia:  general/local      Specimen:  right breast tissue to pathology    Estimated Blood Loss:less than 10 mL      INDICATIONS  Please see the consultation. The patient presents with right breast microcalcifications. Previous biopsy revealed DCIS. The risks, benefits and alternatives to lumpectomy with localization for treating breast cancer/abnormalities were discussed with the patient. We specifically discussed the risks of infection, bleeding, scarring, breast deformity and the possible need for further procedures. The patient expressed understanding and questions were answered. Informed consent paperwork was completed.     DESCRIPTION OF PROCEDURE  The patient was brought to the operating room and placed in a supine position on the operating table. Appropriate monitors were attached. The patient received IV antibiotics preoperatively. After sedation was initiated, the patient was positioned, prepped and draped in the standard fashion. Time out was performed confirming the patient's identity and procedure to be performed.  The localizing wire was noted to be in position in the right breast. Local anesthetic was infiltrated in the skin and subcutaneous tissue near the planned incision. Skin incision was made sharply and carried down to the subcutaneous tissue. Flaps were created. Dissection was carried out with electocautery around the localizing wire in normal appearing tissue. Hemostasis was appropriate. Once the specimen was dissected, it was oriented with inks per protocol. Tissue was sent to radiology for specimen  mammogram. The excision cavity was irrigated with warm sterile water and excellent hemostasis was obtained using electrocautery.  Further local anesthetic was infiltrated for postop pain control. Skin edges were approximated using running Monocryl suture. Sterile dressing was applied. The patient was then awakened from anesthesia and taken to postanesthesia recovery in stable condition. All needle, sponge and instrument counts were reported as correct at the conclusion of the case. Report from radiology confirmed the area of concern in the specimen. The patient tolerated the procedure with no immediately apparent complications.

## 2022-03-30 NOTE — DISCHARGE INSTRUCTIONS
Procedure you had done: right breast lumpectomy  Your health care provider is:  Kaia Quinn  Your surgeon is Dr. Betina Cifuentes.   Please call your health care provider or surgeon at [phone] if:  - you feel you are getting worse or having an increase in problems    - fever greater than 101 degrees  - increasing shortness of breath or chest pain  - any signs of infection (increasing redness, swelling, tenderness, warmth, change in appearance, or  increased drainage)  - nausea (upset stomach) and vomiting and/or diarrhea that will not stop  - severe pain that is not relieved by medicine, rest or ice     Home care following breast surgery:  You will be discharged when you are safe to go home. Anesthesia can change judgment, reaction time and coordination for several hours after you seem back to normal. Therefore, do not operate any motorized vehicles or power tools for 24 hours after discharge.     Activity:  You should rest or do quiet activities for the rest of the day. The day after surgery you may be as active as you feel able. You may find that you require more rest than usual the first 3-4 days as your body heals. Good breast support will help to decrease pain. Wear a supportive sports bra for the first 48 hours after surgery.       Diet:  Eat small amounts frequently after arriving home. Avoid foods that are hard to digest such as heavy, sweet, spicy, or fried foods until you are sure you feel well.  Vomiting can occur after general anesthesia. If vomiting lasts more than 12 hours call your doctor.     Other:  1. Problems urinating can happen after surgery.  Please call your physician if you have any problems.  2. Some people have constipation after surgery-you can use over the counter stool softener or laxative as needed.  3. You can use ice on the area of the incision to help with pain and swelling. Apply an ice pack wrapped in a towel to the area for 10-15 minutes once an  hour.     Dressing:  Your incision was covered with Steri-strips and a bandage. The bandage can be removed and you can shower 24 hours or more after the surgery. After you shower, dry your incision gently. You may apply a clean bandage if you want to. The Steri-strips will stay on for 5-7 days.   No soaking in a bath, hot tub, pool or lake for 5 days.      Drainage:   Bleeding or drainage should be minimal.  1. If bleeding soaks the dressings, cover with another sterile dressing.  2. If bleeding continues, apply gentle steady pressure over the incision for 5 minutes.  3. If bleeding persists or there is an increased swelling of the area, call your surgeon or go to the Emergency Room.

## 2022-03-30 NOTE — INTERVAL H&P NOTE
I have reviewed the surgical (or preoperative) H&P that is linked to this encounter, and examined the patient. There are no significant changes. I discussed right breast lumpectomy with the patient. Localization has been completed.     Clinical Conditions Present on Arrival:  Clinically Significant Risk Factors Present on Admission                  # Platelet Defect: home medication list includes an antiplatelet medication

## 2022-03-31 ENCOUNTER — TELEPHONE (OUTPATIENT)
Dept: SURGERY | Facility: OTHER | Age: 82
End: 2022-03-31
Payer: COMMERCIAL

## 2022-03-31 NOTE — TELEPHONE ENCOUNTER
Called patient to check on status post surgical breast biopsy.  Patient reports pain 0/10. Patient reports no bleeding.  Patient verbalizes understanding of importance of attending results appointment with Dr. Cifuentes on 4/5.  Ruby Mckinley RN.

## 2022-04-04 LAB
PATH REPORT.COMMENTS IMP SPEC: NORMAL
PATH REPORT.FINAL DX SPEC: NORMAL
PATH REPORT.RELEVANT HX SPEC: NORMAL
PHOTO IMAGE: NORMAL

## 2022-04-05 ENCOUNTER — TELEPHONE (OUTPATIENT)
Dept: ONCOLOGY | Facility: OTHER | Age: 82
End: 2022-04-05
Payer: COMMERCIAL

## 2022-04-05 ENCOUNTER — OFFICE VISIT (OUTPATIENT)
Dept: SURGERY | Facility: OTHER | Age: 82
End: 2022-04-05
Attending: SURGERY
Payer: MEDICARE

## 2022-04-05 VITALS
RESPIRATION RATE: 16 BRPM | HEART RATE: 80 BPM | TEMPERATURE: 97.9 F | BODY MASS INDEX: 26.29 KG/M2 | WEIGHT: 158 LBS | DIASTOLIC BLOOD PRESSURE: 84 MMHG | SYSTOLIC BLOOD PRESSURE: 138 MMHG | OXYGEN SATURATION: 97 %

## 2022-04-05 DIAGNOSIS — Z08 ENCOUNTER FOR FOLLOW-UP EXAMINATION AFTER SURGERY FOR MALIGNANT NEOPLASM: ICD-10-CM

## 2022-04-05 DIAGNOSIS — D05.11 DUCTAL CARCINOMA IN SITU (DCIS) OF RIGHT BREAST: Primary | ICD-10-CM

## 2022-04-05 DIAGNOSIS — Z17.0 MALIGNANT NEOPLASM OF CENTRAL PORTION OF RIGHT BREAST IN FEMALE, ESTROGEN RECEPTOR POSITIVE (H): ICD-10-CM

## 2022-04-05 DIAGNOSIS — D05.11 NEOPLASM OF RIGHT BREAST, PRIMARY TUMOR STAGING CATEGORY TIS: DUCTAL CARCINOMA IN SITU (DCIS): Primary | ICD-10-CM

## 2022-04-05 DIAGNOSIS — Z80.3 FAMILY HISTORY OF MALIGNANT NEOPLASM OF BREAST: ICD-10-CM

## 2022-04-05 DIAGNOSIS — C50.111 MALIGNANT NEOPLASM OF CENTRAL PORTION OF RIGHT BREAST IN FEMALE, ESTROGEN RECEPTOR POSITIVE (H): ICD-10-CM

## 2022-04-05 PROCEDURE — G0463 HOSPITAL OUTPT CLINIC VISIT: HCPCS

## 2022-04-05 PROCEDURE — 99024 POSTOP FOLLOW-UP VISIT: CPT | Performed by: SURGERY

## 2022-04-05 ASSESSMENT — PAIN SCALES - GENERAL: PAINLEVEL: NO PAIN (1)

## 2022-04-05 NOTE — PATIENT INSTRUCTIONS
You will get a call to schedule an appointment with Dr. Justin to discuss the best ways to keep your cancer from coming back. Please call if you think of a question or have a concern.

## 2022-04-05 NOTE — TELEPHONE ENCOUNTER
Oncology/Hematology Care Coordination - Referral Review      Referred by:  Dr. Cifuentes  To discuss hormone therapy    Diagnosis:  DCIS of right breast, Positive Estrogen receptor    Imaging:  3/8/22 diagnostic bilateral MA and left breast US, 3/17/22 right breast biopsy, 3/30/22 stereotactic MA    Lab:  To be drawn on 4/12    Surgery/Biopsy:  3/30 right breast tissue,     Pathology: [facility] 3/17 and 3/30    Outside Records:      Per Dr. Cifuentes patient is not doing radiation so Onco type cancelled.    Genetic counseling scheduled for 6/21    Tonya Abdalla RN on 4/5/2022 at 11:18 AM

## 2022-04-05 NOTE — PROGRESS NOTES
Patient presents for post surgical visit after right breast lumpectomy on 3/3/0/22. Patient has done well. No problems with incision.    /84 (BP Location: Right arm, Patient Position: Sitting, Cuff Size: Adult Regular)   Pulse 80   Temp 97.9  F (36.6  C) (Tympanic)   Resp 16   Wt 71.7 kg (158 lb)   SpO2 97%   BMI 26.29 kg/m    General: NAD, pleasant and cooperative with exam and interview.  Right breast: healing incision. No sign of infection. No pain with palpation.  Psychiatry: awake, alert and oriented. Appropriate affect.  Pathology results: 1.9 cm DCIS, ER +, closest margin posterior (was excised to chest wall muscle)  Assessment/Plan:  Discussed surgery and pathology results. Patient can return to normal activities. Oncology referral ordered. Patient reports that she doesn't want to pursue radiation therapy and that a high risk of recurrence on Oncotype DCIS will not change that, oncotype won't be ordered. Will check labs. No invasive disease, will not order PET scan. Patient has genetic counseling scheduled. Patient will call with questions or concerns.

## 2022-04-05 NOTE — NURSING NOTE
"Chief Complaint   Patient presents with     Surgical Followup     s/p right breast lumpectomy       Initial /84 (BP Location: Right arm, Patient Position: Sitting, Cuff Size: Adult Regular)   Pulse 80   Temp 97.9  F (36.6  C) (Tympanic)   Resp 16   Wt 71.7 kg (158 lb)   SpO2 97%   BMI 26.29 kg/m   Estimated body mass index is 26.29 kg/m  as calculated from the following:    Height as of 9/2/21: 1.651 m (5' 5\").    Weight as of this encounter: 71.7 kg (158 lb).  Medication Reconciliation: complete    Diana Govea LPN    "

## 2022-04-06 ENCOUNTER — TRANSFERRED RECORDS (OUTPATIENT)
Dept: HEALTH INFORMATION MANAGEMENT | Facility: OTHER | Age: 82
End: 2022-04-06
Payer: COMMERCIAL

## 2022-04-12 ENCOUNTER — LAB (OUTPATIENT)
Dept: LAB | Facility: OTHER | Age: 82
End: 2022-04-12
Attending: INTERNAL MEDICINE
Payer: MEDICARE

## 2022-04-12 DIAGNOSIS — D05.11 NEOPLASM OF RIGHT BREAST, PRIMARY TUMOR STAGING CATEGORY TIS: DUCTAL CARCINOMA IN SITU (DCIS): ICD-10-CM

## 2022-04-12 DIAGNOSIS — M05.79 SEROPOSITIVE RHEUMATOID ARTHRITIS OF MULTIPLE SITES (H): ICD-10-CM

## 2022-04-12 DIAGNOSIS — Z79.899 ENCOUNTER FOR LONG-TERM (CURRENT) USE OF MEDICATIONS: ICD-10-CM

## 2022-04-12 DIAGNOSIS — Z17.0 MALIGNANT NEOPLASM OF CENTRAL PORTION OF RIGHT BREAST IN FEMALE, ESTROGEN RECEPTOR POSITIVE (H): ICD-10-CM

## 2022-04-12 DIAGNOSIS — C50.111 MALIGNANT NEOPLASM OF CENTRAL PORTION OF RIGHT BREAST IN FEMALE, ESTROGEN RECEPTOR POSITIVE (H): ICD-10-CM

## 2022-04-12 LAB
ALBUMIN SERPL-MCNC: 4.3 G/DL (ref 3.5–5.7)
ALP SERPL-CCNC: 57 U/L (ref 34–104)
ALT SERPL W P-5'-P-CCNC: 13 U/L (ref 7–52)
ANION GAP SERPL CALCULATED.3IONS-SCNC: 7 MMOL/L (ref 3–14)
AST SERPL W P-5'-P-CCNC: 18 U/L (ref 13–39)
BASOPHILS # BLD AUTO: 0.1 10E3/UL (ref 0–0.2)
BASOPHILS NFR BLD AUTO: 1 %
BILIRUB SERPL-MCNC: 0.6 MG/DL (ref 0.3–1)
BUN SERPL-MCNC: 20 MG/DL (ref 7–25)
CALCIUM SERPL-MCNC: 10.4 MG/DL (ref 8.6–10.3)
CANCER AG27-29 SERPL-ACNC: 14 U/ML (ref 0–39)
CHLORIDE BLD-SCNC: 103 MMOL/L (ref 98–107)
CO2 SERPL-SCNC: 29 MMOL/L (ref 21–31)
CREAT SERPL-MCNC: 1.02 MG/DL (ref 0.6–1.2)
CRP SERPL-MCNC: 1 MG/L
EOSINOPHIL # BLD AUTO: 0.2 10E3/UL (ref 0–0.7)
EOSINOPHIL NFR BLD AUTO: 3 %
ERYTHROCYTE [DISTWIDTH] IN BLOOD BY AUTOMATED COUNT: 14.3 % (ref 10–15)
ERYTHROCYTE [SEDIMENTATION RATE] IN BLOOD BY WESTERGREN METHOD: 8 MM/HR (ref 0–30)
GFR SERPL CREATININE-BSD FRML MDRD: 55 ML/MIN/1.73M2
GLUCOSE BLD-MCNC: 85 MG/DL (ref 70–105)
HCT VFR BLD AUTO: 40.9 % (ref 35–47)
HGB BLD-MCNC: 13.4 G/DL (ref 11.7–15.7)
IMM GRANULOCYTES # BLD: 0 10E3/UL
IMM GRANULOCYTES NFR BLD: 0 %
LDH SERPL L TO P-CCNC: 162 U/L (ref 140–271)
LYMPHOCYTES # BLD AUTO: 1 10E3/UL (ref 0.8–5.3)
LYMPHOCYTES NFR BLD AUTO: 17 %
MCH RBC QN AUTO: 32.4 PG (ref 26.5–33)
MCHC RBC AUTO-ENTMCNC: 32.8 G/DL (ref 31.5–36.5)
MCV RBC AUTO: 99 FL (ref 78–100)
MONOCYTES # BLD AUTO: 0.7 10E3/UL (ref 0–1.3)
MONOCYTES NFR BLD AUTO: 11 %
NEUTROPHILS # BLD AUTO: 4.1 10E3/UL (ref 1.6–8.3)
NEUTROPHILS NFR BLD AUTO: 68 %
NRBC # BLD AUTO: 0 10E3/UL
NRBC BLD AUTO-RTO: 0 /100
PLATELET # BLD AUTO: 276 10E3/UL (ref 150–450)
POTASSIUM BLD-SCNC: 4.1 MMOL/L (ref 3.5–5.1)
PROT SERPL-MCNC: 7.2 G/DL (ref 6.4–8.9)
RBC # BLD AUTO: 4.14 10E6/UL (ref 3.8–5.2)
SODIUM SERPL-SCNC: 139 MMOL/L (ref 134–144)
WBC # BLD AUTO: 6 10E3/UL (ref 4–11)

## 2022-04-12 PROCEDURE — 86140 C-REACTIVE PROTEIN: CPT | Mod: ZL

## 2022-04-12 PROCEDURE — 85004 AUTOMATED DIFF WBC COUNT: CPT | Mod: ZL

## 2022-04-12 PROCEDURE — 86300 IMMUNOASSAY TUMOR CA 15-3: CPT | Mod: ZL

## 2022-04-12 PROCEDURE — 80053 COMPREHEN METABOLIC PANEL: CPT | Mod: ZL

## 2022-04-12 PROCEDURE — 83615 LACTATE (LD) (LDH) ENZYME: CPT | Mod: ZL

## 2022-04-12 PROCEDURE — 85652 RBC SED RATE AUTOMATED: CPT | Mod: ZL

## 2022-04-12 PROCEDURE — 36415 COLL VENOUS BLD VENIPUNCTURE: CPT | Mod: ZL

## 2022-05-11 ENCOUNTER — HOSPITAL ENCOUNTER (OUTPATIENT)
Dept: GENERAL RADIOLOGY | Facility: OTHER | Age: 82
Discharge: HOME | End: 2022-05-11
Attending: INTERNAL MEDICINE
Payer: MEDICARE

## 2022-05-11 ENCOUNTER — ONCOLOGY VISIT (OUTPATIENT)
Dept: ONCOLOGY | Facility: OTHER | Age: 82
End: 2022-05-11
Attending: INTERNAL MEDICINE
Payer: MEDICARE

## 2022-05-11 VITALS
SYSTOLIC BLOOD PRESSURE: 116 MMHG | DIASTOLIC BLOOD PRESSURE: 74 MMHG | BODY MASS INDEX: 26.29 KG/M2 | TEMPERATURE: 98.7 F | WEIGHT: 158 LBS | OXYGEN SATURATION: 93 % | RESPIRATION RATE: 30 BRPM | HEART RATE: 82 BPM

## 2022-05-11 DIAGNOSIS — Z87.39 HX OF RHEUMATOID ARTHRITIS: ICD-10-CM

## 2022-05-11 DIAGNOSIS — C50.111 MALIGNANT NEOPLASM OF CENTRAL PORTION OF RIGHT BREAST IN FEMALE, ESTROGEN RECEPTOR POSITIVE (H): ICD-10-CM

## 2022-05-11 DIAGNOSIS — N95.8 OTHER SPECIFIED MENOPAUSAL AND PERIMENOPAUSAL DISORDERS: ICD-10-CM

## 2022-05-11 DIAGNOSIS — Z17.0 MALIGNANT NEOPLASM OF CENTRAL PORTION OF RIGHT BREAST IN FEMALE, ESTROGEN RECEPTOR POSITIVE (H): ICD-10-CM

## 2022-05-11 DIAGNOSIS — Z80.3 FAMILY HISTORY OF MALIGNANT NEOPLASM OF BREAST: Primary | ICD-10-CM

## 2022-05-11 DIAGNOSIS — M85.80 OSTEOPENIA, UNSPECIFIED LOCATION: ICD-10-CM

## 2022-05-11 PROCEDURE — G0463 HOSPITAL OUTPT CLINIC VISIT: HCPCS | Mod: 25

## 2022-05-11 PROCEDURE — 99215 OFFICE O/P EST HI 40 MIN: CPT | Performed by: INTERNAL MEDICINE

## 2022-05-11 PROCEDURE — 99417 PROLNG OP E/M EACH 15 MIN: CPT | Performed by: INTERNAL MEDICINE

## 2022-05-11 PROCEDURE — G0463 HOSPITAL OUTPT CLINIC VISIT: HCPCS

## 2022-05-11 PROCEDURE — 73562 X-RAY EXAM OF KNEE 3: CPT | Mod: 50

## 2022-05-11 PROCEDURE — 73130 X-RAY EXAM OF HAND: CPT | Mod: 50

## 2022-05-11 ASSESSMENT — PAIN SCALES - GENERAL: PAINLEVEL: MILD PAIN (2)

## 2022-05-11 NOTE — NURSING NOTE
Chief Complaint   Patient presents with     Oncology Clinic Visit     CONSULT:  DCIS right breast     Medication Reconciliation: complete    Suni Lozoya CMA ([facility])

## 2022-05-11 NOTE — PROGRESS NOTES
Visit Date: 2022    REASON FOR CONSULTATION:  Ductal carcinoma in situ of the right breast.    REQUESTING PHYSICIAN:  Dr. Cifuentes and Hillary Quinn, nurse practitioner.    HISTORY OF PRESENT ILLNESS:  Mr. Wise is an 81-year-old white female with history of rheumatoid arthritis, hyperlipidemia, who we were asked to evaluate concerning new diagnosis of ductal carcinoma in situ of the right breast.  Apparently, she had undergone routine mammography and was found to have an abnormal mammogram with microcalcifications in the right breast.  The patient underwent stereotactic-guided biopsy, was found to have ductal carcinoma in situ, grade 2, with strongly ER positive, WI positive, HER-2/tommy was not tested.  She was subsequently seen by Dr. Betina Cifuentes, who proceeded with right breast lumpectomy performed on 2022 and the findings were the patient had ductal carcinoma in situ, nuclear grade 2, cribriform type.  Size was 1.9 cm.  Margins were negative.  Estrogen receptor was positive %, was negative for invasive malignancy.  In terms of risk factors of breast cancer, she states she has had two sisters with breast cancer, one  of metastatic breast cancer in her 40s.  She also had an aunt with breast cancer.  Other risk factors included that she had three children, first child was born at age 28.  Last two at 30 and 31.  She  them all.  She also underwent menopause at age 55 and was on at least 5 years of hormone replacement therapy due to hot flashes, was a nonsmoker, nondrinker.  She is somewhat concerned because of her sister who had  young of metastatic breast cancer.  She wanted to know if she has any other reasons to have metastatic breast cancer.  In terms of symptoms, she is currently being treated for rheumatoid arthritis with methotrexate and Plaquenil.  She is also being evaluated for possible hyperparathyroidism.  She says she is chronically fatigued.  She gets dyspneic with  exertion.  She also has this abdominal pain, epigastric pain that comes and goes.  She denies any bright red blood per rectum, hematemesis.  She also described bone pain.  She has never had a bone density scan.  She denies any fevers or night sweats or weight loss.  She does get occasional hot flashes.    PAST MEDICAL HISTORY:  As above, history of rheumatoid arthritis, currently on Plaquenil and methotrexate, hyperlipidemia, history of neck injury during a skiing accident at a younger age, history of CVA, history of unspecified chest pain, stage IIIA chronic kidney disease, history of left shoulder pain.  Sjogren's syndrome, spondylosis of cervical, phlebitis and thrombophlebitis superficial vessels in lower extremities, pain in multiple joints, pelvic region and thigh.    ALLERGIES:  SHE IS ALLERGIC TO MULTIPLE MEDICATIONS INCLUDING CODEINE, EXCEDRIN WITH ACETAMINOPHEN, ASPIRIN, ATORVASTATIN, AUGMENTIN, CRESTOR, SIMVASTATIN, SULFA DRUGS, TRAMADOL.    CURRENT MEDICATIONS:  Include Plaquenil 300 mg daily, metronidazole 0.75% external gel applied topically daily, Zetia 10 mg daily, aspirin 81 mg daily, methotrexate 15 mg subcutaneously every 7 days.  Triamcinolone cream, vitamin C 1000 mg daily, Restasis 0.05% one drop into both eyes, grant capsules daily, Voltaren 1% topical gel, turmeric curcumin 500 mg tablets daily, vitamin E 400 units daily, vitamin D3 2000 units daily, flaxseed oil daily, calcium carbonate, vitamin D daily, omega 3 fatty acids daily, folic acid 2 mg daily.    SOCIAL HISTORY:  Tobacco is negative.  Alcohol is negative.  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  Subsequently, in [employer] in [employer], as well as [place].  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    FAMILY HISTORY:  As above.  Aunt with breast cancer.  One sister with breast cancer,  of metastatic disease at age 40, another sister with breast cancer.    REVIEW OF  SYSTEMS:  CENTRAL NERVOUS SYSTEM:  Negative for headaches.  She gets occasional neck associated headaches.  There is no hearing loss.    RESPIRATORY: She does get dyspneic on exertion.  No cough, hemoptysis.  CARDIAC:  Negative for chest pain, palpitation, orthopnea, PND, ankle edema.  GASTROINTESTINAL:  Negative for bright red blood per rectum, hematemesis, melena.  She did have constipation prior to starting Plaquenil.  She says now she has normal bowel movements.  MUSCULOSKELETAL:  Significant for multiple joint pains including the wrists, fingers, thighs, hips.  GENITOURINARY:  Negative for hematuria.  CONSTITUTIONAL:  Negative for fevers, night sweats, weight loss.  ENDOCRINE:  Significant for hot flashes.  HEMATOLOGIC:  Negative for easy bruisability, gingival bleeding, epistaxis.    PHYSICAL EXAMINATION:   GENERAL:  She is an elderly white female in no acute distress, ECOG performance status is 0.  VITAL SIGNS:  Reveal blood pressure 160/74, pulse 82, respirations 30, temperature 98.7.  HEENT:  Atraumatic, normocephalic.  Oropharynx nonerythematous.  NECK:  Supple, no thyromegaly.  LUNGS:  Clear to auscultation and percussion.  HEART:  Regular rhythm.  S1, S2 normal.  BREASTS:  Deferred.  ABDOMEN:  Soft, normoactive bowel sounds.  No mass, nontender.  LYMPHATICS:  No cervical, supraclavicular, axillary or inguinal nodes.  EXTREMITIES:  Trace ankle edema.  NEUROLOGIC:  Nonfocal.    LABORATORY DATA:  Reveal CBC that is essentially within normal limits.  BUN 20, creatinine 1.02, calcium is 10.4.  CRP is 1.  CA 27-29 is 14.  LDH is 162.    ASSESSMENT AND PLAN:  Stage 0 malignant neoplasm of the right breast consistent with grade 2 DCIS with a 1.9 cm mass, status post lumpectomy.  The patient is not interested in adjuvant radiation therapy.  Given her ER positivity, she would be a candidate for aromatase inhibitor therapy, which has been shown to reduce the development of invasive breast cancer in the ipsilateral  breast as well as contralateral breast.  She has multiple symptoms and complaints and worries about underlying malignancy.  We have counseled her that DCIS rarely metastasizes.  Nonetheless, she is interested in getting a PET scan due to her multiple complaints including shortness of breath, bone pain, abdominal pain.  We will also obtain a bone density scan and then see the patient after the above results and likely initiate aromatase inhibitor therapy with anastrozole 1 mg p.o. daily.  She will be on this for at least 5 years.    TIME SPENT:  110 minutes were spent on this patient.  Time was spent reviewing multiple physician provider notes, lab results, imaging results, performing history and physical, documenting history and physical and ordering followup labs and scans.    Karen Justin MD        D: 2022   T: 2022   MT: DEANA    Name:     MALA GROSSMAN  MRN:      [id number]        Account:    [id number]   :      1940           Visit Date: 2022     Document: [id number]    cc:  LILIA Magaña MD

## 2022-06-08 ENCOUNTER — HOSPITAL ENCOUNTER (OUTPATIENT)
Dept: BONE DENSITY | Facility: OTHER | Age: 82
Discharge: HOME | End: 2022-06-08
Attending: INTERNAL MEDICINE
Payer: MEDICARE

## 2022-06-08 ENCOUNTER — HOSPITAL ENCOUNTER (OUTPATIENT)
Dept: PET IMAGING | Facility: OTHER | Age: 82
Discharge: HOME | End: 2022-06-08
Attending: INTERNAL MEDICINE
Payer: MEDICARE

## 2022-06-08 ENCOUNTER — LAB (OUTPATIENT)
Dept: LAB | Facility: OTHER | Age: 82
End: 2022-06-08
Attending: INTERNAL MEDICINE
Payer: MEDICARE

## 2022-06-08 DIAGNOSIS — C50.111 MALIGNANT NEOPLASM OF CENTRAL PORTION OF RIGHT BREAST IN FEMALE, ESTROGEN RECEPTOR POSITIVE (H): ICD-10-CM

## 2022-06-08 DIAGNOSIS — Z17.0 MALIGNANT NEOPLASM OF CENTRAL PORTION OF RIGHT BREAST IN FEMALE, ESTROGEN RECEPTOR POSITIVE (H): ICD-10-CM

## 2022-06-08 DIAGNOSIS — N95.8 OTHER SPECIFIED MENOPAUSAL AND PERIMENOPAUSAL DISORDERS: ICD-10-CM

## 2022-06-08 DIAGNOSIS — Z80.3 FAMILY HISTORY OF MALIGNANT NEOPLASM OF BREAST: ICD-10-CM

## 2022-06-08 LAB
ALBUMIN SERPL-MCNC: 4.5 G/DL (ref 3.5–5.7)
ALP SERPL-CCNC: 57 U/L (ref 34–104)
ALT SERPL W P-5'-P-CCNC: 17 U/L (ref 7–52)
ANION GAP SERPL CALCULATED.3IONS-SCNC: 6 MMOL/L (ref 3–14)
AST SERPL W P-5'-P-CCNC: 22 U/L (ref 13–39)
BASOPHILS # BLD AUTO: 0.1 10E3/UL (ref 0–0.2)
BASOPHILS NFR BLD AUTO: 2 %
BILIRUB SERPL-MCNC: 0.5 MG/DL (ref 0.3–1)
BUN SERPL-MCNC: 20 MG/DL (ref 7–25)
CALCIUM SERPL-MCNC: 9.8 MG/DL (ref 8.6–10.3)
CHLORIDE BLD-SCNC: 101 MMOL/L (ref 98–107)
CO2 SERPL-SCNC: 29 MMOL/L (ref 21–31)
CREAT SERPL-MCNC: 0.96 MG/DL (ref 0.6–1.2)
EOSINOPHIL # BLD AUTO: 0.1 10E3/UL (ref 0–0.7)
EOSINOPHIL NFR BLD AUTO: 3 %
ERYTHROCYTE [DISTWIDTH] IN BLOOD BY AUTOMATED COUNT: 13 % (ref 10–15)
GFR SERPL CREATININE-BSD FRML MDRD: 59 ML/MIN/1.73M2
GLUCOSE BLD-MCNC: 91 MG/DL (ref 70–105)
HCT VFR BLD AUTO: 40.7 % (ref 35–47)
HGB BLD-MCNC: 13.2 G/DL (ref 11.7–15.7)
IMM GRANULOCYTES # BLD: 0 10E3/UL
IMM GRANULOCYTES NFR BLD: 0 %
LDH SERPL L TO P-CCNC: 191 U/L (ref 140–271)
LYMPHOCYTES # BLD AUTO: 1 10E3/UL (ref 0.8–5.3)
LYMPHOCYTES NFR BLD AUTO: 21 %
MCH RBC QN AUTO: 31.7 PG (ref 26.5–33)
MCHC RBC AUTO-ENTMCNC: 32.4 G/DL (ref 31.5–36.5)
MCV RBC AUTO: 98 FL (ref 78–100)
MONOCYTES # BLD AUTO: 0.5 10E3/UL (ref 0–1.3)
MONOCYTES NFR BLD AUTO: 9 %
NEUTROPHILS # BLD AUTO: 3.3 10E3/UL (ref 1.6–8.3)
NEUTROPHILS NFR BLD AUTO: 65 %
NRBC # BLD AUTO: 0 10E3/UL
NRBC BLD AUTO-RTO: 0 /100
PLATELET # BLD AUTO: 252 10E3/UL (ref 150–450)
POTASSIUM BLD-SCNC: 4.3 MMOL/L (ref 3.5–5.1)
PROT SERPL-MCNC: 7.3 G/DL (ref 6.4–8.9)
RBC # BLD AUTO: 4.17 10E6/UL (ref 3.8–5.2)
SODIUM SERPL-SCNC: 136 MMOL/L (ref 134–144)
WBC # BLD AUTO: 5 10E3/UL (ref 4–11)

## 2022-06-08 PROCEDURE — 83615 LACTATE (LD) (LDH) ENZYME: CPT | Mod: ZL

## 2022-06-08 PROCEDURE — 85025 COMPLETE CBC W/AUTO DIFF WBC: CPT | Mod: ZL

## 2022-06-08 PROCEDURE — 77080 DXA BONE DENSITY AXIAL: CPT

## 2022-06-08 PROCEDURE — 80053 COMPREHEN METABOLIC PANEL: CPT | Mod: ZL

## 2022-06-08 PROCEDURE — 36415 COLL VENOUS BLD VENIPUNCTURE: CPT | Mod: ZL

## 2022-06-08 PROCEDURE — A9552 F18 FDG: HCPCS | Performed by: INTERNAL MEDICINE

## 2022-06-08 PROCEDURE — 343N000001 HC RX 343: Performed by: INTERNAL MEDICINE

## 2022-06-08 PROCEDURE — 78815 PET IMAGE W/CT SKULL-THIGH: CPT | Mod: PI

## 2022-06-08 RX ADMIN — FLUDEOXYGLUCOSE F-18 15.78 MCI.: 500 INJECTION, SOLUTION INTRAVENOUS at 16:27

## 2022-06-15 ENCOUNTER — ONCOLOGY VISIT (OUTPATIENT)
Dept: ONCOLOGY | Facility: OTHER | Age: 82
End: 2022-06-15
Attending: INTERNAL MEDICINE
Payer: COMMERCIAL

## 2022-06-15 VITALS
BODY MASS INDEX: 25.79 KG/M2 | HEART RATE: 81 BPM | RESPIRATION RATE: 16 BRPM | DIASTOLIC BLOOD PRESSURE: 76 MMHG | TEMPERATURE: 97.9 F | WEIGHT: 155 LBS | OXYGEN SATURATION: 97 % | SYSTOLIC BLOOD PRESSURE: 132 MMHG

## 2022-06-15 DIAGNOSIS — Z17.0 MALIGNANT NEOPLASM OF CENTRAL PORTION OF RIGHT BREAST IN FEMALE, ESTROGEN RECEPTOR POSITIVE (H): Primary | ICD-10-CM

## 2022-06-15 DIAGNOSIS — R91.8 PULMONARY NODULES: ICD-10-CM

## 2022-06-15 DIAGNOSIS — C50.111 MALIGNANT NEOPLASM OF CENTRAL PORTION OF RIGHT BREAST IN FEMALE, ESTROGEN RECEPTOR POSITIVE (H): Primary | ICD-10-CM

## 2022-06-15 PROCEDURE — 99417 PROLNG OP E/M EACH 15 MIN: CPT | Performed by: INTERNAL MEDICINE

## 2022-06-15 PROCEDURE — G0463 HOSPITAL OUTPT CLINIC VISIT: HCPCS

## 2022-06-15 PROCEDURE — 99215 OFFICE O/P EST HI 40 MIN: CPT | Performed by: INTERNAL MEDICINE

## 2022-06-15 ASSESSMENT — PAIN SCALES - GENERAL: PAINLEVEL: MILD PAIN (2)

## 2022-06-15 NOTE — PROGRESS NOTES
Visit Date: 06/15/2022    HISTORY OF PRESENT ILLNESS:  Ms. Wise returns for followup of DCIS.  We had seen the patient in consultation at the request of Dr. Betina Cifuentes and Hillary Quinn, nurse practitioner, on 2022.  At that time, Ms. Wise was an 81-year-old white female with history of rheumatoid arthritis, hyperlipidemia, we were asked to evaluate concerning a diagnosis of DCIS of the right breast.  Apparently, she had undergone routine mammography and was found to have an abnormal mammogram with microcalcifications in the right breast.  The patient underwent stereotactic-guided biopsy and was found to have DCIS, grade 2, strongly ER positive, WA positive.  HER-2/tommy was not tested.  She was subsequently seen by Dr. Betina Cifuentes who proceeded with right breast lumpectomy performed on 2022, and the findings were the patient had ductal carcinoma in situ, nuclear grade 2, cribriform type.  Size was 1.9 cm.  Margins were negative.  Estrogen receptor was positive at % and was negative for invasive malignancy.  In terms of risk factors of breast cancer, she had two sisters with breast cancer, one  of metastatic breast cancer in her 40s.  She also had an aunt with breast cancer.  Other risk factors include that she had three children, first child was born at age 28, and the last two at age 30 and 31.  She  them all.  She also underwent menopause at age 55, was on at least 5 years of hormone replacement therapy due to hot flashes.  She was a nonsmoker, nondrinker.  She was somewhat concerned about because her sister had  young of metastatic breast cancer, she wanted to know if she has any other reasons to have metastatic breast cancer in terms of symptoms.  She currently is being treated for rheumatoid arthritis with methotrexate and Plaquenil.  She also had been evaluated for possible hyperparathyroidism.  She complained of chronic fatigue, dyspnea on exertion.  She also had some  epigastric pain.  She denies any bright red blood per rectum or hematemesis.  She also described bone pain.  She never had a bone density scan.  When we saw the patient, we recommended adjuvant radiation therapy.  The patient refused.  We felt given her ER positivity, she would be a candidate for aromatase inhibitor therapy, which had shown to reduce development of invasive breast cancer in the ipsilateral breast as well as contralateral breast.  We have counseled her that the DCIS rarely metastasizes, but she was adamant about getting a PET scan due to her family history and her complaints of shortness of breath, bone pain, abdominal pain.  We went ahead and ordered a PET scan.  This was done on 06/08/2022, and the findings were that there was small pulmonary nodules up to 4 mm.  There was no previous CT to compare this to.  They were not hypermetabolic.  It was recommended she repeat CT chest in 6 months.  Rest of the PET scan was essentially negative for hypermetabolism.  She also had a bone density scan that was on 06/08/2022 and was read as osteopenia with the lowest T-score being -2.3 in the right femoral neck.  FRAX score for major osteoporotic fracture was 22.7%.  FRAX score for hip fracture was 8.5%.  The patient comes in today.  She states she is doing reasonably well.  She denies any shortness of breath, chest pain, abdominal pain.  She has read up on aromatase inhibitors, Arimidex, and does not want to be on them.  She does not want to have hot flashes or osteoporosis.  We have counseled her that we can still treat the osteopenia, osteoporosis, still continue with aromatase inhibitor.  The patient refuses.  She would like to take her chances.  She otherwise has no other complaints today.    PHYSICAL EXAMINATION:    GENERAL:  Reveals a frail, elderly white female in no acute distress, ECOG performance status is 1.  VITAL SIGNS:  Reveal blood pressure 132/76, pulse 81, respirations 16, temperature  "97.9.  HEENT:  Atraumatic, normocephalic.  Oropharynx nonerythematous.  NECK:  Supple, no thyromegaly.  LUNGS:  Clear to auscultation and percussion.   HEART:  Regular rhythm.  S1, S2 normal.  BREASTS:  Deferred.  ABDOMEN:  Soft, normoactive bowel sounds.  No mass, nontender.  LYMPHATICS:  No cervical, supraclavicular, axillary or inguinal nodes.   EXTREMITIES:  Trace ankle edema.   NEUROLOGIC:  Grossly nonfocal.    LABORATORY DATA:  Reveal CBC with white count 5.0, H and H 13.2 and 40.7, platelet count is 252.  Calcium is 9.8.  BUN 20, creatinine 0.96.  Liver function tests are normal.  LDH is 191.    ASSESSMENT AND PLAN:  Stage 0 malignant neoplasm of the right breast consistent with grade 2 DCIS with 1.9 cm mass, status post lumpectomy.  The patient refused adjuvant radiation therapy.  PET scan was essentially negative except for multiple nonspecific pulmonary nodules.  We have offered the patient aromatase inhibitor therapy.  The patient refused.  Her bone density scan does show osteopenia.  We have recommended vitamin D and calcium.  She refused aromatase inhibitor therapy.  She does not want to go through flashes and is willing to \"take a chance,\" even though it certainly would reduce the risk of developing breast cancer in the ipsilateral breast, and she would like to just continue with mammograms for now.  Otherwise, we would like to see the patient in 6 months and repeat CT chest given her pulmonary nodules, obtain CBC, CMP, LDH, CA 27-29.    Seventy minutes was spent on this patient.  Time was spent reviewing physician provider notes, imaging results with the patient, reviewing the role of aromatase inhibitor therapy.  Time was spent performing history and physical, documenting history and physical, ordering followup labs and CT chest.    Karen Justin MD        D: 06/15/2022   T: 06/15/2022   MT: DEANA    Name:     MALA GROSSMAN  MRN:      [id number]        Account:    [id number]   :     "  1940           Visit Date: 06/15/2022     Document: [id number]    cc:  MD Kaia Dos Santos NP

## 2022-06-15 NOTE — NURSING NOTE
Chief Complaint   Patient presents with     Oncology Clinic Visit     DCIS Right breast     Medication Reconciliation: complete    Suni Lozoya CMA ([facility])

## 2022-07-12 ENCOUNTER — LAB (OUTPATIENT)
Dept: LAB | Facility: OTHER | Age: 82
End: 2022-07-12
Attending: INTERNAL MEDICINE
Payer: MEDICARE

## 2022-07-12 DIAGNOSIS — M05.79 SEROPOSITIVE RHEUMATOID ARTHRITIS OF MULTIPLE SITES (H): ICD-10-CM

## 2022-07-12 DIAGNOSIS — Z79.899 ENCOUNTER FOR LONG-TERM (CURRENT) USE OF MEDICATIONS: ICD-10-CM

## 2022-07-12 LAB
ALBUMIN SERPL-MCNC: 4.1 G/DL (ref 3.5–5.7)
ALP SERPL-CCNC: 58 U/L (ref 34–104)
ALT SERPL W P-5'-P-CCNC: 13 U/L (ref 7–52)
ANION GAP SERPL CALCULATED.3IONS-SCNC: 7 MMOL/L (ref 3–14)
AST SERPL W P-5'-P-CCNC: 20 U/L (ref 13–39)
BASOPHILS # BLD AUTO: 0.1 10E3/UL (ref 0–0.2)
BASOPHILS NFR BLD AUTO: 2 %
BILIRUB SERPL-MCNC: 0.4 MG/DL (ref 0.3–1)
BUN SERPL-MCNC: 21 MG/DL (ref 7–25)
CALCIUM SERPL-MCNC: 9.7 MG/DL (ref 8.6–10.3)
CHLORIDE BLD-SCNC: 104 MMOL/L (ref 98–107)
CO2 SERPL-SCNC: 27 MMOL/L (ref 21–31)
CREAT SERPL-MCNC: 1.08 MG/DL (ref 0.6–1.2)
CRP SERPL-MCNC: <1 MG/L
EOSINOPHIL # BLD AUTO: 0.2 10E3/UL (ref 0–0.7)
EOSINOPHIL NFR BLD AUTO: 3 %
ERYTHROCYTE [DISTWIDTH] IN BLOOD BY AUTOMATED COUNT: 13 % (ref 10–15)
ERYTHROCYTE [SEDIMENTATION RATE] IN BLOOD BY WESTERGREN METHOD: 10 MM/HR (ref 0–30)
GFR SERPL CREATININE-BSD FRML MDRD: 51 ML/MIN/1.73M2
GLUCOSE BLD-MCNC: 118 MG/DL (ref 70–105)
HCT VFR BLD AUTO: 38.9 % (ref 35–47)
HGB BLD-MCNC: 12.9 G/DL (ref 11.7–15.7)
IMM GRANULOCYTES # BLD: 0 10E3/UL
IMM GRANULOCYTES NFR BLD: 0 %
LYMPHOCYTES # BLD AUTO: 1.1 10E3/UL (ref 0.8–5.3)
LYMPHOCYTES NFR BLD AUTO: 22 %
MCH RBC QN AUTO: 31.8 PG (ref 26.5–33)
MCHC RBC AUTO-ENTMCNC: 33.2 G/DL (ref 31.5–36.5)
MCV RBC AUTO: 96 FL (ref 78–100)
MONOCYTES # BLD AUTO: 0.6 10E3/UL (ref 0–1.3)
MONOCYTES NFR BLD AUTO: 11 %
NEUTROPHILS # BLD AUTO: 3.2 10E3/UL (ref 1.6–8.3)
NEUTROPHILS NFR BLD AUTO: 62 %
NRBC # BLD AUTO: 0 10E3/UL
NRBC BLD AUTO-RTO: 0 /100
PLATELET # BLD AUTO: 252 10E3/UL (ref 150–450)
POTASSIUM BLD-SCNC: 4.2 MMOL/L (ref 3.5–5.1)
PROT SERPL-MCNC: 6.7 G/DL (ref 6.4–8.9)
RBC # BLD AUTO: 4.06 10E6/UL (ref 3.8–5.2)
SODIUM SERPL-SCNC: 138 MMOL/L (ref 134–144)
WBC # BLD AUTO: 5.1 10E3/UL (ref 4–11)

## 2022-07-12 PROCEDURE — 36415 COLL VENOUS BLD VENIPUNCTURE: CPT | Mod: ZL

## 2022-07-12 PROCEDURE — 86140 C-REACTIVE PROTEIN: CPT | Mod: ZL

## 2022-07-12 PROCEDURE — 85025 COMPLETE CBC W/AUTO DIFF WBC: CPT | Mod: ZL

## 2022-07-12 PROCEDURE — 85652 RBC SED RATE AUTOMATED: CPT | Mod: ZL

## 2022-07-12 PROCEDURE — 80053 COMPREHEN METABOLIC PANEL: CPT | Mod: ZL

## 2022-07-20 ENCOUNTER — TRANSFERRED RECORDS (OUTPATIENT)
Dept: HEALTH INFORMATION MANAGEMENT | Facility: OTHER | Age: 82
End: 2022-07-20

## 2022-09-20 ENCOUNTER — LAB (OUTPATIENT)
Dept: LAB | Facility: OTHER | Age: 82
End: 2022-09-20
Payer: MEDICARE

## 2022-09-20 DIAGNOSIS — Z79.899 ENCOUNTER FOR LONG-TERM (CURRENT) USE OF MEDICATIONS: ICD-10-CM

## 2022-09-20 DIAGNOSIS — M05.79 SEROPOSITIVE RHEUMATOID ARTHRITIS OF MULTIPLE SITES (H): ICD-10-CM

## 2022-10-11 ENCOUNTER — TELEPHONE (OUTPATIENT)
Dept: LAB | Facility: OTHER | Age: 82
End: 2022-10-11

## 2022-10-11 NOTE — PROGRESS NOTES
Patient is coming in for labs on 10/13. Is she needing labs from X-1s because the ones in her chart are expected for December. Thank you.

## 2022-12-12 ENCOUNTER — LAB (OUTPATIENT)
Dept: LAB | Facility: OTHER | Age: 82
End: 2022-12-12
Attending: INTERNAL MEDICINE
Payer: MEDICARE

## 2022-12-12 DIAGNOSIS — Z79.899 ENCOUNTER FOR LONG-TERM (CURRENT) USE OF MEDICATIONS: ICD-10-CM

## 2022-12-12 DIAGNOSIS — C50.111 MALIGNANT NEOPLASM OF CENTRAL PORTION OF RIGHT BREAST IN FEMALE, ESTROGEN RECEPTOR POSITIVE (H): ICD-10-CM

## 2022-12-12 DIAGNOSIS — M05.79 SEROPOSITIVE RHEUMATOID ARTHRITIS OF MULTIPLE SITES (H): ICD-10-CM

## 2022-12-12 DIAGNOSIS — Z17.0 MALIGNANT NEOPLASM OF CENTRAL PORTION OF RIGHT BREAST IN FEMALE, ESTROGEN RECEPTOR POSITIVE (H): ICD-10-CM

## 2022-12-12 LAB
ALBUMIN SERPL BCG-MCNC: 4.4 G/DL (ref 3.5–5.2)
ALP SERPL-CCNC: 82 U/L (ref 35–104)
ALT SERPL W P-5'-P-CCNC: 22 U/L (ref 10–35)
ANION GAP SERPL CALCULATED.3IONS-SCNC: 9 MMOL/L (ref 7–15)
AST SERPL W P-5'-P-CCNC: 27 U/L (ref 10–35)
BASOPHILS # BLD AUTO: 0.1 10E3/UL (ref 0–0.2)
BASOPHILS NFR BLD AUTO: 2 %
BILIRUB SERPL-MCNC: 0.2 MG/DL
BUN SERPL-MCNC: 16.6 MG/DL (ref 8–23)
CALCIUM SERPL-MCNC: 10.2 MG/DL (ref 8.8–10.2)
CHLORIDE SERPL-SCNC: 102 MMOL/L (ref 98–107)
CREAT SERPL-MCNC: 0.94 MG/DL (ref 0.51–0.95)
CRP SERPL-MCNC: <3 MG/L
DEPRECATED HCO3 PLAS-SCNC: 27 MMOL/L (ref 22–29)
EOSINOPHIL # BLD AUTO: 0.2 10E3/UL (ref 0–0.7)
EOSINOPHIL NFR BLD AUTO: 3 %
ERYTHROCYTE [DISTWIDTH] IN BLOOD BY AUTOMATED COUNT: 14.5 % (ref 10–15)
ERYTHROCYTE [SEDIMENTATION RATE] IN BLOOD BY WESTERGREN METHOD: 12 MM/HR (ref 0–30)
GFR SERPL CREATININE-BSD FRML MDRD: 60 ML/MIN/1.73M2
GLUCOSE SERPL-MCNC: 127 MG/DL (ref 70–99)
HCT VFR BLD AUTO: 38.7 % (ref 35–47)
HGB BLD-MCNC: 13.1 G/DL (ref 11.7–15.7)
IMM GRANULOCYTES # BLD: 0 10E3/UL
IMM GRANULOCYTES NFR BLD: 0 %
LDH SERPL L TO P-CCNC: 183 U/L (ref 0–250)
LYMPHOCYTES # BLD AUTO: 1.1 10E3/UL (ref 0.8–5.3)
LYMPHOCYTES NFR BLD AUTO: 19 %
MCH RBC QN AUTO: 32.8 PG (ref 26.5–33)
MCHC RBC AUTO-ENTMCNC: 33.9 G/DL (ref 31.5–36.5)
MCV RBC AUTO: 97 FL (ref 78–100)
MONOCYTES # BLD AUTO: 0.5 10E3/UL (ref 0–1.3)
MONOCYTES NFR BLD AUTO: 9 %
NEUTROPHILS # BLD AUTO: 3.9 10E3/UL (ref 1.6–8.3)
NEUTROPHILS NFR BLD AUTO: 67 %
NRBC # BLD AUTO: 0 10E3/UL
NRBC BLD AUTO-RTO: 0 /100
PLATELET # BLD AUTO: 244 10E3/UL (ref 150–450)
POTASSIUM SERPL-SCNC: 4.3 MMOL/L (ref 3.4–5.3)
PROT SERPL-MCNC: 7 G/DL (ref 6.4–8.3)
RBC # BLD AUTO: 4 10E6/UL (ref 3.8–5.2)
SODIUM SERPL-SCNC: 138 MMOL/L (ref 136–145)
WBC # BLD AUTO: 5.8 10E3/UL (ref 4–11)

## 2022-12-12 PROCEDURE — 85004 AUTOMATED DIFF WBC COUNT: CPT | Mod: ZL

## 2022-12-12 PROCEDURE — 86300 IMMUNOASSAY TUMOR CA 15-3: CPT | Mod: ZL

## 2022-12-12 PROCEDURE — 83615 LACTATE (LD) (LDH) ENZYME: CPT | Mod: ZL

## 2022-12-12 PROCEDURE — 36415 COLL VENOUS BLD VENIPUNCTURE: CPT | Mod: ZL

## 2022-12-12 PROCEDURE — 86140 C-REACTIVE PROTEIN: CPT | Mod: ZL

## 2022-12-12 PROCEDURE — 85652 RBC SED RATE AUTOMATED: CPT | Mod: ZL

## 2022-12-12 PROCEDURE — 82310 ASSAY OF CALCIUM: CPT | Mod: ZL

## 2022-12-14 LAB — CANCER AG27-29 SERPL-ACNC: 14 U/ML

## 2022-12-19 ENCOUNTER — HOSPITAL ENCOUNTER (OUTPATIENT)
Dept: CT IMAGING | Facility: OTHER | Age: 82
Discharge: HOME | End: 2022-12-19
Attending: INTERNAL MEDICINE | Admitting: INTERNAL MEDICINE
Payer: MEDICARE

## 2022-12-19 DIAGNOSIS — R91.8 PULMONARY NODULES: ICD-10-CM

## 2022-12-19 PROCEDURE — 71260 CT THORAX DX C+: CPT

## 2022-12-19 PROCEDURE — 250N000011 HC RX IP 250 OP 636: Performed by: INTERNAL MEDICINE

## 2022-12-19 RX ORDER — IOPAMIDOL 755 MG/ML
90 INJECTION, SOLUTION INTRAVASCULAR ONCE
Status: COMPLETED | OUTPATIENT
Start: 2022-12-19 | End: 2022-12-19

## 2022-12-19 RX ADMIN — IOPAMIDOL 90 ML: 755 INJECTION, SOLUTION INTRAVENOUS at 15:13

## 2022-12-21 ENCOUNTER — ONCOLOGY VISIT (OUTPATIENT)
Dept: ONCOLOGY | Facility: OTHER | Age: 82
End: 2022-12-21
Attending: INTERNAL MEDICINE
Payer: COMMERCIAL

## 2022-12-21 VITALS
WEIGHT: 158 LBS | SYSTOLIC BLOOD PRESSURE: 120 MMHG | TEMPERATURE: 97.5 F | DIASTOLIC BLOOD PRESSURE: 70 MMHG | RESPIRATION RATE: 18 BRPM | OXYGEN SATURATION: 97 % | HEART RATE: 80 BPM | BODY MASS INDEX: 26.29 KG/M2

## 2022-12-21 DIAGNOSIS — Z17.0 MALIGNANT NEOPLASM OF CENTRAL PORTION OF RIGHT BREAST IN FEMALE, ESTROGEN RECEPTOR POSITIVE (H): Primary | ICD-10-CM

## 2022-12-21 DIAGNOSIS — C50.111 MALIGNANT NEOPLASM OF CENTRAL PORTION OF RIGHT BREAST IN FEMALE, ESTROGEN RECEPTOR POSITIVE (H): Primary | ICD-10-CM

## 2022-12-21 PROCEDURE — 99417 PROLNG OP E/M EACH 15 MIN: CPT | Performed by: INTERNAL MEDICINE

## 2022-12-21 PROCEDURE — G0463 HOSPITAL OUTPT CLINIC VISIT: HCPCS

## 2022-12-21 PROCEDURE — 99215 OFFICE O/P EST HI 40 MIN: CPT | Performed by: INTERNAL MEDICINE

## 2022-12-21 ASSESSMENT — PAIN SCALES - GENERAL: PAINLEVEL: MILD PAIN (3)

## 2022-12-21 NOTE — PROGRESS NOTES
Visit Date: 2022    HISTORY OF PRESENT ILLNESS:  Mrs. Wise returns for followup of DCIS.  We had seen the patient in consultation at the request of Dr. Cifuentes and Hillary Quinn, nurse practitioner, on 2022.  At that time, Ms. Wise was an 81-year-old white female with history of rheumatoid arthritis, hyperlipidemia, who we were asked to evaluate concerning diagnosis of DCIS of the right breast.  Apparently, she had undergone routine mammography and was found to have an abnormal mammogram with microcalcification in the right breast.  The patient underwent stereotactic-guided biopsy and was found to have DCIS, grade 2, strongly ER positive, VA positive.  HER-2/tommy was not tested.  She was ultimately seen by Dr. Betina Cifuentes who proceeded with right breast lumpectomy performed on 2022 and the findings were that the patient had DCIS, nuclear grade 2, cribriform type.  Size was 1.9 cm.  Margins were negative.  Estrogen receptor was positive at % and was negative for invasive malignancy.  In terms of risk factors of breast cancer, she had 2 sisters with breast cancer, 1  of metastatic breast cancer in her 40s.  She also had an aunt with breast cancer.  Other risk factors included that she had 3 children.  The first child was born at age 28 and the last 2 at age 33 and she  them all.  She also underwent menopause at age 55.  She was on at least 5 years of hormone replacement therapy due to hot flashes.  She was a nonsmoker, nondrinker.  She was somewhat concerned about her diagnosis.  Her sister  young of metastatic breast cancer.  She wanted to know if she has any other reasons to have metastatic breast cancer in terms of symptoms. Currently was being treated for rheumatoid arthritis with methotrexate and Plaquenil.  She also has been evaluated for possible hyperparathyroidism.  She complained of chronic fatigue, dyspnea on exertion.  She also had some epigastric pain.  She  "denied any bright red blood per rectum or hematemesis.  She also described bone pain.  She never had a bone density scan.  When we saw the patient, we recommended adjuvant radiation therapy.  The patient refused.  We felt given her ER positivity that she would be a candidate for aromatase inhibitor therapy, which has shown to reduce development of invasive breast cancer in the ipsilateral breast as well as contralateral breast.  We did discuss that DCIS rarely metastasizes, but she was adamant about getting a PET scan due to her family history, her complaints of shortness of breath, bone pain and abdominal pain.  We went ahead and ordered a PET scan.  This was done on 06/08/2022 and the findings were there were small pulmonary nodules up to 4 mm.  There was no previous CT to compare this.  They were not hypermetabolic.  It was recommended she repeat CT chest in 6 months.  The rest of the PET scan was essentially negative for hypermetabolism.  She also had a bone density scan on 06/08/2022.  It was read as osteopenia with the lowest T-score being -2.3 in the right femoral neck. FRAX score for major osteoporotic fracture was 22.7%.  FRAX score for excoriated fracture was 8.5%.  When we saw her on 06/15/2020, she had read up on aromatase inhibitor, Arimidex, and did not want to deal with the side effects, did not want to have hot flashes or osteoporosis.  We counseled her that we could still treat the osteopenia, osteoporosis and still continue the aromatase inhibitor.  The patient refused.  She wanted to \"take her chances,\" she said.  We elected to repeat the CT chest to assess pulmonary nodules and this was done on 12/19 and essentially the  lung nodules were stable and were all less than 4 mm and felt to be benign.  The patient is a nonsmoker, so therefore, these are likely benign nodules as she had never smoked in her life or had been exposed to secondhand smoke.  Otherwise, she continues to refuse aromatase " chemotherapy.  No complaints of shortness of breath, chest pain, breast pain, breast discharge, fevers, night sweats, weight loss.    PHYSICAL EXAMINATION:    GENERAL:  She is an elderly white female in no acute distress.  ECOG performance status is 0.  VITAL SIGNS:  Blood pressure 120/70, pulse 80, respirations 18, temperature 97.5.  HEENT:  Atraumatic, normocephalic.  Oropharynx nonerythematous.  NECK:  Supple.  LUNGS:  Clear to auscultation and percussion.  HEART:  Regular rhythm.  S1, S2 normal.  BREASTS:  Exam was deferred.  ABDOMEN:  Soft. Normoactive bowel sounds.  No mass, nontender.  LYMPHATICS:  No cervical, supraclavicular, axillary or inguinal nodes.  EXTREMITIES: No edema.  NEUROLOGIC:  Nonfocal.    LABORATORY DATA:  CBC: White count 5.8.  H and H are 13.1 and 38.7.  Platelet count is 244.  Sed rate is 12.  BUN 16.6, creatinine 0.94,  .  CA 27-29 is 14.    IMPRESSION:    1.  Stage 0 malignant neoplasm of the right breast, consistent with grade 2 DCIS 1.9 cm mass, status post lumpectomy.  The patient refused adjuvant radiation therapy.  PET scan was essentially negative except for multiple nonspecific pulmonary nodules that were not hypermetabolic.  We offered the patient aromatase chemotherapy; she refused.  Bone density scan did show osteopenia.  We recommended vitamin D and calcium.  She refused aromatase chemotherapy.  She did not want to go through hot flashes and was willing to take a chance even though it certainly could reduce the risk of developing breast cancer in the ipsilateral breast.  She would like to continue mammograms on a yearly basis, repeat CT.  2.  Pulmonary nodules.  Repeat CT chest was stable.  The patient is a nonsmoker.  Therefore, these are benign nodules.  No need for further workup.    PLAN:    1.  At this point is to see the patient after mammograms in March, obtain CBC, CMP, LDH, CA 27-29.  2.  Rheumatoid arthritis as per Dr. Nestor Ceballos.    We spent 72 minutes on  this patient.  We spent time reviewing physician provider notes, imaging results with the patient, performing a history and physical, documenting history and physical, ordering followup labs and mammograms.    Karen Justin MD        D: 2022   T: 2022   MT: [id number]    Name:     MALA GROSSMAN  MRN:      [id number]        Account:    [id number]   :      1940           Visit Date: 2022     Document: [id number]    cc:  DO Betina Brock MD Rosetta K. Villeneuve, NP

## 2022-12-21 NOTE — PATIENT INSTRUCTIONS
Follow up with Kimberly Mullins NP in March       Please come prior for lab work.     You will need imaging done prior to your next visit.  Radiology scheduling will contact you to arrange these scans.  If you have not received a call in the next 2 weeks, please call them.  For MRIs call [phone] and all other scans call [phone].

## 2022-12-21 NOTE — NURSING NOTE
Chief Complaint   Patient presents with     Oncology Clinic Visit     DCIS & pulmonary nodules     Medication Reconciliation: complete    Suni Lozoya CMA ([facility])

## 2023-01-18 ENCOUNTER — TRANSFERRED RECORDS (OUTPATIENT)
Dept: HEALTH INFORMATION MANAGEMENT | Facility: OTHER | Age: 83
End: 2023-01-18
Payer: COMMERCIAL

## 2023-02-28 ENCOUNTER — OFFICE VISIT (OUTPATIENT)
Dept: INTERNAL MEDICINE | Facility: OTHER | Age: 83
End: 2023-02-28
Attending: NURSE PRACTITIONER
Payer: COMMERCIAL

## 2023-02-28 VITALS
OXYGEN SATURATION: 96 % | DIASTOLIC BLOOD PRESSURE: 78 MMHG | BODY MASS INDEX: 26.16 KG/M2 | TEMPERATURE: 97.7 F | RESPIRATION RATE: 12 BRPM | SYSTOLIC BLOOD PRESSURE: 118 MMHG | WEIGHT: 157 LBS | HEART RATE: 79 BPM | HEIGHT: 65 IN

## 2023-02-28 DIAGNOSIS — H61.91 LESION OF SKIN OF RIGHT EAR: Primary | ICD-10-CM

## 2023-02-28 PROCEDURE — G0463 HOSPITAL OUTPT CLINIC VISIT: HCPCS

## 2023-02-28 PROCEDURE — 99213 OFFICE O/P EST LOW 20 MIN: CPT | Performed by: NURSE PRACTITIONER

## 2023-02-28 ASSESSMENT — PAIN SCALES - GENERAL: PAINLEVEL: MILD PAIN (2)
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
## 2023-02-28 NOTE — NURSING NOTE
Patient presents to clinic today for sore spot on right ear.   Medication review completed.    Advanced Care Planning discussed/reviewed.    FOOD SECURITY SCREENING QUESTIONS    The next two questions are to help us understand your food security.  If you are feeling you need any assistance in this area, we have resources available to support you today.    Hunger Vital Signs:  Within the past 12 months we worried whether our food would run out before we got money to buy more. Never  Within the past 12 months the food we bought just didn't last and we didn't have money to get more. Never    Cyndi Polo CMA(AAMA)..................2/28/2023   8:34 AM

## 2023-03-14 ENCOUNTER — LAB (OUTPATIENT)
Dept: LAB | Facility: OTHER | Age: 83
End: 2023-03-14
Attending: INTERNAL MEDICINE
Payer: MEDICARE

## 2023-03-14 ENCOUNTER — HOSPITAL ENCOUNTER (OUTPATIENT)
Dept: MAMMOGRAPHY | Facility: OTHER | Age: 83
Discharge: HOME | End: 2023-03-14
Attending: INTERNAL MEDICINE
Payer: MEDICARE

## 2023-03-14 DIAGNOSIS — Z79.899 ENCOUNTER FOR LONG-TERM (CURRENT) USE OF MEDICATIONS: ICD-10-CM

## 2023-03-14 DIAGNOSIS — Z17.0 MALIGNANT NEOPLASM OF CENTRAL PORTION OF RIGHT BREAST IN FEMALE, ESTROGEN RECEPTOR POSITIVE (H): ICD-10-CM

## 2023-03-14 DIAGNOSIS — C50.111 MALIGNANT NEOPLASM OF CENTRAL PORTION OF RIGHT BREAST IN FEMALE, ESTROGEN RECEPTOR POSITIVE (H): ICD-10-CM

## 2023-03-14 DIAGNOSIS — M05.79 SEROPOSITIVE RHEUMATOID ARTHRITIS OF MULTIPLE SITES (H): ICD-10-CM

## 2023-03-14 LAB
ALBUMIN SERPL BCG-MCNC: 4.3 G/DL (ref 3.5–5.2)
ALP SERPL-CCNC: 87 U/L (ref 35–104)
ALT SERPL W P-5'-P-CCNC: 26 U/L (ref 10–35)
ANION GAP SERPL CALCULATED.3IONS-SCNC: 7 MMOL/L (ref 7–15)
AST SERPL W P-5'-P-CCNC: 29 U/L (ref 10–35)
BASOPHILS # BLD AUTO: 0.1 10E3/UL (ref 0–0.2)
BASOPHILS NFR BLD AUTO: 2 %
BILIRUB SERPL-MCNC: 0.4 MG/DL
BUN SERPL-MCNC: 16.3 MG/DL (ref 8–23)
CALCIUM SERPL-MCNC: 10 MG/DL (ref 8.8–10.2)
CANCER AG15-3 SERPL-ACNC: 11 U/ML
CHLORIDE SERPL-SCNC: 103 MMOL/L (ref 98–107)
CREAT SERPL-MCNC: 1 MG/DL (ref 0.51–0.95)
CRP SERPL-MCNC: <3 MG/L
DEPRECATED HCO3 PLAS-SCNC: 30 MMOL/L (ref 22–29)
EOSINOPHIL # BLD AUTO: 0.2 10E3/UL (ref 0–0.7)
EOSINOPHIL NFR BLD AUTO: 4 %
ERYTHROCYTE [DISTWIDTH] IN BLOOD BY AUTOMATED COUNT: 13.6 % (ref 10–15)
ERYTHROCYTE [SEDIMENTATION RATE] IN BLOOD BY WESTERGREN METHOD: 12 MM/HR (ref 0–30)
GFR SERPL CREATININE-BSD FRML MDRD: 56 ML/MIN/1.73M2
GLUCOSE SERPL-MCNC: 78 MG/DL (ref 70–99)
HCT VFR BLD AUTO: 40.9 % (ref 35–47)
HGB BLD-MCNC: 13.4 G/DL (ref 11.7–15.7)
IMM GRANULOCYTES # BLD: 0 10E3/UL
IMM GRANULOCYTES NFR BLD: 0 %
LDH SERPL L TO P-CCNC: 196 U/L (ref 0–250)
LYMPHOCYTES # BLD AUTO: 1 10E3/UL (ref 0.8–5.3)
LYMPHOCYTES NFR BLD AUTO: 23 %
MCH RBC QN AUTO: 32.4 PG (ref 26.5–33)
MCHC RBC AUTO-ENTMCNC: 32.8 G/DL (ref 31.5–36.5)
MCV RBC AUTO: 99 FL (ref 78–100)
MONOCYTES # BLD AUTO: 0.5 10E3/UL (ref 0–1.3)
MONOCYTES NFR BLD AUTO: 11 %
NEUTROPHILS # BLD AUTO: 2.6 10E3/UL (ref 1.6–8.3)
NEUTROPHILS NFR BLD AUTO: 60 %
NRBC # BLD AUTO: 0 10E3/UL
NRBC BLD AUTO-RTO: 0 /100
PLATELET # BLD AUTO: 251 10E3/UL (ref 150–450)
POTASSIUM SERPL-SCNC: 4.3 MMOL/L (ref 3.4–5.3)
PROT SERPL-MCNC: 7.4 G/DL (ref 6.4–8.3)
RBC # BLD AUTO: 4.13 10E6/UL (ref 3.8–5.2)
SODIUM SERPL-SCNC: 140 MMOL/L (ref 136–145)
WBC # BLD AUTO: 4.3 10E3/UL (ref 4–11)

## 2023-03-14 PROCEDURE — 85652 RBC SED RATE AUTOMATED: CPT | Mod: ZL

## 2023-03-14 PROCEDURE — 36415 COLL VENOUS BLD VENIPUNCTURE: CPT | Mod: ZL

## 2023-03-14 PROCEDURE — 77062 BREAST TOMOSYNTHESIS BI: CPT

## 2023-03-14 PROCEDURE — 85025 COMPLETE CBC W/AUTO DIFF WBC: CPT | Mod: ZL

## 2023-03-14 PROCEDURE — 86300 IMMUNOASSAY TUMOR CA 15-3: CPT | Mod: ZL

## 2023-03-14 PROCEDURE — 80053 COMPREHEN METABOLIC PANEL: CPT | Mod: ZL

## 2023-03-14 PROCEDURE — 86140 C-REACTIVE PROTEIN: CPT | Mod: ZL

## 2023-03-14 PROCEDURE — 83615 LACTATE (LD) (LDH) ENZYME: CPT | Mod: ZL

## 2023-03-21 ENCOUNTER — ONCOLOGY VISIT (OUTPATIENT)
Dept: ONCOLOGY | Facility: OTHER | Age: 83
End: 2023-03-21
Attending: NURSE PRACTITIONER
Payer: COMMERCIAL

## 2023-03-21 VITALS
WEIGHT: 159 LBS | RESPIRATION RATE: 16 BRPM | TEMPERATURE: 97.9 F | SYSTOLIC BLOOD PRESSURE: 134 MMHG | BODY MASS INDEX: 26.26 KG/M2 | DIASTOLIC BLOOD PRESSURE: 88 MMHG | HEART RATE: 75 BPM | OXYGEN SATURATION: 96 %

## 2023-03-21 DIAGNOSIS — R91.8 PULMONARY NODULES: ICD-10-CM

## 2023-03-21 DIAGNOSIS — Z17.0 MALIGNANT NEOPLASM OF CENTRAL PORTION OF RIGHT BREAST IN FEMALE, ESTROGEN RECEPTOR POSITIVE (H): ICD-10-CM

## 2023-03-21 DIAGNOSIS — D05.11 DUCTAL CARCINOMA IN SITU (DCIS) OF RIGHT BREAST: ICD-10-CM

## 2023-03-21 DIAGNOSIS — C50.111 MALIGNANT NEOPLASM OF CENTRAL PORTION OF RIGHT BREAST IN FEMALE, ESTROGEN RECEPTOR POSITIVE (H): ICD-10-CM

## 2023-03-21 PROCEDURE — G0463 HOSPITAL OUTPT CLINIC VISIT: HCPCS

## 2023-03-21 PROCEDURE — 99214 OFFICE O/P EST MOD 30 MIN: CPT | Performed by: NURSE PRACTITIONER

## 2023-03-21 RX ORDER — MAGNESIUM OXIDE/MAG AA CHELATE 300 MG
1 CAPSULE ORAL DAILY
COMMUNITY

## 2023-03-21 ASSESSMENT — PAIN SCALES - GENERAL: PAINLEVEL: MODERATE PAIN (5)

## 2023-03-21 NOTE — NURSING NOTE
Chief Complaint   Patient presents with     Oncology Clinic Visit     F/U DCIS     Medication Reconciliation: complete    Suni Lozoya CMA ([facility])

## 2023-03-21 NOTE — PROGRESS NOTES
Oncology Follow-up Visit:  2023  Diagnosis:Breast cancer    History Of Present Illness:  Patient presents for followup of DCIS.  Patient was seen in consultation at the request of Dr. Cifuentes and Hillary Quinn, nurse practitioner, on 2022 to evaluate concerning diagnosis of DCIS of the right breast.  Apparently, she had undergone routine mammography and was found to have an abnormal mammogram with microcalcification in the right breast.  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  HER-2/tommy was not tested.  She was ultimately seen by Dr. Betina Cifuentes who proceeded with right breast lumpectomy performed on 2022 and the findings were that the patient had DCIS, nuclear grade 2, cribriform type.  Size was 1.9 cm.  Margins were negative.  Estrogen receptor was positive at % and was negative for invasive malignancy.  In terms of risk factors of breast cancer, she had 2 sisters with breast cancer, 1  of metastatic breast cancer in her 40s.  She also had an aunt with breast cancer.  Other risk factors included that she had 3 children.  The first child was born at age 28 and the last 2 at age 33 and she  them all.  She also underwent menopause at age 55.  She was on at least 5 years of hormone replacement therapy due to hot flashes.  She was a nonsmoker, nondrinker.  When we saw the patient, we recommended adjuvant radiation therapy.  The patient refused. It was felt given her ER positivity that she would be a candidate for aromatase inhibitor therapy, which has shown to reduce development of invasive breast cancer in the ipsilateral breast as well as contralateral breast.  PET scan was done on 2022 and the findings were there were small pulmonary nodules up to 4 mm.  There was no previous CT to compare this.  They were not hypermetabolic.  It was recommended she repeat CT chest in 6 months.  The rest of  the PET scan was essentially negative for hypermetabolism.  She also had a bone density scan on 06/08/2022.  It was read as osteopenia with the lowest T-score being -2.3 in the right femoral neck. FRAX score for major osteoporotic fracture was 22.7%.  FRAX score for excoriated fracture was 8.5%.  When patient was seen on 06/15/2020, she had read up on aromatase inhibitor, Arimidex, and did not want to deal with the side effects, did not want to have hot flashes or osteoporosis. We elected to repeat the CT chest to assess pulmonary nodules and this was done on 12/19 and essentially the  lung nodules were stable and were all less than 4 mm and felt to be benign.  The patient is a nonsmoker, so therefore, these are likely benign nodules as she had never smoked in her life or had been exposed to secondhand smoke. Patient continued to refuse aromatase inhibitor. Mammogram was done on 3/14/23 and this was negative for malignancy. Labs done on 3/14/23 shows a normal tumor marker. Al other labs are stable. Patient reports she had pain behind her right knee. She was seen by her chiropractor and states the pain has improved. She has no pain redness or swelling at this time. Patient has no other new concerns at this time.      Review Of Systems:  Review Of Systems  Eyes/Ears/Nose/Throat: denies new vision changes  Respiratory: reports shortness of breath with exertion, denies cough  Cardiovascular: denies chest pain  Gastrointestinal: reports constipation, taking magnesium.   Genitourinary:denies dysuria or hematuria  Musculoskeletal: reports arthritis pain, no new bone pain  Neurologic: reports chronic headaches, occasional dizziness with position changes  Hematologic/Lymphatic/Immunologic: denies fevers, no recent illness      Nursing Notes:   Suni Lozoya, [facility]  3/21/2023  9:12 AM  Sign at exiting of workspace  Chief Complaint   Patient presents with    Oncology Clinic Visit     F/U DCIS     Medication Reconciliation:  complete    Suni Lozoya CMA ([facility])      Past medical, social, surgical, and family histories reviewed.    Allergies:  Allergies as of 03/21/2023 - Reviewed 03/21/2023   Allergen Reaction Noted    Codeine Nausea 11/13/2012    Excedrin back & [acetaminophen-aspirin buffered] Other (See Comments) 03/12/2021    Atorvastatin Muscle Pain (Myalgia) 04/20/2021    Augmentin  03/20/2020    Crestor [rosuvastatin]  03/08/2022    Simvastatin  03/08/2022    Sulfa drugs  10/28/2019    Tramadol Other (See Comments) 03/30/2022       Current Medications:  Current Outpatient Medications   Medication Sig Dispense Refill    ascorbic acid (VITAMIN C) 1000 MG TABS Take 1,000 mg by mouth daily  30 tablet     aspirin (ASA) 81 MG EC tablet Take 1 tablet (81 mg) by mouth daily      CALCIUM CARBONATE-VIT D-MIN PO Take 1 capsule by mouth 2 times daily       cycloSPORINE (RESTASIS) 0.05 % ophthalmic emulsion Place 1 drop into both eyes 2 times daily       Flaxseed Oil OIL Take 1 capsule by mouth daily       folic acid (FOLVITE) 1 MG tablet Take 2 mg by mouth daily       Ginger, Zingiber officinalis, (GINGER EXTRACT) 250 MG CAPS Take 1 capsule by mouth daily 120 capsule     Lactobacillus (PROBIOTIC ACIDOPHILUS PO) Take 1 capsule by mouth as needed       Magnesium 300 MG CAPS Take 1 capsule by mouth daily      methotrexate 50 MG/2ML injection Inject 15 mg Subcutaneous every 7 days Takes on Mondays      Multiple Vitamin (MULTIVITAMIN ADULT PO) Take 1 tablet by mouth daily      Omega-3 Fatty Acids (SALMON OIL-1000 PO) Take 1 capsule by mouth 2 times daily       Turmeric Curcumin 500 MG CAPS Take 500 mg by mouth daily       VOLTAREN 1 % GEL topical gel Apply to joints as needed.      acetaminophen (TYLENOL) 325 MG tablet Take 325 mg by mouth every 6 hours as needed for mild pain UNKNOWN DOSE      metroNIDAZOLE (METROGEL) 0.75 % external gel Apply topically daily 45 g 3    triamcinolone (ARISTOCORT HP) 0.5 % external cream Apply sparingly to  affected area two times daily. 30 g 3    vitamin E (TOCOPHEROL) 400 units (180 mg) capsule Take 400 Units by mouth daily (Patient not taking: Reported on 12/21/2022)          Physical Exam:  /88   Pulse 75   Temp 97.9  F (36.6  C) (Tympanic)   Resp 16   Wt 72.1 kg (159 lb)   SpO2 96%   BMI 26.26 kg/m      GENERAL APPEARANCE: 82 year old female, alert and no distress     NECK: no adenopathy, no asymmetry or masses     LYMPHATICS: No cervical, supraclavicular, axillary lymphadenopathy     RESP: lungs clear to auscultation - no rales, rhonchi or wheezes     CARDIOVASCULAR: regular rates and rhythm, normal S1 S2     ABDOMEN:  soft, nontender,  bowel sounds normal     MUSCULOSKELETAL: extremities normal- no gross deformities noted, No edema b/l LE.     SKIN: no suspicious lesions or rashes on exposed skin     PSYCHIATRIC: mentation appears normal and affect normal    Laboratory/Imaging Studies  Lab on 03/14/2023   Component Date Value Ref Range Status    Cancer Antigen 15-3 03/14/2023 11  <=25 U/mL Final    This result is obtained using the Roche Elecsys CA 15-3 II method on the jorge [id number] immunoassay analyzer. Results obtained with different assay methods or kits cannot be used interchangeably.    Lactate Dehydrogenase 03/14/2023 196  0 - 250 U/L Final    Sodium 03/14/2023 140  136 - 145 mmol/L Final    Potassium 03/14/2023 4.3  3.4 - 5.3 mmol/L Final    Chloride 03/14/2023 103  98 - 107 mmol/L Final    Carbon Dioxide (CO2) 03/14/2023 30 (H)  22 - 29 mmol/L Final    Anion Gap 03/14/2023 7  7 - 15 mmol/L Final    Urea Nitrogen 03/14/2023 16.3  8.0 - 23.0 mg/dL Final    Creatinine 03/14/2023 1.00 (H)  0.51 - 0.95 mg/dL Final    Calcium 03/14/2023 10.0  8.8 - 10.2 mg/dL Final    Glucose 03/14/2023 78  70 - 99 mg/dL Final    Alkaline Phosphatase 03/14/2023 87  35 - 104 U/L Final    AST 03/14/2023 29  10 - 35 U/L Final    ALT 03/14/2023 26  10 - 35 U/L Final    Protein Total 03/14/2023 7.4  6.4 - 8.3 g/dL Final     Albumin 03/14/2023 4.3  3.5 - 5.2 g/dL Final    Bilirubin Total 03/14/2023 0.4  <=1.2 mg/dL Final    GFR Estimate 03/14/2023 56 (L)  >60 mL/min/1.73m2 Final    eGFR calculated using 2021 CKD-EPI equation.    Erythrocyte Sedimentation Rate 03/14/2023 12  0 - 30 mm/hr Final    CRP Inflammation 03/14/2023 <3.00  <5.00 mg/L Final    WBC Count 03/14/2023 4.3  4.0 - 11.0 10e3/uL Final    RBC Count 03/14/2023 4.13  3.80 - 5.20 10e6/uL Final    Hemoglobin 03/14/2023 13.4  11.7 - 15.7 g/dL Final    Hematocrit 03/14/2023 40.9  35.0 - 47.0 % Final    MCV 03/14/2023 99  78 - 100 fL Final    MCH 03/14/2023 32.4  26.5 - 33.0 pg Final    MCHC 03/14/2023 32.8  31.5 - 36.5 g/dL Final    RDW 03/14/2023 13.6  10.0 - 15.0 % Final    Platelet Count 03/14/2023 251  150 - 450 10e3/uL Final    % Neutrophils 03/14/2023 60  % Final    % Lymphocytes 03/14/2023 23  % Final    % Monocytes 03/14/2023 11  % Final    % Eosinophils 03/14/2023 4  % Final    % Basophils 03/14/2023 2  % Final    % Immature Granulocytes 03/14/2023 0  % Final    NRBCs per 100 WBC 03/14/2023 0  <1 /100 Final    Absolute Neutrophils 03/14/2023 2.6  1.6 - 8.3 10e3/uL Final    Absolute Lymphocytes 03/14/2023 1.0  0.8 - 5.3 10e3/uL Final    Absolute Monocytes 03/14/2023 0.5  0.0 - 1.3 10e3/uL Final    Absolute Eosinophils 03/14/2023 0.2  0.0 - 0.7 10e3/uL Final    Absolute Basophils 03/14/2023 0.1  0.0 - 0.2 10e3/uL Final    Absolute Immature Granulocytes 03/14/2023 0.0  <=0.4 10e3/uL Final    Absolute NRBCs 03/14/2023 0.0  10e3/uL Final        ASSESSMENT/PLAN:  1. DCIS. Stage 0 malignant neoplasm of the right breast, consistent with grade 2 DCIS 1.9 cm mass, status post lumpectomy.  The patient refused adjuvant radiation therapy.  PET scan was essentially negative except for multiple nonspecific pulmonary nodules that were not hypermetabolic.  We offered the patient aromatase inhibitor; she refused.  Bone density scan did show osteopenia.  We recommended vitamin D and  calcium.  She refused aromatase inhibitor therapy. Patient is aware of the risks of not taking aromatase inhibitor. Mammogram was done on 3/14/23 and this was negative for malignancy. Labs done on 3/14/23 shows a normal tumor marker. Al other labs are stable. Will see patient in 4 months with repeat labs    2.  Pulmonary nodules.  Repeat CT chest was stable. It was felt that these are benign nodules.  No need for further workup per Dr Justin.    Thirty three minutes spent with this encounter with time spent reviewing patient records, counseling patient regarding disease process, interpretation and review of labs with patient, discussing aromatase inhibitor therapy, discussing plan for ongoing surveillance, obtaining a review of systems, performing a physical exam, ordering tests, documenting in EHR and coordination of care

## 2023-03-27 ENCOUNTER — OFFICE VISIT (OUTPATIENT)
Dept: INTERNAL MEDICINE | Facility: OTHER | Age: 83
End: 2023-03-27
Attending: NURSE PRACTITIONER
Payer: COMMERCIAL

## 2023-03-27 VITALS
HEART RATE: 82 BPM | DIASTOLIC BLOOD PRESSURE: 74 MMHG | BODY MASS INDEX: 26.06 KG/M2 | SYSTOLIC BLOOD PRESSURE: 132 MMHG | WEIGHT: 156.4 LBS | TEMPERATURE: 97 F | OXYGEN SATURATION: 95 % | HEIGHT: 65 IN | RESPIRATION RATE: 16 BRPM

## 2023-03-27 DIAGNOSIS — D05.11 DUCTAL CARCINOMA IN SITU (DCIS) OF RIGHT BREAST: ICD-10-CM

## 2023-03-27 DIAGNOSIS — M17.0 PRIMARY OSTEOARTHRITIS OF BOTH KNEES: ICD-10-CM

## 2023-03-27 DIAGNOSIS — M35.00 SJOGREN'S SYNDROME, WITH UNSPECIFIED ORGAN INVOLVEMENT (H): ICD-10-CM

## 2023-03-27 DIAGNOSIS — N18.31 STAGE 3A CHRONIC KIDNEY DISEASE (H): ICD-10-CM

## 2023-03-27 DIAGNOSIS — Z00.00 ENCOUNTER FOR MEDICARE ANNUAL WELLNESS EXAM: Primary | ICD-10-CM

## 2023-03-27 DIAGNOSIS — M05.79 SEROPOSITIVE RHEUMATOID ARTHRITIS OF MULTIPLE SITES (H): ICD-10-CM

## 2023-03-27 DIAGNOSIS — E78.49 OTHER HYPERLIPIDEMIA: ICD-10-CM

## 2023-03-27 PROCEDURE — G0463 HOSPITAL OUTPT CLINIC VISIT: HCPCS

## 2023-03-27 PROCEDURE — G0439 PPPS, SUBSEQ VISIT: HCPCS | Performed by: NURSE PRACTITIONER

## 2023-03-27 ASSESSMENT — ENCOUNTER SYMPTOMS
POLYPHAGIA: 0
PALPITATIONS: 0
CHILLS: 0
ARTHRALGIAS: 1
LIGHT-HEADEDNESS: 1
SORE THROAT: 0
ANAL BLEEDING: 0
CONFUSION: 0
HEMATOCHEZIA: 0
SHORTNESS OF BREATH: 1
UNEXPECTED WEIGHT CHANGE: 0
TROUBLE SWALLOWING: 0
FEVER: 0
NERVOUS/ANXIOUS: 0
DYSPHORIC MOOD: 0
COUGH: 0
ABDOMINAL PAIN: 0
BREAST MASS: 0
WOUND: 0
HEARTBURN: 0
DIZZINESS: 0
POLYDIPSIA: 0

## 2023-03-27 ASSESSMENT — PAIN SCALES - GENERAL: PAINLEVEL: MODERATE PAIN (4)

## 2023-03-27 ASSESSMENT — ACTIVITIES OF DAILY LIVING (ADL): CURRENT_FUNCTION: NO ASSISTANCE NEEDED

## 2023-03-27 NOTE — NURSING NOTE
"Chief Complaint   Patient presents with     Wellness Visit       FOOD SECURITY SCREENING QUESTIONS  Hunger Vital Signs:  Within the past 12 months we worried whether our food would run out before we got money to buy more. Never  Within the past 12 months the food we bought just didn't last and we didn't have money to get more. Never  Chayito Azul LPN 3/27/2023 9:29 AM      Initial /74 (BP Location: Right arm, Patient Position: Sitting, Cuff Size: Adult Regular)   Pulse 82   Temp 97  F (36.1  C) (Tympanic)   Resp 16   Ht 1.655 m (5' 5.16\")   Wt 70.9 kg (156 lb 6.4 oz)   SpO2 95%   BMI 25.90 kg/m   Estimated body mass index is 25.9 kg/m  as calculated from the following:    Height as of this encounter: 1.655 m (5' 5.16\").    Weight as of this encounter: 70.9 kg (156 lb 6.4 oz).  Medication Reconciliation: complete    Chayito Azul LPN  "

## 2023-03-27 NOTE — PATIENT INSTRUCTIONS
Patient Education   Personalized Prevention Plan  You are due for the preventive services outlined below.  Your care team is available to assist you in scheduling these services.  If you have already completed any of these items, please share that information with your care team to update in your medical record.  Health Maintenance Due   Topic Date Due     Zoster (Shingles) Vaccine (1 of 2) Never done     Diptheria Tetanus Pertussis (DTAP/TDAP/TD) Vaccine (1 - Tdap) 06/15/2005     COVID-19 Vaccine (2 - José risk series) 01/05/2022     Flu Vaccine (1) 09/01/2022     Cholesterol Lab  01/11/2023     Your Health Risk Assessment indicates you feel you are not in good health    A healthy lifestyle helps keep the body fit and the mind alert. It helps protect you from disease, helps you fight disease, and helps prevent chronic disease (disease that doesn't go away) from getting worse. This is important as you get older and begin to notice twinges in muscles and joints and a decline in the strength and stamina you once took for granted. A healthy lifestyle includes good healthcare, good nutrition, weight control, recreation, and regular exercise. Avoid harmful substances and do what you can to keep safe. Another part of a healthy lifestyle is stay mentally active and socially involved.    Good healthcare     Have a wellness visit every year.     If you have new symptoms, let us know right away. Don't wait until the next checkup.     Take medicines exactly as prescribed and keep your medicines in a safe place. Tell us if your medicine causes problems.   Healthy diet and weight control     Eat 3 or 4 small, nutritious, low-fat, high-fiber meals a day. Include a variety of fruits, vegetables, and whole-grain foods.     Make sure you get enough calcium in your diet. Calcium, vitamin D, and exercise help prevent osteoporosis (bone thinning).     If you live alone, try eating with others when you can. That way you get a good  meal and have company while you eat it.     Try to keep a healthy weight. If you eat more calories than your body uses for energy, it will be stored as fat and you will gain weight.     Recreation   Recreation is not limited to sports and team events. It includes any activity that provides relaxation, interest, enjoyment, and exercise. Recreation provides an outlet for physical, mental, and social energy. It can give a sense of worth and achievement. It can help you stay healthy.    Mental Exercise and Social Involvement  Mental and emotional health is as important as physical health. Keep in touch with friends and family. Stay as active as possible. Continue to learn and challenge yourself.   Things you can do to stay mentally active are:    Learn something new, like a foreign language or musical instrument.     Play SCRABBLE or do crossword puzzles. If you cannot find people to play these games with you at home, you can play them with others on your computer through the Internet.     Join a games club--anything from card games to chess or checkers or lawn bowling.     Start a new hobby.     Go back to school.     Volunteer.     Read.   Keep up with world events.

## 2023-03-27 NOTE — PROGRESS NOTES
"SUBJECTIVE:   Verena is a 82 year old who presents for Preventive Visit.  No flowsheet data found.  Patient has been advised of split billing requirements and indicates understanding: Yes  Are you in the first 12 months of your Medicare coverage?  No    She is here today for Medicare wellness visit and chronic disease management.  She is followed by rheumatology for seronegative RA and Sjogren's syndrome.  Also followed by oncology for DCIS right breast.  She has had lab work through specialty recently.  She would prefer not to have fasting lipids since she is intolerant to statins.  She does not want any immunization updates.  She had bone density scan in 2022 with osteopenia.  She had been treated with Fosamax several years ago.  She prefers to take as few medications as possible.  She had mammogram earlier this year.  Reports she has had bilateral knee pain.  Left worse than right recently but normally the right is worse than the left.  She states she had a flare of the left knee pain a couple of weeks ago and that has now resolved.  She did have x-rays of both of her knees by rheumatology last year.  Osteoarthritis and some spurring was noted.  She uses Voltaren gel only as needed.  She is on methotrexate as prescribed by rheumatologist.    Healthy Habits:     In general, how would you rate your overall health?  Fair    Frequency of exercise:  2-3 days/week    Duration of exercise:  15-30 minutes    Do you usually eat at least 4 servings of fruit and vegetables a day, include whole grains    & fiber and avoid regularly eating high fat or \"junk\" foods?  Yes    Taking medications regularly:  Yes    Medication side effects:  None    Ability to successfully perform activities of daily living:  No assistance needed    Home Safety:  No safety concerns identified    Hearing Impairment:  No hearing concerns    In the past 6 months, have you been bothered by leaking of urine?  No    In general, how would you rate your " overall mental or emotional health?  Good      PHQ-2 Total Score: 1    Additional concerns today:  No      Have you ever done Advance Care Planning? (For example, a Health Directive, POLST, or a discussion with a medical provider or your loved ones about your wishes): Yes, patient states has an Advance Care Planning document and will bring a copy to the clinic.       Fall risk  Fallen 2 or more times in the past year?: No  Any fall with injury in the past year?: No    Cognitive Screening   1) Repeat 3 items (Leader, Season, Table)    2) Clock draw: NORMAL  3) 3 item recall: Recalls 3 objects  Results: 3 items recalled: COGNITIVE IMPAIRMENT LESS LIKELY    Mini-CogTM Copyright S [employer]. Licensed by the author for use in [facility]; reprinted with permission ([email or web address]@[email or web address]). All rights reserved.      Do you have sleep apnea, excessive snoring or daytime drowsiness?: no    Reviewed and updated as needed this visit by clinical staff   Tobacco  Allergies  Meds      Soc Hx        Reviewed and updated as needed this visit by Provider                 Social History     Tobacco Use     Smoking status: Never     Passive exposure: Never     Smokeless tobacco: Never     Tobacco comments:     no ecig   Substance Use Topics     Alcohol use: No     Alcohol/week: 0.0 standard drinks         Alcohol Use 3/27/2023   Prescreen: >3 drinks/day or >7 drinks/week? Not Applicable     Do you have a current opioid prescription? No  Do you use any other controlled substances or medications that are not prescribed by a provider? None          Current providers sharing in care for this patient include:   Patient Care Team:  Kaia Quinn NP as PCP - General (Nurse Practitioner)  Kaia Quinn NP as Assigned PCP  Betina Cifuentes MD as Referring Physician (Surgery)  Betina Cifuentes MD as Assigned Surgical Provider  Karen Justin MD as Assigned Cancer Care Provider    The following health maintenance items are  reviewed in Epic and correct as of today:  Health Maintenance   Topic Date Due     ZOSTER IMMUNIZATION (1 of 2) Never done     DTAP/TDAP/TD IMMUNIZATION (1 - Tdap) 06/15/2005     COVID-19 Vaccine (2 - José risk series) 01/05/2022     INFLUENZA VACCINE (1) 09/01/2022     LIPID  01/11/2023     BMP  03/14/2024     HEMOGLOBIN  03/14/2024     MEDICARE ANNUAL WELLNESS VISIT  03/27/2024     FALL RISK ASSESSMENT  03/27/2024     ADVANCE CARE PLANNING  03/27/2028     DEXA  06/08/2037     PHQ-2 (once per calendar year)  Completed     Pneumococcal Vaccine: 65+ Years  Completed     URINALYSIS  Completed     IPV IMMUNIZATION  Aged Out     MENINGITIS IMMUNIZATION  Aged Out     MICROALBUMIN  Discontinued     Labs reviewed in EPIC      Mammogram Screening - Alternate mammogram schedule due to breast cancer history followed by oncology  Pertinent mammograms are reviewed under the imaging tab.    Review of Systems   Constitutional: Negative for chills, fever and unexpected weight change.   HENT: Negative for ear pain, sore throat and trouble swallowing.    Respiratory: Positive for shortness of breath. Negative for cough.         Chronic dyspnea, unchanged   Cardiovascular: Negative for chest pain, palpitations and peripheral edema.   Gastrointestinal: Negative for abdominal pain, anal bleeding, heartburn and hematochezia.   Endocrine: Negative for cold intolerance, heat intolerance, polydipsia, polyphagia and polyuria.   Breasts:  Negative for tenderness, breast mass and discharge.   Genitourinary: Negative for pelvic pain, vaginal bleeding and vaginal discharge.   Musculoskeletal: Positive for arthralgias and gait problem.        OA   Skin: Negative for rash and wound.   Allergic/Immunologic: Positive for immunocompromised state.   Neurological: Positive for light-headedness. Negative for dizziness and syncope.        Occ lightheaded with positional   Psychiatric/Behavioral: Negative for confusion and dysphoric mood. The patient  "is not nervous/anxious.          OBJECTIVE:   /74 (BP Location: Right arm, Patient Position: Sitting, Cuff Size: Adult Regular)   Pulse 82   Temp 97  F (36.1  C) (Tympanic)   Resp 16   Ht 1.655 m (5' 5.16\")   Wt 70.9 kg (156 lb 6.4 oz)   SpO2 95%   BMI 25.90 kg/m   Estimated body mass index is 25.9 kg/m  as calculated from the following:    Height as of this encounter: 1.655 m (5' 5.16\").    Weight as of this encounter: 70.9 kg (156 lb 6.4 oz).  Physical Exam  Pleasant female no acute distress.  Affect normal.  Alert and oriented x4.  Skin color pink.  Sclera nonicteric.  Conjunctiva noninflamed.  TMs clear.  Patient wearing facial mask secondary to pandemic but denies oral mucosal concerns.  Neck supple without adenopathy.  No thyromegaly.  No carotid bruit.  Lung fields clear to auscultation throughout.  Cardiovascular regular rate and rhythm with no murmur or S3.  Abdomen is soft and without masses, tenderness and organomegaly.  Extremities with trace edema.  DP PT is palpable.  Good range of motion of both knees no swelling or tenderness with palpation.        ASSESSMENT / PLAN:       ICD-10-CM    1. Encounter for Medicare annual wellness exam  Z00.00       2. Seropositive rheumatoid arthritis of multiple sites (H)  M05.79       3. Sjogren's syndrome, with unspecified organ involvement (H)  M35.00       4. Stage 3a chronic kidney disease (H)  N18.31       5. Other hyperlipidemia  E78.49       6. Ductal carcinoma in situ (DCIS) of right breast  D05.11       7. Primary osteoarthritis of both knees  M17.0         Plan:  Patient is up-to-date.  She deferred lipid evaluation.  Does not want any further treatment for osteopenia.  She will continue to follow with rheumatology and oncology.  Has osteoarthritis of both knees.  Recommend increasing Voltaren gel to twice daily to see if that provides better pain relief.  Follow-up if no improvement.  She declined immunization update.    Patient has been " "advised of split billing requirements and indicates understanding: Yes      COUNSELING:  Reviewed preventive health counseling, as reflected in patient instructions       Regular exercise       Healthy diet/nutrition       Vision screening       Dental care       Bladder control       Fall risk prevention       Alcohol Use        Osteoporosis prevention/bone health       Advanced Planning       BMI:   Estimated body mass index is 25.9 kg/m  as calculated from the following:    Height as of this encounter: 1.655 m (5' 5.16\").    Weight as of this encounter: 70.9 kg (156 lb 6.4 oz).         She reports that she has never smoked. She has never been exposed to tobacco smoke. She has never used smokeless tobacco.      Appropriate preventive services were discussed with this patient, including applicable screening as appropriate for cardiovascular disease, diabetes, osteopenia/osteoporosis, and glaucoma.  As appropriate for age/gender, discussed screening for colorectal cancer, prostate cancer, breast cancer, and cervical cancer. Checklist reviewing preventive services available has been given to the patient.    Reviewed patients plan of care and provided an AVS. The Basic Care Plan (routine screening as documented in Health Maintenance) for Verena meets the Care Plan requirement. This Care Plan has been established and reviewed with the Patient.          Kaia Qunin NP  [facility] AND [facility]    Identified Health Risks:    I have reviewed Opioid Use Disorder and Substance Use Disorder risk factors and made any needed referrals.       The patient was provided with suggestions to help her develop a healthy physical lifestyle.  "

## 2023-04-10 ENCOUNTER — HOSPITAL ENCOUNTER (OUTPATIENT)
Dept: ULTRASOUND IMAGING | Facility: OTHER | Age: 83
Discharge: HOME | End: 2023-04-10
Attending: FAMILY MEDICINE
Payer: MEDICARE

## 2023-04-10 ENCOUNTER — OFFICE VISIT (OUTPATIENT)
Dept: FAMILY MEDICINE | Facility: OTHER | Age: 83
End: 2023-04-10
Attending: FAMILY MEDICINE
Payer: MEDICARE

## 2023-04-10 VITALS
TEMPERATURE: 98.4 F | SYSTOLIC BLOOD PRESSURE: 118 MMHG | BODY MASS INDEX: 26.6 KG/M2 | HEART RATE: 80 BPM | WEIGHT: 160.6 LBS | RESPIRATION RATE: 16 BRPM | DIASTOLIC BLOOD PRESSURE: 72 MMHG | OXYGEN SATURATION: 93 %

## 2023-04-10 DIAGNOSIS — M79.89 LEFT LEG SWELLING: ICD-10-CM

## 2023-04-10 DIAGNOSIS — M71.22 SYNOVIAL CYST OF LEFT POPLITEAL SPACE: Primary | ICD-10-CM

## 2023-04-10 PROCEDURE — 93971 EXTREMITY STUDY: CPT | Mod: LT

## 2023-04-10 PROCEDURE — 99213 OFFICE O/P EST LOW 20 MIN: CPT | Performed by: FAMILY MEDICINE

## 2023-04-10 PROCEDURE — G0463 HOSPITAL OUTPT CLINIC VISIT: HCPCS | Mod: 25

## 2023-04-10 ASSESSMENT — PAIN SCALES - GENERAL: PAINLEVEL: MILD PAIN (3)

## 2023-04-10 ASSESSMENT — ENCOUNTER SYMPTOMS: LEG PAIN: 1

## 2023-04-10 NOTE — NURSING NOTE
Patient presents to clinic today for left leg pain that has been going on for approximately three weeks. Patient denies any known injury to the leg.     Medication Review: complete    /72   Pulse 80   Temp 98.4  F (36.9  C) (Tympanic)   Resp 16   Wt 72.8 kg (160 lb 9.6 oz)   LMP  (LMP Unknown)   SpO2 93%   Breastfeeding No   BMI 26.60 kg/m       FOOD SECURITY SCREENING QUESTIONS:  The next two questions are to help us understand your food security.  If you are feeling you need any assistance in this area, we have resources available to support you today.    Hunger Vital Signs:  Within the past 12 months we worried whether our food would run out before we got money to buy more. Never  Within the past 12 months the food we bought just didn't last and we didn't have money to get more. Never    Kimberly Murphy CNA on 4/10/2023 at 1:38 PM

## 2023-04-10 NOTE — PROGRESS NOTES
Nursing Notes:   Kimberly Murphy  4/10/2023  1:39 PM  Signed  Patient presents to clinic today for left leg pain that has been going on for approximately three weeks. Patient denies any known injury to the leg.     Medication Review: complete    /72   Pulse 80   Temp 98.4  F (36.9  C) (Tympanic)   Resp 16   Wt 72.8 kg (160 lb 9.6 oz)   LMP  (LMP Unknown)   SpO2 93%   Breastfeeding No   BMI 26.60 kg/m       FOOD SECURITY SCREENING QUESTIONS:  The next two questions are to help us understand your food security.  If you are feeling you need any assistance in this area, we have resources available to support you today.    Hunger Vital Signs:  Within the past 12 months we worried whether our food would run out before we got money to buy more. Never  Within the past 12 months the food we bought just didn't last and we didn't have money to get more. Never    Kimberly Murphy CNA on 4/10/2023 at 1:38 PM      Jacklyn Albarado is a 82 year old, presenting for the following health issues:  Leg Pain        4/10/2023     1:36 PM   Additional Questions   Roomed by Kimberly Albarado is here today for a complaint of left leg pain.  Has been present for 3 weeks.  Has swelling behind her knee and into her calf.  No recent travel or other immobilization.  Swelling worse since yesterday.  Swelling has extended into her ankle now.  Couldn't walk on it for a couple of days when it started.  Improved a bit since then, but has lingered.  No history of blood clots.  Toes were red last night, usually they are cold.  No shortness of breath.    Leg Pain    History of Present Illness       Reason for visit:  Leg and knee pain and swelling  Symptom onset:  3-4 weeks ago  Symptom intensity:  Moderate  Symptom progression:  Worsening  Had these symptoms before:  No  What makes it worse:  No  What makes it better:  No    She eats 4 or more servings of fruits and vegetables daily.She consumes 0 sweetened beverage(s) daily.She  exercises with enough effort to increase her heart rate 10 to 19 minutes per day.    She is taking medications regularly.           Review of Systems   Constitutional, HEENT, cardiovascular, pulmonary, GI, , musculoskeletal, neuro, skin, endocrine and psych systems are negative, except as otherwise noted.      Objective    /72   Pulse 80   Temp 98.4  F (36.9  C) (Tympanic)   Resp 16   Wt 72.8 kg (160 lb 9.6 oz)   LMP  (LMP Unknown)   SpO2 93%   Breastfeeding No   BMI 26.60 kg/m    Body mass index is 26.6 kg/m .  Physical Exam  Constitutional:       Appearance: Normal appearance.   HENT:      Head: Normocephalic and atraumatic.   Eyes:      Extraocular Movements: Extraocular movements intact.      Pupils: Pupils are equal, round, and reactive to light.   Musculoskeletal:      Comments: Left leg:  Effusion noted of knee.  No joint line tenderness.  There is fullness of the popliteal fossa region, which is mildly tender.  There is swelling of her left leg relative to her right.  Arslan's is negative.  Normal capillary refill of her left foot.   Neurological:      Mental Status: She is alert.        Results for orders placed or performed during the hospital encounter of 04/10/23   US Lower Extremity Venous Duplex Left     Status: None    Narrative    EXAMINATION: DOPPLER VENOUS ULTRASOUND OF THE LEFT LOWER EXTREMITY,  4/10/2023 2:35 PM     COMPARISON: Left lower extremity ultrasound 1/23/2013    HISTORY: Left leg swelling and pain    TECHNIQUE:  Gray-scale evaluation with compression, spectral flow, and  color Doppler assessment of the deep venous system of the left leg  from groin to the calf.    FINDINGS:  In the left lower extremity, the common femoral, femoral, popliteal  and posterior tibial veins demonstrate normal compressibility and  blood flow.    Thick-walled anechoic cyst in the popliteal fossa measuring 5.4 x 2.4  cm, consistent with Baker's cyst.      Impression    IMPRESSION:  No evidence  left lower extremity deep venous thrombosis.    MARLEE VASQUEZ MD         SYSTEM ID:  [id number]      Left knee x-ray from 5/11/22 reviewed as well:    PROCEDURE: XR KNEE LEFT 3 VIEWS 5/11/2022 9:36 AM     HISTORY: Hx of rheumatoid arthritis     COMPARISONS: None.     TECHNIQUE: 3 views.     FINDINGS: There is some generalized osteopenia. There is diffuse  narrowing of the patellofemoral joint. There is mild to moderate  narrowing of the medial joint space with small medial spurs. There is  mild narrowing of the lateral joint space. Small posterior patellar  spurs are seen.     No fracture or dislocation is seen. There is no focal bone lesion.                                                                        IMPRESSION: Degenerative change without acute abnormality.     SARA JAMES MD         Assessment & Plan     ICD-10-CM    1. Synovial cyst of left popliteal space  M71.22 Orthopedic  Referral      2. Left leg swelling  M79.89 US Lower Extremity Venous Duplex Left        1.  No DVT noted.  Exam and ultrasound findings consistent with politeal cyst causing symptoms.  Discussed that sometimes these can leak causing increased inflammation and swelling, which I suspect has happened to her. No signs of infection.  She is not able to take NSAIDs due to her methotrexate use.  Discussed using topical diclofenac.  She has not tolerated tylenol #3 or tramadol in the past and is therefore leery of trying anything stronger.  She is interested in seeing Orthopedics to discuss whether anything further could be done for this, so she is referred.  However, discussed that typically treatment of these cysts is supportive.         Mildred Luis MD  [facility] AND [facility]

## 2023-04-26 DIAGNOSIS — M25.562 LEFT KNEE PAIN, UNSPECIFIED CHRONICITY: Primary | ICD-10-CM

## 2023-05-01 ENCOUNTER — HOSPITAL ENCOUNTER (EMERGENCY)
Facility: OTHER | Age: 83
Discharge: HOME | End: 2023-05-01
Attending: STUDENT IN AN ORGANIZED HEALTH CARE EDUCATION/TRAINING PROGRAM | Admitting: STUDENT IN AN ORGANIZED HEALTH CARE EDUCATION/TRAINING PROGRAM
Payer: MEDICARE

## 2023-05-01 ENCOUNTER — APPOINTMENT (OUTPATIENT)
Dept: GENERAL RADIOLOGY | Facility: OTHER | Age: 83
End: 2023-05-01
Attending: STUDENT IN AN ORGANIZED HEALTH CARE EDUCATION/TRAINING PROGRAM
Payer: MEDICARE

## 2023-05-01 VITALS
SYSTOLIC BLOOD PRESSURE: 139 MMHG | DIASTOLIC BLOOD PRESSURE: 76 MMHG | OXYGEN SATURATION: 95 % | TEMPERATURE: 96.8 F | RESPIRATION RATE: 21 BRPM | HEART RATE: 70 BPM

## 2023-05-01 DIAGNOSIS — I49.1 PREMATURE ATRIAL COMPLEXES: ICD-10-CM

## 2023-05-01 DIAGNOSIS — R00.2 PALPITATIONS: ICD-10-CM

## 2023-05-01 LAB
ANION GAP SERPL CALCULATED.3IONS-SCNC: 7 MMOL/L (ref 7–15)
BASOPHILS # BLD AUTO: 0.1 10E3/UL (ref 0–0.2)
BASOPHILS NFR BLD AUTO: 2 %
BUN SERPL-MCNC: 16.1 MG/DL (ref 8–23)
CALCIUM SERPL-MCNC: 10.4 MG/DL (ref 8.8–10.2)
CHLORIDE SERPL-SCNC: 103 MMOL/L (ref 98–107)
CREAT SERPL-MCNC: 0.98 MG/DL (ref 0.51–0.95)
DEPRECATED HCO3 PLAS-SCNC: 29 MMOL/L (ref 22–29)
EOSINOPHIL # BLD AUTO: 0.1 10E3/UL (ref 0–0.7)
EOSINOPHIL NFR BLD AUTO: 3 %
ERYTHROCYTE [DISTWIDTH] IN BLOOD BY AUTOMATED COUNT: 13.7 % (ref 10–15)
GFR SERPL CREATININE-BSD FRML MDRD: 57 ML/MIN/1.73M2
GLUCOSE SERPL-MCNC: 90 MG/DL (ref 70–99)
HCT VFR BLD AUTO: 39.8 % (ref 35–47)
HGB BLD-MCNC: 13.2 G/DL (ref 11.7–15.7)
HOLD SPECIMEN: NORMAL
IMM GRANULOCYTES # BLD: 0 10E3/UL
IMM GRANULOCYTES NFR BLD: 0 %
LYMPHOCYTES # BLD AUTO: 0.9 10E3/UL (ref 0.8–5.3)
LYMPHOCYTES NFR BLD AUTO: 18 %
MAGNESIUM SERPL-MCNC: 2.4 MG/DL (ref 1.7–2.3)
MCH RBC QN AUTO: 32.5 PG (ref 26.5–33)
MCHC RBC AUTO-ENTMCNC: 33.2 G/DL (ref 31.5–36.5)
MCV RBC AUTO: 98 FL (ref 78–100)
MONOCYTES # BLD AUTO: 0.5 10E3/UL (ref 0–1.3)
MONOCYTES NFR BLD AUTO: 12 %
NEUTROPHILS # BLD AUTO: 3.1 10E3/UL (ref 1.6–8.3)
NEUTROPHILS NFR BLD AUTO: 65 %
NRBC # BLD AUTO: 0 10E3/UL
NRBC BLD AUTO-RTO: 0 /100
PLATELET # BLD AUTO: 250 10E3/UL (ref 150–450)
POTASSIUM SERPL-SCNC: 4.8 MMOL/L (ref 3.4–5.3)
RBC # BLD AUTO: 4.06 10E6/UL (ref 3.8–5.2)
SODIUM SERPL-SCNC: 139 MMOL/L (ref 136–145)
TROPONIN T SERPL HS-MCNC: 11 NG/L
TSH SERPL DL<=0.005 MIU/L-ACNC: 2.65 UIU/ML (ref 0.3–4.2)
WBC # BLD AUTO: 4.7 10E3/UL (ref 4–11)

## 2023-05-01 PROCEDURE — 85025 COMPLETE CBC W/AUTO DIFF WBC: CPT | Performed by: STUDENT IN AN ORGANIZED HEALTH CARE EDUCATION/TRAINING PROGRAM

## 2023-05-01 PROCEDURE — 82310 ASSAY OF CALCIUM: CPT | Performed by: STUDENT IN AN ORGANIZED HEALTH CARE EDUCATION/TRAINING PROGRAM

## 2023-05-01 PROCEDURE — 99285 EMERGENCY DEPT VISIT HI MDM: CPT | Mod: 25

## 2023-05-01 PROCEDURE — 83735 ASSAY OF MAGNESIUM: CPT | Performed by: STUDENT IN AN ORGANIZED HEALTH CARE EDUCATION/TRAINING PROGRAM

## 2023-05-01 PROCEDURE — 99284 EMERGENCY DEPT VISIT MOD MDM: CPT | Performed by: STUDENT IN AN ORGANIZED HEALTH CARE EDUCATION/TRAINING PROGRAM

## 2023-05-01 PROCEDURE — 36415 COLL VENOUS BLD VENIPUNCTURE: CPT | Performed by: STUDENT IN AN ORGANIZED HEALTH CARE EDUCATION/TRAINING PROGRAM

## 2023-05-01 PROCEDURE — 84443 ASSAY THYROID STIM HORMONE: CPT | Performed by: STUDENT IN AN ORGANIZED HEALTH CARE EDUCATION/TRAINING PROGRAM

## 2023-05-01 PROCEDURE — 93010 ELECTROCARDIOGRAM REPORT: CPT | Performed by: STUDENT IN AN ORGANIZED HEALTH CARE EDUCATION/TRAINING PROGRAM

## 2023-05-01 PROCEDURE — 71045 X-RAY EXAM CHEST 1 VIEW: CPT

## 2023-05-01 PROCEDURE — 84484 ASSAY OF TROPONIN QUANT: CPT | Performed by: STUDENT IN AN ORGANIZED HEALTH CARE EDUCATION/TRAINING PROGRAM

## 2023-05-01 PROCEDURE — 93005 ELECTROCARDIOGRAM TRACING: CPT

## 2023-05-01 ASSESSMENT — ENCOUNTER SYMPTOMS
ABDOMINAL PAIN: 0
APPETITE CHANGE: 0
FATIGUE: 1
ARTHRALGIAS: 0
DIAPHORESIS: 0
CHEST TIGHTNESS: 1
DIZZINESS: 1
RECTAL PAIN: 0
APNEA: 0
NERVOUS/ANXIOUS: 0
CHILLS: 0
DIARRHEA: 0
VOMITING: 0
CHOKING: 0
SHORTNESS OF BREATH: 1
WEAKNESS: 1
STRIDOR: 0
ACTIVITY CHANGE: 0
HEMATURIA: 0
NAUSEA: 1
BLOOD IN STOOL: 0
MYALGIAS: 0
HEADACHES: 0
CONSTIPATION: 0
FEVER: 0
WHEEZING: 0
PALPITATIONS: 1
COUGH: 0
LIGHT-HEADEDNESS: 1
DIFFICULTY URINATING: 0
DYSURIA: 0
BACK PAIN: 0
SEIZURES: 0

## 2023-05-01 ASSESSMENT — ACTIVITIES OF DAILY LIVING (ADL): ADLS_ACUITY_SCORE: 35

## 2023-05-01 NOTE — DISCHARGE INSTRUCTIONS
During this ER visit you were seen and evaluated for your history of symptomatic palpitations and irregular heartbeat.  We did identify a mildly irregular heartbeat on your electrocardiogram/heart tracing today.  I would recommend monitoring with a heart monitor known as a Zio patch for the next 3 to 7 days to determine how frequent these irregular heart beats are.  I also recommend that you follow-up with your primary care physician after monitoring with a Zio patch to consider possible medications versus other treatments should your monitoring reveal a high burden of these irregular heartbeats.  We did evaluate your heart markers, thyroid function as well as electrolytes and blood counts and these were all found to be within normal limits.  Please do not hesitate to return to the ER for further treatment and evaluation should you have any further episodes of palpitations, chest pain, chest heaviness, shortness of breath, dizziness or headache.    Ronny Rodas MD  PGY-3  May 1, 2023

## 2023-05-01 NOTE — ED TRIAGE NOTES
Patient presents to ER ambulatory with complaint of fatigue shortness of breath and irregular heart rate. Patient also reports chest tightness without pain.  Patient has history of RA and cancer. In remission.  BP (!) 160/90   Pulse 77   Temp 96.8  F (36  C) (Tympanic)   Resp 20   LMP  (LMP Unknown)   SpO2 94%      Triage Assessment     Row Name 05/01/23 0830       Triage Assessment (Adult)    Airway WDL WDL       Respiratory WDL    Respiratory WDL WDL       Skin Circulation/Temperature WDL    Skin Circulation/Temperature WDL WDL       Cardiac WDL    Cardiac WDL WDL       Peripheral/Neurovascular WDL    Peripheral Neurovascular WDL WDL       Cognitive/Neuro/Behavioral WDL    Cognitive/Neuro/Behavioral WDL WDL
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
## 2023-05-02 LAB
ATRIAL RATE - MUSE: 74 BPM
DIASTOLIC BLOOD PRESSURE - MUSE: NORMAL MMHG
INTERPRETATION ECG - MUSE: NORMAL
P AXIS - MUSE: 57 DEGREES
PR INTERVAL - MUSE: 162 MS
QRS DURATION - MUSE: 78 MS
QT - MUSE: 362 MS
QTC - MUSE: 401 MS
R AXIS - MUSE: 29 DEGREES
SYSTOLIC BLOOD PRESSURE - MUSE: NORMAL MMHG
T AXIS - MUSE: 52 DEGREES
VENTRICULAR RATE- MUSE: 74 BPM

## 2023-05-03 ENCOUNTER — HOSPITAL ENCOUNTER (OUTPATIENT)
Dept: GENERAL RADIOLOGY | Facility: OTHER | Age: 83
Discharge: HOME | End: 2023-05-03
Attending: ORTHOPAEDIC SURGERY
Payer: MEDICARE

## 2023-05-03 ENCOUNTER — OFFICE VISIT (OUTPATIENT)
Dept: ORTHOPEDICS | Facility: OTHER | Age: 83
End: 2023-05-03
Attending: FAMILY MEDICINE
Payer: MEDICARE

## 2023-05-03 VITALS — DIASTOLIC BLOOD PRESSURE: 72 MMHG | HEART RATE: 88 BPM | OXYGEN SATURATION: 93 % | SYSTOLIC BLOOD PRESSURE: 118 MMHG

## 2023-05-03 DIAGNOSIS — M25.562 LEFT KNEE PAIN, UNSPECIFIED CHRONICITY: ICD-10-CM

## 2023-05-03 DIAGNOSIS — G89.29 CHRONIC PAIN OF LEFT KNEE: Primary | ICD-10-CM

## 2023-05-03 DIAGNOSIS — M25.562 CHRONIC PAIN OF LEFT KNEE: Primary | ICD-10-CM

## 2023-05-03 DIAGNOSIS — M71.22 SYNOVIAL CYST OF LEFT POPLITEAL SPACE: ICD-10-CM

## 2023-05-03 PROCEDURE — 99203 OFFICE O/P NEW LOW 30 MIN: CPT | Performed by: ORTHOPAEDIC SURGERY

## 2023-05-03 PROCEDURE — 73560 X-RAY EXAM OF KNEE 1 OR 2: CPT | Mod: RT

## 2023-05-03 PROCEDURE — G0463 HOSPITAL OUTPT CLINIC VISIT: HCPCS

## 2023-05-03 ASSESSMENT — PAIN SCALES - GENERAL: PAINLEVEL: NO PAIN (1)

## 2023-05-03 NOTE — PROGRESS NOTES
Patient is here for consult on her left knee pain.  Ruthann Leon LPN .....................5/3/2023 8:04 AM

## 2023-05-03 NOTE — PROGRESS NOTES
Visit Date: 05/03/2023    REASON FOR EVALUATION:  Left knee pain.    HISTORY OF PRESENT ILLNESS:  Niya comes in with regards to the left knee.  She is reporting discomfort here for a bit of time.  It comes and goes.  It is actually feeling good here today.  She does have an underlying history of rheumatoid arthritis.  She has been treated for the last 20 years.  She is on methotrexate for that as well.  Her knee pain did settle down on its own here.  She has not undergone any particular injections here.  We did update her x-rays on her knees.  It does show evidence for moderate arthrosis, medial compartment of both knees plus patellofemoral joint.  Appears to have a combination of osteo and rheumatoid arthritis.  She has had prior arthroscopic surgery on the right knee by Dr. Jacinto in 2016.  Her symptoms have been going on for about a month.  Worse with activity.  A little better with rest.    PHYSICAL EXAMINATION:  The patient is alert and oriented x 3, cooperative with exam, in no acute distress.  Does ambulate with some mild gait antalgia.  Affect is appropriate.  Examination of both knee shows varus deformity bilaterally.  Tenderness across both medial joint lines, crepitation with flexion and extension is noted there as well in addition to that.  Range of motion is -5 to about 120 here at this point in time.  The exam is stable and balanced.  Kneecap tracking is acceptable, but with significant crepitation.  Quad strength 5/5.  Dorsalis pedis pulse, otherwise 2+.    IMAGING:  X-rays reviewed, 3 views of the left knee does show moderate arthrosis, medial compartment as well as patellofemoral joint at this time.    IMPRESSION:  Bilateral knee osteoarthrosis with rheumatoid arthritis component in varus deformity.    PLAN:  At this time, we are going to hold tight on any treatment.  She is doing well today.  If her pain does ramp up, we are going to initially try injections and again if that fails, we will  certainly discuss in the plan to move forward on joint replacement at some point, but at this point, she is doing well.  Does not want to embark on the bench of replacement here at this point in time.  All questions answered.    Rolando Moreno MD        D: 2023   T: 2023   MT: [id number]    Name:     MALA GROSSMAN  MRN:      [id number]        Account:    [id number]   :      1940           Visit Date: 2023     Document: [id number]

## 2023-06-22 ENCOUNTER — OFFICE VISIT (OUTPATIENT)
Dept: CARDIOLOGY | Facility: OTHER | Age: 83
End: 2023-06-22
Attending: NURSE PRACTITIONER
Payer: COMMERCIAL

## 2023-06-22 VITALS
SYSTOLIC BLOOD PRESSURE: 124 MMHG | DIASTOLIC BLOOD PRESSURE: 88 MMHG | HEIGHT: 65 IN | HEART RATE: 74 BPM | WEIGHT: 159 LBS | BODY MASS INDEX: 26.49 KG/M2 | TEMPERATURE: 98.3 F | OXYGEN SATURATION: 96 % | RESPIRATION RATE: 14 BRPM

## 2023-06-22 DIAGNOSIS — T46.6X5A MYALGIA DUE TO STATIN: ICD-10-CM

## 2023-06-22 DIAGNOSIS — R06.09 DOE (DYSPNEA ON EXERTION): ICD-10-CM

## 2023-06-22 DIAGNOSIS — I49.1 PREMATURE ATRIAL COMPLEXES: ICD-10-CM

## 2023-06-22 DIAGNOSIS — Z86.73 HISTORY OF STROKE: ICD-10-CM

## 2023-06-22 DIAGNOSIS — M79.10 MYALGIA DUE TO STATIN: ICD-10-CM

## 2023-06-22 DIAGNOSIS — E78.00 PURE HYPERCHOLESTEROLEMIA: ICD-10-CM

## 2023-06-22 DIAGNOSIS — I47.10 PAROXYSMAL SUPRAVENTRICULAR TACHYCARDIA (H): ICD-10-CM

## 2023-06-22 DIAGNOSIS — R53.83 FATIGUE, UNSPECIFIED TYPE: ICD-10-CM

## 2023-06-22 DIAGNOSIS — R07.89 CHEST TIGHTNESS: Primary | ICD-10-CM

## 2023-06-22 PROCEDURE — 99215 OFFICE O/P EST HI 40 MIN: CPT | Performed by: NURSE PRACTITIONER

## 2023-06-22 PROCEDURE — G0463 HOSPITAL OUTPT CLINIC VISIT: HCPCS | Mod: 25

## 2023-06-22 PROCEDURE — 93005 ELECTROCARDIOGRAM TRACING: CPT | Performed by: NURSE PRACTITIONER

## 2023-06-22 PROCEDURE — 93010 ELECTROCARDIOGRAM REPORT: CPT | Performed by: INTERNAL MEDICINE

## 2023-06-22 RX ORDER — ROSUVASTATIN CALCIUM 5 MG/1
2.5 TABLET, COATED ORAL EVERY OTHER DAY
Qty: 45 TABLET | Refills: 1 | Status: SHIPPED | OUTPATIENT
Start: 2023-06-22 | End: 2024-06-27

## 2023-06-22 ASSESSMENT — PAIN SCALES - GENERAL: PAINLEVEL: NO PAIN (0)
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
## 2023-06-22 NOTE — NURSING NOTE
"Chief Complaint   Patient presents with     Consult For     Cardiology. SOB-Irregular HR and Fatigue. Last seen in Cardiology 9/21/2021        Initial /88 (BP Location: Right arm, Patient Position: Sitting, Cuff Size: Adult Regular)   Pulse 74   Temp 98.3  F (36.8  C) (Tympanic)   Resp 14   Ht 1.651 m (5' 5\")   Wt 72.1 kg (159 lb)   LMP  (LMP Unknown)   SpO2 96%   BMI 26.46 kg/m   Estimated body mass index is 26.46 kg/m  as calculated from the following:    Height as of this encounter: 1.651 m (5' 5\").    Weight as of this encounter: 72.1 kg (159 lb).  Meds Reconciled: complete  Pt is on Aspirin  Pt is not on a Statin  PHQ and/or DIANA reviewed. Pt referred to PCP/MH Provider as appropriate.    Charity Galvez LPN    "

## 2023-06-22 NOTE — PATIENT INSTRUCTIONS
You will receive a phone call to schedule stress test.   Start Crestor 2.5 mg (half tab), take every other day. Please let me know if you develop muscle aches from this medication.

## 2023-06-23 LAB
ATRIAL RATE - MUSE: 70 BPM
DIASTOLIC BLOOD PRESSURE - MUSE: NORMAL MMHG
INTERPRETATION ECG - MUSE: NORMAL
P AXIS - MUSE: 65 DEGREES
PR INTERVAL - MUSE: 174 MS
QRS DURATION - MUSE: 78 MS
QT - MUSE: 360 MS
QTC - MUSE: 388 MS
R AXIS - MUSE: 47 DEGREES
SYSTOLIC BLOOD PRESSURE - MUSE: NORMAL MMHG
T AXIS - MUSE: 59 DEGREES
VENTRICULAR RATE- MUSE: 70 BPM

## 2023-07-17 ENCOUNTER — TELEPHONE (OUTPATIENT)
Dept: CARDIOLOGY | Facility: OTHER | Age: 83
End: 2023-07-17
Payer: COMMERCIAL

## 2023-07-17 NOTE — TELEPHONE ENCOUNTER
Patient verified .  Reminder call for stress test with instructions given.  Emphasis on NO caffeine.  Ok to have juice or water in am.  Ok to take medications as usual.  No food for 4 hours before.  To wear short sleeve loose fitting shirt.  Test will take 3 hours.  Patient verbalized understanding.

## 2023-07-18 ENCOUNTER — LAB (OUTPATIENT)
Dept: LAB | Facility: OTHER | Age: 83
End: 2023-07-18
Attending: NURSE PRACTITIONER
Payer: MEDICARE

## 2023-07-18 DIAGNOSIS — Z79.899 ENCOUNTER FOR LONG-TERM (CURRENT) USE OF HIGH-RISK MEDICATION: ICD-10-CM

## 2023-07-18 DIAGNOSIS — Z17.0 MALIGNANT NEOPLASM OF CENTRAL PORTION OF RIGHT BREAST IN FEMALE, ESTROGEN RECEPTOR POSITIVE (H): ICD-10-CM

## 2023-07-18 DIAGNOSIS — D05.11 DUCTAL CARCINOMA IN SITU (DCIS) OF RIGHT BREAST: ICD-10-CM

## 2023-07-18 DIAGNOSIS — C50.111 MALIGNANT NEOPLASM OF CENTRAL PORTION OF RIGHT BREAST IN FEMALE, ESTROGEN RECEPTOR POSITIVE (H): ICD-10-CM

## 2023-07-18 DIAGNOSIS — M05.79 SEROPOSITIVE RHEUMATOID ARTHRITIS OF MULTIPLE SITES (H): Primary | ICD-10-CM

## 2023-07-18 LAB
ALBUMIN SERPL BCG-MCNC: 4.2 G/DL (ref 3.5–5.2)
ALP SERPL-CCNC: 77 U/L (ref 35–104)
ALT SERPL W P-5'-P-CCNC: 23 U/L (ref 0–50)
ANION GAP SERPL CALCULATED.3IONS-SCNC: 11 MMOL/L (ref 7–15)
AST SERPL W P-5'-P-CCNC: 28 U/L (ref 0–45)
BASOPHILS # BLD AUTO: 0.1 10E3/UL (ref 0–0.2)
BASOPHILS NFR BLD AUTO: 2 %
BILIRUB SERPL-MCNC: 0.4 MG/DL
BUN SERPL-MCNC: 17.9 MG/DL (ref 8–23)
CALCIUM SERPL-MCNC: 10 MG/DL (ref 8.8–10.2)
CANCER AG15-3 SERPL-ACNC: 11 U/ML
CHLORIDE SERPL-SCNC: 104 MMOL/L (ref 98–107)
CREAT SERPL-MCNC: 1.01 MG/DL (ref 0.51–0.95)
DEPRECATED HCO3 PLAS-SCNC: 23 MMOL/L (ref 22–29)
EOSINOPHIL # BLD AUTO: 0.1 10E3/UL (ref 0–0.7)
EOSINOPHIL NFR BLD AUTO: 3 %
ERYTHROCYTE [DISTWIDTH] IN BLOOD BY AUTOMATED COUNT: 13.6 % (ref 10–15)
GFR SERPL CREATININE-BSD FRML MDRD: 55 ML/MIN/1.73M2
GLUCOSE SERPL-MCNC: 134 MG/DL (ref 70–99)
HCT VFR BLD AUTO: 39.1 % (ref 35–47)
HGB BLD-MCNC: 13 G/DL (ref 11.7–15.7)
IMM GRANULOCYTES # BLD: 0 10E3/UL
IMM GRANULOCYTES NFR BLD: 0 %
LDH SERPL L TO P-CCNC: 180 U/L (ref 0–250)
LYMPHOCYTES # BLD AUTO: 0.9 10E3/UL (ref 0.8–5.3)
LYMPHOCYTES NFR BLD AUTO: 22 %
MCH RBC QN AUTO: 32.3 PG (ref 26.5–33)
MCHC RBC AUTO-ENTMCNC: 33.2 G/DL (ref 31.5–36.5)
MCV RBC AUTO: 97 FL (ref 78–100)
MONOCYTES # BLD AUTO: 0.4 10E3/UL (ref 0–1.3)
MONOCYTES NFR BLD AUTO: 10 %
NEUTROPHILS # BLD AUTO: 2.7 10E3/UL (ref 1.6–8.3)
NEUTROPHILS NFR BLD AUTO: 63 %
NRBC # BLD AUTO: 0 10E3/UL
NRBC BLD AUTO-RTO: 0 /100
PLATELET # BLD AUTO: 225 10E3/UL (ref 150–450)
POTASSIUM SERPL-SCNC: 4.3 MMOL/L (ref 3.4–5.3)
PROT SERPL-MCNC: 7 G/DL (ref 6.4–8.3)
RBC # BLD AUTO: 4.03 10E6/UL (ref 3.8–5.2)
SODIUM SERPL-SCNC: 138 MMOL/L (ref 136–145)
WBC # BLD AUTO: 4.2 10E3/UL (ref 4–11)

## 2023-07-18 PROCEDURE — 36415 COLL VENOUS BLD VENIPUNCTURE: CPT | Mod: ZL

## 2023-07-18 PROCEDURE — 83615 LACTATE (LD) (LDH) ENZYME: CPT | Mod: ZL

## 2023-07-18 PROCEDURE — 85025 COMPLETE CBC W/AUTO DIFF WBC: CPT | Mod: ZL

## 2023-07-18 PROCEDURE — 86300 IMMUNOASSAY TUMOR CA 15-3: CPT | Mod: ZL

## 2023-07-18 PROCEDURE — 80053 COMPREHEN METABOLIC PANEL: CPT | Mod: ZL

## 2023-07-19 ENCOUNTER — HOSPITAL ENCOUNTER (OUTPATIENT)
Dept: NUCLEAR MEDICINE | Facility: OTHER | Age: 83
Discharge: HOME | End: 2023-07-19
Attending: NURSE PRACTITIONER
Payer: MEDICARE

## 2023-07-19 VITALS — WEIGHT: 159 LBS | HEIGHT: 65 IN | BODY MASS INDEX: 26.49 KG/M2

## 2023-07-19 DIAGNOSIS — R53.83 FATIGUE, UNSPECIFIED TYPE: ICD-10-CM

## 2023-07-19 DIAGNOSIS — R07.89 CHEST TIGHTNESS: ICD-10-CM

## 2023-07-19 DIAGNOSIS — R06.09 DOE (DYSPNEA ON EXERTION): ICD-10-CM

## 2023-07-19 LAB
CV BLOOD PRESSURE: 85 MMHG
CV STRESS MAX HR HE: 106
NUC STRESS EJECTION FRACTION: 71 %
RATE PRESSURE PRODUCT: NORMAL
STRESS ECHO BASELINE DIASTOLIC HE: 81
STRESS ECHO BASELINE HR: 69 BPM
STRESS ECHO BASELINE SYSTOLIC BP: 162
STRESS ECHO CALCULATED PERCENT HR: 77 %
STRESS ECHO LAST STRESS DIASTOLIC BP: 74
STRESS ECHO LAST STRESS SYSTOLIC BP: 138
STRESS ECHO POST ESTIMATED WORKLOAD: 1 METS
STRESS ECHO TARGET HR: 137

## 2023-07-19 PROCEDURE — 93017 CV STRESS TEST TRACING ONLY: CPT

## 2023-07-19 PROCEDURE — 343N000001 HC RX 343: Performed by: NURSE PRACTITIONER

## 2023-07-19 PROCEDURE — A9500 TC99M SESTAMIBI: HCPCS | Performed by: NURSE PRACTITIONER

## 2023-07-19 PROCEDURE — 93018 CV STRESS TEST I&R ONLY: CPT | Performed by: INTERNAL MEDICINE

## 2023-07-19 PROCEDURE — 250N000011 HC RX IP 250 OP 636: Mod: JZ | Performed by: NURSE PRACTITIONER

## 2023-07-19 PROCEDURE — G1010 CDSM STANSON: HCPCS

## 2023-07-19 PROCEDURE — 93016 CV STRESS TEST SUPVJ ONLY: CPT | Performed by: INTERNAL MEDICINE

## 2023-07-19 RX ORDER — REGADENOSON 0.08 MG/ML
0.4 INJECTION, SOLUTION INTRAVENOUS ONCE
Status: COMPLETED | OUTPATIENT
Start: 2023-07-19 | End: 2023-07-19

## 2023-07-19 RX ADMIN — REGADENOSON 0.4 MG: 0.08 INJECTION, SOLUTION INTRAVENOUS at 08:55

## 2023-07-19 RX ADMIN — KIT FOR THE PREPARATION OF TECHNETIUM TC99M SESTAMIBI 31.1 MILLICURIE: 1 INJECTION, POWDER, LYOPHILIZED, FOR SOLUTION PARENTERAL at 09:00

## 2023-07-19 RX ADMIN — KIT FOR THE PREPARATION OF TECHNETIUM TC99M SESTAMIBI 8.21 MILLICURIE: 1 INJECTION, POWDER, LYOPHILIZED, FOR SOLUTION PARENTERAL at 07:00

## 2023-07-19 NOTE — PROGRESS NOTES
0645The patient arrived for a Lexiscan Cardiolite stress test.  The procedure, risks, and benefits were discussed with the patient ,and the consent was signed.  A saline lock was started,and the Cardiolite was injected by Nuclear Medicine.  The patient was taken to the waiting area, to await resting images at 0700.  0830The patient returned from Nuclear Medicine and was prepped for the stress test.   Hillary Quinn arrived, and the patient was administered the Lexiscan per procedure.  The patient tolerated the procedure.  She was given a snack and taken to Nuclear Medicine in stable condition for stress images.  The saline lock will be removed by Nuclear Medicine for proper disposal.  The patient was instructed that the ordering MD will call with results in one to two days.  Please see the chart for the complete test results.

## 2023-07-25 ENCOUNTER — ONCOLOGY VISIT (OUTPATIENT)
Dept: ONCOLOGY | Facility: OTHER | Age: 83
End: 2023-07-25
Attending: NURSE PRACTITIONER
Payer: MEDICARE

## 2023-07-25 ENCOUNTER — LAB (OUTPATIENT)
Dept: LAB | Facility: OTHER | Age: 83
End: 2023-07-25
Attending: NURSE PRACTITIONER
Payer: COMMERCIAL

## 2023-07-25 VITALS
DIASTOLIC BLOOD PRESSURE: 82 MMHG | OXYGEN SATURATION: 96 % | BODY MASS INDEX: 25.13 KG/M2 | RESPIRATION RATE: 16 BRPM | WEIGHT: 151 LBS | HEART RATE: 82 BPM | SYSTOLIC BLOOD PRESSURE: 136 MMHG | TEMPERATURE: 96.9 F

## 2023-07-25 DIAGNOSIS — D05.11 DUCTAL CARCINOMA IN SITU (DCIS) OF RIGHT BREAST: Primary | ICD-10-CM

## 2023-07-25 DIAGNOSIS — M05.79 SEROPOSITIVE RHEUMATOID ARTHRITIS OF MULTIPLE SITES (H): ICD-10-CM

## 2023-07-25 DIAGNOSIS — Z79.899 ENCOUNTER FOR LONG-TERM (CURRENT) USE OF HIGH-RISK MEDICATION: ICD-10-CM

## 2023-07-25 DIAGNOSIS — R91.8 PULMONARY NODULES: ICD-10-CM

## 2023-07-25 DIAGNOSIS — C50.111 MALIGNANT NEOPLASM OF CENTRAL PORTION OF RIGHT BREAST IN FEMALE, ESTROGEN RECEPTOR POSITIVE (H): ICD-10-CM

## 2023-07-25 DIAGNOSIS — Z17.0 MALIGNANT NEOPLASM OF CENTRAL PORTION OF RIGHT BREAST IN FEMALE, ESTROGEN RECEPTOR POSITIVE (H): ICD-10-CM

## 2023-07-25 DIAGNOSIS — R06.02 SHORTNESS OF BREATH: ICD-10-CM

## 2023-07-25 LAB
ALBUMIN SERPL BCG-MCNC: 4.3 G/DL (ref 3.5–5.2)
ALP SERPL-CCNC: 79 U/L (ref 35–104)
ALT SERPL W P-5'-P-CCNC: 24 U/L (ref 0–50)
ANION GAP SERPL CALCULATED.3IONS-SCNC: 9 MMOL/L (ref 7–15)
AST SERPL W P-5'-P-CCNC: 30 U/L (ref 0–45)
BASOPHILS # BLD AUTO: 0.1 10E3/UL (ref 0–0.2)
BASOPHILS NFR BLD AUTO: 2 %
BILIRUB SERPL-MCNC: 0.4 MG/DL
BUN SERPL-MCNC: 16.1 MG/DL (ref 8–23)
CALCIUM SERPL-MCNC: 10 MG/DL (ref 8.8–10.2)
CHLORIDE SERPL-SCNC: 104 MMOL/L (ref 98–107)
CREAT SERPL-MCNC: 1.03 MG/DL (ref 0.51–0.95)
CRP SERPL-MCNC: <3 MG/L
DEPRECATED HCO3 PLAS-SCNC: 27 MMOL/L (ref 22–29)
EOSINOPHIL # BLD AUTO: 0.2 10E3/UL (ref 0–0.7)
EOSINOPHIL NFR BLD AUTO: 4 %
ERYTHROCYTE [DISTWIDTH] IN BLOOD BY AUTOMATED COUNT: 13.7 % (ref 10–15)
ERYTHROCYTE [SEDIMENTATION RATE] IN BLOOD BY WESTERGREN METHOD: 13 MM/HR (ref 0–30)
GFR SERPL CREATININE-BSD FRML MDRD: 54 ML/MIN/1.73M2
GLUCOSE SERPL-MCNC: 73 MG/DL (ref 70–99)
HCT VFR BLD AUTO: 38.8 % (ref 35–47)
HGB BLD-MCNC: 12.6 G/DL (ref 11.7–15.7)
IMM GRANULOCYTES # BLD: 0 10E3/UL
IMM GRANULOCYTES NFR BLD: 0 %
LYMPHOCYTES # BLD AUTO: 1 10E3/UL (ref 0.8–5.3)
LYMPHOCYTES NFR BLD AUTO: 21 %
MCH RBC QN AUTO: 32.1 PG (ref 26.5–33)
MCHC RBC AUTO-ENTMCNC: 32.5 G/DL (ref 31.5–36.5)
MCV RBC AUTO: 99 FL (ref 78–100)
MONOCYTES # BLD AUTO: 0.5 10E3/UL (ref 0–1.3)
MONOCYTES NFR BLD AUTO: 9 %
NEUTROPHILS # BLD AUTO: 3.1 10E3/UL (ref 1.6–8.3)
NEUTROPHILS NFR BLD AUTO: 64 %
NRBC # BLD AUTO: 0 10E3/UL
NRBC BLD AUTO-RTO: 0 /100
PLATELET # BLD AUTO: 243 10E3/UL (ref 150–450)
POTASSIUM SERPL-SCNC: 4.3 MMOL/L (ref 3.4–5.3)
PROT SERPL-MCNC: 7.1 G/DL (ref 6.4–8.3)
RBC # BLD AUTO: 3.92 10E6/UL (ref 3.8–5.2)
SODIUM SERPL-SCNC: 140 MMOL/L (ref 136–145)
WBC # BLD AUTO: 4.8 10E3/UL (ref 4–11)

## 2023-07-25 PROCEDURE — 99214 OFFICE O/P EST MOD 30 MIN: CPT | Performed by: NURSE PRACTITIONER

## 2023-07-25 PROCEDURE — 36415 COLL VENOUS BLD VENIPUNCTURE: CPT | Mod: ZL

## 2023-07-25 PROCEDURE — 85025 COMPLETE CBC W/AUTO DIFF WBC: CPT | Mod: ZL

## 2023-07-25 PROCEDURE — 86140 C-REACTIVE PROTEIN: CPT | Mod: ZL

## 2023-07-25 PROCEDURE — 80053 COMPREHEN METABOLIC PANEL: CPT | Mod: ZL

## 2023-07-25 PROCEDURE — G0463 HOSPITAL OUTPT CLINIC VISIT: HCPCS | Performed by: NURSE PRACTITIONER

## 2023-07-25 PROCEDURE — 85652 RBC SED RATE AUTOMATED: CPT | Mod: ZL

## 2023-07-25 ASSESSMENT — PAIN SCALES - GENERAL: PAINLEVEL: MILD PAIN (2)

## 2023-07-25 NOTE — PROGRESS NOTES
Oncology Follow-up Visit:  2023  Diagnosis:Breast cancer    History Of Present Illness:  Patient presents for followup of DCIS.  Patient was seen in consultation at the request of Dr. Cifuentes and Hillary Quinn, nurse practitioner, on 2022 to evaluate concerning diagnosis of DCIS of the right breast.  Apparently, she had undergone routine mammography and was found to have an abnormal mammogram with microcalcification in the right breast.  The patient underwent stereotactic-guided biopsy and was found to have DCIS, grade 2, strongly ER positive, OK positive.  HER-2/tommy was not tested.  She was ultimately seen by Dr. Betina Cifuentes who proceeded with right breast lumpectomy performed on 2022 and the findings were that the patient had DCIS, nuclear grade 2, cribriform type.  Size was 1.9 cm.  Margins were negative.  Estrogen receptor was positive at % and was negative for invasive malignancy.  In terms of risk factors of breast cancer, she had 2 sisters with breast cancer, 1  of metastatic breast cancer in her 40s.  She also had an aunt with breast cancer.  Other risk factors included that she had 3 children.  The first child was born at age 28 and the last 2 at age 33 and she  them all.  She also underwent menopause at age 55.  She was on at least 5 years of hormone replacement therapy due to hot flashes.  She was a nonsmoker, nondrinker.  When we saw the patient, we recommended adjuvant radiation therapy.  The patient refused. It was felt given her ER positivity that she would be a candidate for aromatase inhibitor therapy, which has shown to reduce development of invasive breast cancer in the ipsilateral breast as well as contralateral breast.  PET scan was done on 2022 and the findings were there were small pulmonary nodules up to 4 mm.  There was no previous CT to compare this.  They were not hypermetabolic.  It was recommended she repeat CT chest in 6 months.  The rest of  the PET scan was essentially negative for hypermetabolism.  She also had a bone density scan on 06/08/2022.  It was read as osteopenia with the lowest T-score being -2.3 in the right femoral neck. FRAX score for major osteoporotic fracture was 22.7%.  FRAX score for excoriated fracture was 8.5%.  When patient was seen on 06/15/2020, she had read up on aromatase inhibitor, Arimidex, and did not want to deal with the side effects, did not want to have hot flashes or osteoporosis. We elected to repeat the CT chest to assess pulmonary nodules and this was done on 12/19 and essentially the  lung nodules were stable and were all less than 4 mm and felt to be benign.  The patient is a nonsmoker, so therefore, these are likely benign nodules as she had never smoked in her life or had been exposed to secondhand smoke. Patient continued to refuse aromatase inhibitor. Mammogram was done on 3/14/23 and this was negative for malignancy. Labs done on 7/18/23 shows a normal tumor marker. Al other labs are stable. Patient reports she has had shortness of breath and chest tightness. She has also had leg pain. Patient was seen by cardiology and had a stress test performed. Stress test was normal. Patient continues to have shortness of breath, worse with exertion and chest tightness that comes and goes. Patient denies breast changes. She is otherwise feeling well and has no other new concerns      Review Of Systems:  Review Of Systems  Eyes: denies new vision changes  Respiratory: reports ongoing shortness of breath, occasional dry cough  Cardiovascular:reports chest tightness that comes and goes  Gastrointestinal: reports occasional constipation, denies abdominal pain  Genitourinary: denies dysuria or hematuria  Musculoskeletal: report arthritis pain in hands, knees  Neurologic: reports chronic headaches due to past neck injury   Hematologic/Lymphatic/Immunologic: denies fevers, no recent illness      There are no exam notes on file  for this visit.    Past medical, social, surgical, and family histories reviewed.    Allergies:  Allergies as of 07/25/2023 - Reviewed 07/19/2023   Allergen Reaction Noted    Codeine Nausea 11/13/2012    Excedrin back & [acetaminophen-aspirin buffered] Other (See Comments) 03/12/2021    Amoxicillin-pot clavulanate  03/20/2020    Atorvastatin Muscle Pain (Myalgia) 04/20/2021    Crestor [rosuvastatin]  03/08/2022    Simvastatin  03/08/2022    Sulfa antibiotics  10/28/2019    Tramadol Other (See Comments) 03/30/2022       Current Medications:  Current Outpatient Medications   Medication Sig Dispense Refill    acetaminophen (TYLENOL) 325 MG tablet Take 325 mg by mouth every 6 hours as needed for mild pain UNKNOWN DOSE      ascorbic acid (VITAMIN C) 1000 MG TABS Take 1,000 mg by mouth daily  30 tablet     aspirin (ASA) 81 MG EC tablet Take 1 tablet (81 mg) by mouth daily      CALCIUM CARBONATE-VIT D-MIN PO Take 1 capsule by mouth 2 times daily       cycloSPORINE (RESTASIS) 0.05 % ophthalmic emulsion Place 1 drop into both eyes 2 times daily       Flaxseed Oil OIL Take 1 capsule by mouth daily       folic acid (FOLVITE) 1 MG tablet Take 2 mg by mouth daily       Ginger, Zingiber officinalis, (GINGER EXTRACT) 250 MG CAPS Take 1 capsule by mouth daily 120 capsule     Lactobacillus (PROBIOTIC ACIDOPHILUS PO) Take 1 capsule by mouth as needed       Magnesium 300 MG CAPS Take 1 capsule by mouth daily      methotrexate 50 MG/2ML injection Inject 15 mg Subcutaneous every 7 days Takes on Mondays      metroNIDAZOLE (METROGEL) 0.75 % external gel Apply topically daily 45 g 3    Multiple Vitamin (MULTIVITAMIN ADULT PO) Take 1 tablet by mouth daily      Omega-3 Fatty Acids (SALMON OIL-1000 PO) Take 1 capsule by mouth 2 times daily       rosuvastatin (CRESTOR) 5 MG tablet Take 0.5 tablets (2.5 mg) by mouth every other day 45 tablet 1    triamcinolone (ARISTOCORT HP) 0.5 % external cream Apply sparingly to affected area two times  daily. 30 g 3    Turmeric Curcumin 500 MG CAPS Take 500 mg by mouth daily       vitamin E (TOCOPHEROL) 400 units (180 mg) capsule Take 400 Units by mouth daily      VOLTAREN 1 % GEL topical gel Apply to joints as needed.          Physical Exam:  LMP  (LMP Unknown)     GENERAL APPEARANCE: 83 year old female, alert and no distress     NECK: no adenopathy, no asymmetry or masses     LYMPHATICS: No cervical, supraclavicular, axillary lymphadenopathy     RESP: lungs clear to auscultation - no rales, rhonchi or wheezes     CARDIOVASCULAR: regular rates and rhythm, normal S1 S2     ABDOMEN:  soft, nontender, bowel sounds normal     MUSCULOSKELETAL: extremities normal- no gross deformities noted, trace edema b/l LE.     SKIN: no suspicious lesions or rashes on exposed skin     PSYCHIATRIC: mentation appears normal and affect normal    Laboratory/Imaging Studies  Lab on 07/18/2023   Component Date Value Ref Range Status    Cancer Antigen 15-3 07/18/2023 11  <=25 U/mL Final    This result is obtained using the Roche Elecsys CA 15-3 II method on the jorge [id number] immunoassay analyzer. Results obtained with different assay methods or kits cannot be used interchangeably.    Lactate Dehydrogenase 07/18/2023 180  0 - 250 U/L Final    Sodium 07/18/2023 138  136 - 145 mmol/L Final    Potassium 07/18/2023 4.3  3.4 - 5.3 mmol/L Final    Chloride 07/18/2023 104  98 - 107 mmol/L Final    Carbon Dioxide (CO2) 07/18/2023 23  22 - 29 mmol/L Final    Anion Gap 07/18/2023 11  7 - 15 mmol/L Final    Urea Nitrogen 07/18/2023 17.9  8.0 - 23.0 mg/dL Final    Creatinine 07/18/2023 1.01 (H)  0.51 - 0.95 mg/dL Final    Calcium 07/18/2023 10.0  8.8 - 10.2 mg/dL Final    Glucose 07/18/2023 134 (H)  70 - 99 mg/dL Final    Alkaline Phosphatase 07/18/2023 77  35 - 104 U/L Final    AST 07/18/2023 28  0 - 45 U/L Final    Reference intervals for this test were updated on 6/12/2023 to more accurately reflect our healthy population. There may be differences in  the flagging of prior results with similar values performed with this method. Interpretation of those prior results can be made in the context of the updated reference intervals.    ALT 07/18/2023 23  0 - 50 U/L Final    Reference intervals for this test were updated on 6/12/2023 to more accurately reflect our healthy population. There may be differences in the flagging of prior results with similar values performed with this method. Interpretation of those prior results can be made in the context of the updated reference intervals.      Protein Total 07/18/2023 7.0  6.4 - 8.3 g/dL Final    Albumin 07/18/2023 4.2  3.5 - 5.2 g/dL Final    Bilirubin Total 07/18/2023 0.4  <=1.2 mg/dL Final    GFR Estimate 07/18/2023 55 (L)  >60 mL/min/1.73m2 Final    WBC Count 07/18/2023 4.2  4.0 - 11.0 10e3/uL Final    RBC Count 07/18/2023 4.03  3.80 - 5.20 10e6/uL Final    Hemoglobin 07/18/2023 13.0  11.7 - 15.7 g/dL Final    Hematocrit 07/18/2023 39.1  35.0 - 47.0 % Final    MCV 07/18/2023 97  78 - 100 fL Final    MCH 07/18/2023 32.3  26.5 - 33.0 pg Final    MCHC 07/18/2023 33.2  31.5 - 36.5 g/dL Final    RDW 07/18/2023 13.6  10.0 - 15.0 % Final    Platelet Count 07/18/2023 225  150 - 450 10e3/uL Final    % Neutrophils 07/18/2023 63  % Final    % Lymphocytes 07/18/2023 22  % Final    % Monocytes 07/18/2023 10  % Final    % Eosinophils 07/18/2023 3  % Final    % Basophils 07/18/2023 2  % Final    % Immature Granulocytes 07/18/2023 0  % Final    NRBCs per 100 WBC 07/18/2023 0  <1 /100 Final    Absolute Neutrophils 07/18/2023 2.7  1.6 - 8.3 10e3/uL Final    Absolute Lymphocytes 07/18/2023 0.9  0.8 - 5.3 10e3/uL Final    Absolute Monocytes 07/18/2023 0.4  0.0 - 1.3 10e3/uL Final    Absolute Eosinophils 07/18/2023 0.1  0.0 - 0.7 10e3/uL Final    Absolute Basophils 07/18/2023 0.1  0.0 - 0.2 10e3/uL Final    Absolute Immature Granulocytes 07/18/2023 0.0  <=0.4 10e3/uL Final    Absolute NRBCs 07/18/2023 0.0  10e3/uL Final         ASSESSMENT/PLAN:  1. DCIS. Stage 0 malignant neoplasm of the central portion of the right breast, consistent with grade 2 DCIS 1.9 cm mass, status post lumpectomy.  The patient refused adjuvant radiation therapy.  PET scan was essentially negative except for multiple nonspecific pulmonary nodules that were not hypermetabolic.  We offered the patient aromatase inhibitor; she refused.  Bone density scan did show osteopenia.  We recommended vitamin D and calcium.  She refused aromatase inhibitor therapy. Patient is aware of the risks of not taking aromatase inhibitor. Mammogram was done on 3/14/23 and this was negative for malignancy. Labs done on 7/18/23 shows a normal tumor marker. Al other labs are stable. Will see patient in 6 months with repeat labs     2.  Pulmonary nodules.  Repeat CT chest was stable. It was felt that these are benign nodules. Patient does reports shortness of breath and chest tightness. Stress test was normal. Will have her set up for a CT chest and will call patient with results.      Thirty one minutes spent with this encounter with time spent reviewing patient records, counseling patient regarding disease process, interpretation and review of labs with patient, discussing recent stress test for shortness of breath and need for CT imaging, discussing plan for ongoing surveillance, obtaining a review of systems, performing a physical exam, ordering tests, documenting in EHR and coordination of care

## 2023-07-25 NOTE — NURSING NOTE
Chief Complaint   Patient presents with    Oncology Clinic Visit     Breast CA     Medication Reconciliation: complete    Cyndi Polo CMA ([facility]) 7/25/2023 9:52 AM

## 2023-08-02 ENCOUNTER — HOSPITAL ENCOUNTER (OUTPATIENT)
Dept: CT IMAGING | Facility: OTHER | Age: 83
Discharge: HOME | End: 2023-08-02
Attending: NURSE PRACTITIONER | Admitting: NURSE PRACTITIONER
Payer: MEDICARE

## 2023-08-02 DIAGNOSIS — R91.8 PULMONARY NODULES: ICD-10-CM

## 2023-08-02 DIAGNOSIS — R06.02 SHORTNESS OF BREATH: ICD-10-CM

## 2023-08-02 PROCEDURE — G1010 CDSM STANSON: HCPCS

## 2023-08-02 PROCEDURE — 250N000011 HC RX IP 250 OP 636: Performed by: NURSE PRACTITIONER

## 2023-08-02 RX ORDER — IOPAMIDOL 755 MG/ML
90 INJECTION, SOLUTION INTRAVASCULAR ONCE
Status: COMPLETED | OUTPATIENT
Start: 2023-08-02 | End: 2023-08-02

## 2023-08-02 RX ADMIN — IOPAMIDOL 90 ML: 755 INJECTION, SOLUTION INTRAVENOUS at 08:57

## 2023-08-02 NOTE — PROGRESS NOTES
IV Contrast- Discharge Instructions After Your CT Scan      The IV contrast you received today will be filtered from your bloodstream by your kidneys during the next 24 hours and pass from the body in urine.  You will not be aware of this process and your urine will not change in color.  To help this process you should drink at least 4 additional glasses of water or juice today.  This reduces stress on your kidneys.    Most contrast reactions are immediate.  Should you develop symptoms of concern after discharge, contact the department at the number below.  After hours you should contact your personal physician.  If you develop breathing distress or wheezing, call 911.    1.  Has the patient had a previous reaction to IV contrast? No    2.  Does the patient have kidney disease? Yes    3.  Is the patient on dialysis? No    If YES to any of these questions, exam will be reviewed with a Radiologist before administering contrast.

## 2023-08-09 ENCOUNTER — TRANSFERRED RECORDS (OUTPATIENT)
Dept: HEALTH INFORMATION MANAGEMENT | Facility: OTHER | Age: 83
End: 2023-08-09
Payer: COMMERCIAL

## 2023-09-11 ENCOUNTER — VIRTUAL VISIT (OUTPATIENT)
Dept: FAMILY MEDICINE | Facility: OTHER | Age: 83
End: 2023-09-11
Payer: COMMERCIAL

## 2023-09-11 DIAGNOSIS — U07.1 COVID-19: Primary | ICD-10-CM

## 2023-09-11 PROCEDURE — 99213 OFFICE O/P EST LOW 20 MIN: CPT | Mod: 95 | Performed by: STUDENT IN AN ORGANIZED HEALTH CARE EDUCATION/TRAINING PROGRAM

## 2023-09-11 PROCEDURE — G0463 HOSPITAL OUTPT CLINIC VISIT: HCPCS | Mod: TEL

## 2023-09-11 RX ORDER — HYDROXYCHLOROQUINE SULFATE 200 MG/1
200 TABLET, FILM COATED ORAL DAILY
COMMUNITY
Start: 2023-08-09 | End: 2024-06-27

## 2023-09-11 NOTE — PROGRESS NOTES
Verena is a 83 year old who is being evaluated via a billable telephone visit.      What phone number would you like to be contacted at? [phone]  How would you like to obtain your AVS? Joel    Distant Location (provider location):  on-site    Assessment & Plan     (U07.1) COVID-19  (primary encounter diagnosis)    Comment: Positive for COVID-19, symptoms started about 3 days ago.  Overall feeling well though continued to have persistent headache.  She has not felt like she has needed any over-the-counter management at this time.  History of slightly decreased renal function.  She does have comorbidities making her at increased risk for needing hospitalization.    Plan: nirmatrelvir and ritonavir (PAXLOVID) 150         mg/100 mg therapy pack          After discussing risks and benefits, plan to treat with Paxlovid.  Renal function dosing.  Discussed to hold her statin while on this medication and for 5 days afterwards.  Plan to use over-the-counter management as well for symptoms.  Discussed following up with PCP or utilizing rapid clinic/ER if symptoms worsen or change and she has further concerns.  She is comfortable and agreeable with this plan.      Zarina Carter PA-C  [facility]    Subjective   Verena is a 83 year old, presenting for the following health issues:  Covid Concern (Positive 9/11/23)      HPI     Early on allergy symptoms, now worse today, test positive. Headache, runny nose, watery eyes, no cough.     COVID-19 Symptom Review  How many days ago did these symptoms start? Friday, 9/8/23    Are any of the following symptoms significant for you?  New or worsening difficulty breathing? No  Worsening cough? No  Fever or chills? No  Headache: YES  Sore throat: No  Chest pain: No  Diarrhea: No  Body aches? No    What treatments has patient tried? None   Does patient live in a nursing home, group home, or shelter? No  Does patient have a way to get food/medications during  quarantined? Yes, I have a friend or family member who can help me.          Review of Systems   Constitutional, HEENT, cardiovascular, pulmonary, gi and gu systems are negative, except as otherwise noted.      Objective    Vitals - Patient Reported  Pain Score: Mild Pain (2)  Pain Loc: Head        Physical Exam   healthy, alert, and no distress  PSYCH: Alert and oriented times 3; coherent speech, normal   rate and volume, able to articulate logical thoughts, able   to abstract reason, no tangential thoughts, no hallucinations   or delusions  Her affect is normal  RESP: No cough, no audible wheezing, able to talk in full sentences  Remainder of exam unable to be completed due to telephone visits        Phone call duration: 12 minutes

## 2023-09-19 ENCOUNTER — HOSPITAL ENCOUNTER (OUTPATIENT)
Dept: MAMMOGRAPHY | Facility: OTHER | Age: 83
Discharge: HOME | End: 2023-09-19
Attending: NURSE PRACTITIONER | Admitting: NURSE PRACTITIONER
Payer: MEDICARE

## 2023-09-19 DIAGNOSIS — D05.11 DUCTAL CARCINOMA IN SITU (DCIS) OF RIGHT BREAST: ICD-10-CM

## 2023-09-19 DIAGNOSIS — C50.111 MALIGNANT NEOPLASM OF CENTRAL PORTION OF RIGHT BREAST IN FEMALE, ESTROGEN RECEPTOR POSITIVE (H): ICD-10-CM

## 2023-09-19 DIAGNOSIS — Z17.0 MALIGNANT NEOPLASM OF CENTRAL PORTION OF RIGHT BREAST IN FEMALE, ESTROGEN RECEPTOR POSITIVE (H): ICD-10-CM

## 2023-09-19 PROCEDURE — 77065 DX MAMMO INCL CAD UNI: CPT | Mod: RT

## 2023-09-19 PROCEDURE — 77061 BREAST TOMOSYNTHESIS UNI: CPT | Mod: RT

## 2023-09-25 ENCOUNTER — OFFICE VISIT (OUTPATIENT)
Dept: FAMILY MEDICINE | Facility: OTHER | Age: 83
End: 2023-09-25
Attending: NURSE PRACTITIONER
Payer: COMMERCIAL

## 2023-09-25 VITALS
OXYGEN SATURATION: 97 % | SYSTOLIC BLOOD PRESSURE: 116 MMHG | HEIGHT: 65 IN | DIASTOLIC BLOOD PRESSURE: 74 MMHG | TEMPERATURE: 97 F | WEIGHT: 159.2 LBS | HEART RATE: 84 BPM | RESPIRATION RATE: 16 BRPM | BODY MASS INDEX: 26.52 KG/M2

## 2023-09-25 DIAGNOSIS — N30.00 ACUTE CYSTITIS WITHOUT HEMATURIA: Primary | ICD-10-CM

## 2023-09-25 LAB
ALBUMIN UR-MCNC: NEGATIVE MG/DL
APPEARANCE UR: ABNORMAL
BACTERIA #/AREA URNS HPF: ABNORMAL /HPF
BILIRUB UR QL STRIP: NEGATIVE
COLOR UR AUTO: YELLOW
GLUCOSE UR STRIP-MCNC: NEGATIVE MG/DL
HGB UR QL STRIP: NEGATIVE
KETONES UR STRIP-MCNC: NEGATIVE MG/DL
LEUKOCYTE ESTERASE UR QL STRIP: NEGATIVE
MUCOUS THREADS #/AREA URNS LPF: PRESENT /LPF
NITRATE UR QL: NEGATIVE
PH UR STRIP: 8 [PH] (ref 5–9)
RBC URINE: 1 /HPF
SP GR UR STRIP: 1.01 (ref 1–1.03)
UROBILINOGEN UR STRIP-MCNC: NORMAL MG/DL
WBC URINE: 4 /HPF
YEAST #/AREA URNS HPF: ABNORMAL /HPF

## 2023-09-25 PROCEDURE — 81001 URINALYSIS AUTO W/SCOPE: CPT | Mod: ZL | Performed by: NURSE PRACTITIONER

## 2023-09-25 PROCEDURE — 87086 URINE CULTURE/COLONY COUNT: CPT | Mod: ZL | Performed by: NURSE PRACTITIONER

## 2023-09-25 PROCEDURE — G0463 HOSPITAL OUTPT CLINIC VISIT: HCPCS | Performed by: NURSE PRACTITIONER

## 2023-09-25 PROCEDURE — 99214 OFFICE O/P EST MOD 30 MIN: CPT | Performed by: NURSE PRACTITIONER

## 2023-09-25 RX ORDER — NITROFURANTOIN 25; 75 MG/1; MG/1
100 CAPSULE ORAL 2 TIMES DAILY
Qty: 14 CAPSULE | Refills: 0 | Status: SHIPPED | OUTPATIENT
Start: 2023-09-25 | End: 2023-10-02

## 2023-09-25 RX ORDER — FLUCONAZOLE 150 MG/1
150 TABLET ORAL ONCE
Qty: 1 TABLET | Refills: 0 | Status: SHIPPED | OUTPATIENT
Start: 2023-09-25 | End: 2023-09-25

## 2023-09-25 ASSESSMENT — PAIN SCALES - GENERAL: PAINLEVEL: NO PAIN (0)

## 2023-09-25 NOTE — COMMUNITY RESOURCES LIST (ENGLISH)
09/25/2023   [facility]  N/A  For questions about this resource list or additional care needs, please contact your primary care clinic or care manager.  Phone: [phone]   Email: N/A   Address: [address] [place] [id number]   Hours: N/A        Housing       Shelter for families  1  [facility] Distance: 18.91 miles      In-Person   [address] [address]  Language: English  Hours: Mon - Sun Open 24 Hours  Fees: Free   Phone: (892) 303-1751 Email: info@durchblicker.at.[employer]ociety Website: https://www.[employer]/     Shelter for individuals  2  [facility] Distance: 18.91 miles      In-Person   [address] [address]  Language: English  Hours: Mon - Sun Open 24 Hours  Fees: Free   Phone: (198) 284-3949 Email: info@[employer].at.[employer] Website: https://www.[employer].at.org/          Important Numbers & Websites       Emergency Services   911  [facility]   311  Poison Control   (525) 404-3944  Suicide Prevention Lifeline   (704) 800-1973 (TALK)  Child Abuse Hotline   (524) 246-9289 (4-A-Child)  Sexual Assault Hotline   (601) 640-4733 (HOPE)  National Runaway Safeline   (473) 155-1348 (RUNAWAY)  All-Options Talkline   (182) 415-4372  Substance Abuse Referral   (364) 194-1484 (HELP)

## 2023-09-25 NOTE — NURSING NOTE
Patient presents today for urinary frequency and urgency.    Medication Reconciliation Complete    Judith Moura LPN  9/25/2023 10:13 AM

## 2023-09-25 NOTE — PROGRESS NOTES
"  Assessment & Plan   Problem List Items Addressed This Visit    None  Visit Diagnoses       Acute cystitis without hematuria    -  Primary    Relevant Medications    nitroFURantoin macrocrystal-monohydrate (MACROBID) 100 MG capsule    fluconazole (DIFLUCAN) 150 MG tablet    Other Relevant Orders    Urine Culture Aerobic Bacterial    UTI (urinary tract infection)        Relevant Medications    nitroFURantoin macrocrystal-monohydrate (MACROBID) 100 MG capsule    fluconazole (DIFLUCAN) 150 MG tablet    Other Relevant Orders    UA Macroscopic with reflex to Microscopic and Culture (Completed)    Urine Culture Aerobic Bacterial           Urinalysis did show few bacteria, moderate yeast and mucus.  4+ white blood cells.  Given her symptoms, opted to treat empirically with Macrobid twice daily for 7 days.  Given these, treat with Diflucan x1.  Urine culture is pending.    Review of the result(s) of each unique test - UA  Ordering of each unique test  Prescription drug management         BMI:   Estimated body mass index is 26.49 kg/m  as calculated from the following:    Height as of this encounter: 1.651 m (5' 5\").    Weight as of this encounter: 72.2 kg (159 lb 3.2 oz).           No follow-ups on file.    [facility]    Subjective   Verena is a 83 year old, presenting for the following health issues:  Urinary Pain      History of Present Illness       Reason for visit:  Urinary pain  Symptom onset:  1-3 days ago  Symptoms include:  Frequncy and urgency  Symptom intensity:  Moderate  Symptom progression:  Worsening  Had these symptoms before:  No      She comes in today for concerns about possible bladder infection.  She has noted some groin and pelvic pain, burning with urination and urinary frequency.  Symptoms been present for the past few weeks but have progressively worsened.  She denies any fever, no hematuria.  No history of urinary tract infection.  She has not done " "anything for symptomatic management.  Does have occasional constipation but this has been well controlled with magnesium.      Review of Systems   See above      Objective    /74   Pulse 84   Temp 97  F (36.1  C)   Resp 16   Ht 1.651 m (5' 5\")   Wt 72.2 kg (159 lb 3.2 oz)   LMP  (LMP Unknown)   SpO2 97%   BMI 26.49 kg/m    Body mass index is 26.49 kg/m .  Physical Exam   GENERAL: healthy, alert and no distress  EYES: Eyes grossly normal to inspection,  RESP: lungs clear to auscultation - no rales, rhonchi or wheezes  CV: regular rate and rhythm, normal S1 S2  ABDOMEN: soft, suprapubic tenderness, no CVAT  PSYCH: mentation appears normal, affect normal/bright    Results for orders placed or performed in visit on 09/25/23 (from the past 24 hour(s))   UA Macroscopic with reflex to Microscopic and Culture    Specimen: Urine, Midstream   Result Value Ref Range    Color Urine Yellow Colorless, Straw, Light Yellow, Yellow    Appearance Urine Cloudy (A) Clear    Glucose Urine Negative Negative mg/dL    Bilirubin Urine Negative Negative    Ketones Urine Negative Negative mg/dL    Specific Gravity Urine 1.011 1.000 - 1.030    Blood Urine Negative Negative    pH Urine 8.0 5.0 - 9.0    Protein Albumin Urine Negative Negative mg/dL    Urobilinogen Urine Normal Normal, 2.0 mg/dL    Nitrite Urine Negative Negative    Leukocyte Esterase Urine Negative Negative    Bacteria Urine Few (A) None Seen /HPF    Budding Yeast Urine Moderate (A) None Seen /HPF    Mucus Urine Present (A) None Seen /LPF    RBC Urine 1 <=2 /HPF    WBC Urine 4 <=5 /HPF    Narrative    Urine Culture not indicated                     "

## 2023-09-27 LAB — BACTERIA UR CULT: NORMAL

## 2023-10-11 ENCOUNTER — OFFICE VISIT (OUTPATIENT)
Dept: FAMILY MEDICINE | Facility: OTHER | Age: 83
End: 2023-10-11
Attending: FAMILY MEDICINE
Payer: COMMERCIAL

## 2023-10-11 VITALS
HEIGHT: 65 IN | TEMPERATURE: 98.1 F | RESPIRATION RATE: 16 BRPM | HEART RATE: 80 BPM | SYSTOLIC BLOOD PRESSURE: 124 MMHG | BODY MASS INDEX: 26.73 KG/M2 | DIASTOLIC BLOOD PRESSURE: 82 MMHG | OXYGEN SATURATION: 96 % | WEIGHT: 160.4 LBS

## 2023-10-11 DIAGNOSIS — R10.2 PELVIC PAIN IN FEMALE: ICD-10-CM

## 2023-10-11 DIAGNOSIS — B37.49 YEAST UTI: ICD-10-CM

## 2023-10-11 DIAGNOSIS — N30.00 ACUTE CYSTITIS WITHOUT HEMATURIA: Primary | ICD-10-CM

## 2023-10-11 LAB
ALBUMIN UR-MCNC: NEGATIVE MG/DL
APPEARANCE UR: CLEAR
BILIRUB UR QL STRIP: NEGATIVE
COLOR UR AUTO: YELLOW
GLUCOSE UR STRIP-MCNC: NEGATIVE MG/DL
HGB UR QL STRIP: NEGATIVE
KETONES UR STRIP-MCNC: NEGATIVE MG/DL
LEUKOCYTE ESTERASE UR QL STRIP: NEGATIVE
NITRATE UR QL: NEGATIVE
PH UR STRIP: 6.5 [PH] (ref 5–9)
SP GR UR STRIP: 1.01 (ref 1–1.03)
UROBILINOGEN UR STRIP-MCNC: NORMAL MG/DL

## 2023-10-11 PROCEDURE — 99213 OFFICE O/P EST LOW 20 MIN: CPT | Performed by: FAMILY MEDICINE

## 2023-10-11 PROCEDURE — 81003 URINALYSIS AUTO W/O SCOPE: CPT | Mod: ZL | Performed by: FAMILY MEDICINE

## 2023-10-11 PROCEDURE — G0463 HOSPITAL OUTPT CLINIC VISIT: HCPCS

## 2023-10-11 RX ORDER — METHOTREXATE SODIUM 25 MG/ML
INJECTION, SOLUTION INTRA-ARTERIAL; INTRAMUSCULAR; INTRAVENOUS
COMMUNITY
Start: 2023-07-28 | End: 2024-07-25

## 2023-10-11 RX ORDER — FLUCONAZOLE 150 MG/1
TABLET ORAL
COMMUNITY
Start: 2023-09-25 | End: 2024-03-20

## 2023-10-11 ASSESSMENT — PAIN SCALES - GENERAL: PAINLEVEL: MILD PAIN (3)

## 2023-10-11 NOTE — PROGRESS NOTES
"  Assessment & Plan       ICD-10-CM    1. Acute cystitis without hematuria  N30.00 UA Macroscopic with reflex to Microscopic and Culture     US Pelvic Complete with Transvaginal     UA Macroscopic with reflex to Microscopic and Culture      2. Yeast UTI  B37.49       3. Pelvic pain in female  R10.2 US Pelvic Complete with Transvaginal         I have personally reviewed the labs listed below. With these being normal, discussed additional follow up.  Symptoms could be related to GYN pathology, consider GI/colon such as diverticular disease.  US is scheduled but low threshold to proceed with CT.  No new medications/antibiotics at this time.      Review of the result(s) of each unique test - yes   Ordering of each unique test         BMI:   Estimated body mass index is 26.69 kg/m  as calculated from the following:    Height as of this encounter: 1.651 m (5' 5\").    Weight as of this encounter: 72.8 kg (160 lb 6.4 oz).           No follow-ups on file.    BRITTNEE SHAY MD  [facility]    Jacklyn Albarado is a 83 year old, presenting for the following health issues:  Abdominal Pain (Possible UTI )      10/11/2023     3:01 PM   Additional Questions   Roomed by Davon LOVIGN LPN       History of Present Illness       Reason for visit:  Possible bladder infection   belly pain    She eats 4 or more servings of fruits and vegetables daily.She consumes 1 sweetened beverage(s) daily.She exercises with enough effort to increase her heart rate 10 to 19 minutes per day.  She exercises with enough effort to increase her heart rate 3 or less days per week.   She is taking medications regularly.     She has lower pelvic pain and last night had nocturia x6.  Typical would be up 1-2 times.  Was in 9/25 - UC was mixed growth and UA showed  yeast.  No fever.  States she doesn't eat much, but that has been her typical pattern.    She is not sure if her bladder empties all the way.    She has her uterus.  " "    Current Outpatient Medications   Medication    acetaminophen (TYLENOL) 325 MG tablet    ascorbic acid (VITAMIN C) 1000 MG TABS    aspirin (ASA) 81 MG EC tablet    CALCIUM CARBONATE-VIT D-MIN PO    cycloSPORINE (RESTASIS) 0.05 % ophthalmic emulsion    Flaxseed Oil OIL    fluconazole (DIFLUCAN) 150 MG tablet    folic acid (FOLVITE) 1 MG tablet    Rosalva, Zingiber officinalis, (ROSALVA EXTRACT) 250 MG CAPS    hydroxychloroquine (PLAQUENIL) 200 MG tablet    Lactobacillus (PROBIOTIC ACIDOPHILUS PO)    Magnesium 300 MG CAPS    methotrexate 250 MG/10ML SOLN injection    metroNIDAZOLE (METROGEL) 0.75 % external gel    Multiple Vitamin (MULTIVITAMIN ADULT PO)    Omega-3 Fatty Acids (SALMON OIL-1000 PO)    rosuvastatin (CRESTOR) 5 MG tablet    triamcinolone (ARISTOCORT HP) 0.5 % external cream    Turmeric Curcumin 500 MG CAPS    vitamin E (TOCOPHEROL) 400 units (180 mg) capsule    VOLTAREN 1 % GEL topical gel    methotrexate 50 MG/2ML injection     No current facility-administered medications for this visit.              Review of Systems         Objective    /82 (BP Location: Right arm, Patient Position: Sitting, Cuff Size: Adult Regular)   Pulse 80   Temp 98.1  F (36.7  C) (Temporal)   Resp 16   Ht 1.651 m (5' 5\")   Wt 72.8 kg (160 lb 6.4 oz)   LMP 10/11/2003 (Approximate)   SpO2 96%   Breastfeeding No   BMI 26.69 kg/m    Body mass index is 26.69 kg/m .  Physical Exam   GENERAL: healthy, alert and no distress  Abdomen - non-tender; no CVA tenderness  Results for orders placed or performed in visit on 10/11/23   UA Macroscopic with reflex to Microscopic and Culture     Status: Normal    Specimen: Urine, Midstream   Result Value Ref Range    Color Urine Yellow Colorless, Straw, Light Yellow, Yellow    Appearance Urine Clear Clear    Glucose Urine Negative Negative mg/dL    Bilirubin Urine Negative Negative    Ketones Urine Negative Negative mg/dL    Specific Gravity Urine 1.006 1.000 - 1.030    Blood Urine " Negative Negative    pH Urine 6.5 5.0 - 9.0    Protein Albumin Urine Negative Negative mg/dL    Urobilinogen Urine Normal Normal, 2.0 mg/dL    Nitrite Urine Negative Negative    Leukocyte Esterase Urine Negative Negative    Narrative    Microscopic not indicated

## 2023-10-11 NOTE — NURSING NOTE
"Chief Complaint   Patient presents with    Abdominal Pain     Possible UTI    Patient reports that she was seen/treated for a UTI 9/25/23, but is still having symptoms of frequent urination, abdominal pain    Initial /82 (BP Location: Right arm, Patient Position: Sitting, Cuff Size: Adult Regular)   Pulse 80   Temp 98.1  F (36.7  C) (Temporal)   Resp 16   Ht 1.651 m (5' 5\")   Wt 72.8 kg (160 lb 6.4 oz)   LMP 10/11/2003 (Approximate)   SpO2 96%   Breastfeeding No   BMI 26.69 kg/m   Estimated body mass index is 26.69 kg/m  as calculated from the following:    Height as of this encounter: 1.651 m (5' 5\").    Weight as of this encounter: 72.8 kg (160 lb 6.4 oz).  Medication Reconciliation: complete      Advance care plan reviewed      Gemini Lama LPN on 10/11/2023 at 3:06 PM      "

## 2023-10-11 NOTE — PROGRESS NOTES
Answers submitted by the patient for this visit:  General Questionnaire (Submitted on 10/11/2023)  Chief Complaint: Chronic problems general questions HPI Form  What is the reason for your visit today? : possible bladder infection   belly pain  How many servings of fruits and vegetables do you eat daily?: 4 or more  On average, how many sweetened beverages do you drink each day (Examples: soda, juice, sweet tea, etc.  Do NOT count diet or artificially sweetened beverages)?: 1  How many minutes a day do you exercise enough to make your heart beat faster?: 10 to 19  How many days a week do you exercise enough to make your heart beat faster?: 3 or less  How many days per week do you miss taking your medication?: 0

## 2023-10-24 ENCOUNTER — HOSPITAL ENCOUNTER (OUTPATIENT)
Dept: ULTRASOUND IMAGING | Facility: OTHER | Age: 83
Discharge: HOME | End: 2023-10-24
Attending: FAMILY MEDICINE
Payer: MEDICARE

## 2023-10-24 ENCOUNTER — LAB (OUTPATIENT)
Dept: LAB | Facility: OTHER | Age: 83
End: 2023-10-24
Attending: NURSE PRACTITIONER
Payer: MEDICARE

## 2023-10-24 DIAGNOSIS — R10.2 PELVIC PAIN IN FEMALE: ICD-10-CM

## 2023-10-24 DIAGNOSIS — Z79.899 ENCOUNTER FOR LONG-TERM (CURRENT) USE OF HIGH-RISK MEDICATION: ICD-10-CM

## 2023-10-24 DIAGNOSIS — N30.00 ACUTE CYSTITIS WITHOUT HEMATURIA: ICD-10-CM

## 2023-10-24 DIAGNOSIS — M05.79 SEROPOSITIVE RHEUMATOID ARTHRITIS OF MULTIPLE SITES (H): ICD-10-CM

## 2023-10-24 LAB
ALBUMIN SERPL BCG-MCNC: 4.3 G/DL (ref 3.5–5.2)
ALP SERPL-CCNC: 86 U/L (ref 35–104)
ALT SERPL W P-5'-P-CCNC: 18 U/L (ref 0–50)
ANION GAP SERPL CALCULATED.3IONS-SCNC: 8 MMOL/L (ref 7–15)
AST SERPL W P-5'-P-CCNC: 25 U/L (ref 0–45)
BASOPHILS # BLD AUTO: 0.1 10E3/UL (ref 0–0.2)
BASOPHILS NFR BLD AUTO: 2 %
BILIRUB SERPL-MCNC: 0.2 MG/DL
BUN SERPL-MCNC: 20.2 MG/DL (ref 8–23)
CALCIUM SERPL-MCNC: 10.1 MG/DL (ref 8.8–10.2)
CHLORIDE SERPL-SCNC: 105 MMOL/L (ref 98–107)
CREAT SERPL-MCNC: 1.08 MG/DL (ref 0.51–0.95)
CRP SERPL-MCNC: <3 MG/L
DEPRECATED HCO3 PLAS-SCNC: 27 MMOL/L (ref 22–29)
EGFRCR SERPLBLD CKD-EPI 2021: 51 ML/MIN/1.73M2
EOSINOPHIL # BLD AUTO: 0.2 10E3/UL (ref 0–0.7)
EOSINOPHIL NFR BLD AUTO: 3 %
ERYTHROCYTE [DISTWIDTH] IN BLOOD BY AUTOMATED COUNT: 13.8 % (ref 10–15)
ERYTHROCYTE [SEDIMENTATION RATE] IN BLOOD BY WESTERGREN METHOD: 12 MM/HR (ref 0–30)
GLUCOSE SERPL-MCNC: 101 MG/DL (ref 70–99)
HCT VFR BLD AUTO: 39 % (ref 35–47)
HGB BLD-MCNC: 13 G/DL (ref 11.7–15.7)
IMM GRANULOCYTES # BLD: 0 10E3/UL
IMM GRANULOCYTES NFR BLD: 0 %
LYMPHOCYTES # BLD AUTO: 1.2 10E3/UL (ref 0.8–5.3)
LYMPHOCYTES NFR BLD AUTO: 23 %
MCH RBC QN AUTO: 32.3 PG (ref 26.5–33)
MCHC RBC AUTO-ENTMCNC: 33.3 G/DL (ref 31.5–36.5)
MCV RBC AUTO: 97 FL (ref 78–100)
MONOCYTES # BLD AUTO: 0.6 10E3/UL (ref 0–1.3)
MONOCYTES NFR BLD AUTO: 11 %
NEUTROPHILS # BLD AUTO: 3.2 10E3/UL (ref 1.6–8.3)
NEUTROPHILS NFR BLD AUTO: 61 %
NRBC # BLD AUTO: 0 10E3/UL
NRBC BLD AUTO-RTO: 0 /100
PLATELET # BLD AUTO: 243 10E3/UL (ref 150–450)
POTASSIUM SERPL-SCNC: 4.3 MMOL/L (ref 3.4–5.3)
PROT SERPL-MCNC: 7.1 G/DL (ref 6.4–8.3)
RBC # BLD AUTO: 4.03 10E6/UL (ref 3.8–5.2)
SODIUM SERPL-SCNC: 140 MMOL/L (ref 135–145)
WBC # BLD AUTO: 5.3 10E3/UL (ref 4–11)

## 2023-10-24 PROCEDURE — 80053 COMPREHEN METABOLIC PANEL: CPT | Mod: ZL

## 2023-10-24 PROCEDURE — 86140 C-REACTIVE PROTEIN: CPT | Mod: ZL

## 2023-10-24 PROCEDURE — 85025 COMPLETE CBC W/AUTO DIFF WBC: CPT | Mod: ZL

## 2023-10-24 PROCEDURE — 36415 COLL VENOUS BLD VENIPUNCTURE: CPT | Mod: ZL

## 2023-10-24 PROCEDURE — 76830 TRANSVAGINAL US NON-OB: CPT

## 2023-10-24 PROCEDURE — 85652 RBC SED RATE AUTOMATED: CPT | Mod: ZL

## 2023-12-06 ENCOUNTER — TRANSFERRED RECORDS (OUTPATIENT)
Dept: HEALTH INFORMATION MANAGEMENT | Facility: OTHER | Age: 83
End: 2023-12-06
Payer: COMMERCIAL

## 2023-12-06 LAB — EJECTION FRACTION: 55 %

## 2024-01-16 ENCOUNTER — LAB (OUTPATIENT)
Dept: LAB | Facility: OTHER | Age: 84
End: 2024-01-16
Attending: NURSE PRACTITIONER
Payer: MEDICARE

## 2024-01-16 DIAGNOSIS — C50.111 MALIGNANT NEOPLASM OF CENTRAL PORTION OF RIGHT BREAST IN FEMALE, ESTROGEN RECEPTOR POSITIVE (H): ICD-10-CM

## 2024-01-16 DIAGNOSIS — Z79.899 ENCOUNTER FOR LONG-TERM (CURRENT) USE OF HIGH-RISK MEDICATION: ICD-10-CM

## 2024-01-16 DIAGNOSIS — M05.79 SEROPOSITIVE RHEUMATOID ARTHRITIS OF MULTIPLE SITES (H): ICD-10-CM

## 2024-01-16 DIAGNOSIS — Z17.0 MALIGNANT NEOPLASM OF CENTRAL PORTION OF RIGHT BREAST IN FEMALE, ESTROGEN RECEPTOR POSITIVE (H): ICD-10-CM

## 2024-01-16 DIAGNOSIS — D05.11 DUCTAL CARCINOMA IN SITU (DCIS) OF RIGHT BREAST: ICD-10-CM

## 2024-01-16 LAB
ALBUMIN SERPL BCG-MCNC: 4.2 G/DL (ref 3.5–5.2)
ALP SERPL-CCNC: 77 U/L (ref 40–150)
ALT SERPL W P-5'-P-CCNC: 19 U/L (ref 0–50)
ANION GAP SERPL CALCULATED.3IONS-SCNC: 8 MMOL/L (ref 7–15)
AST SERPL W P-5'-P-CCNC: 25 U/L (ref 0–45)
BASOPHILS # BLD AUTO: 0.1 10E3/UL (ref 0–0.2)
BASOPHILS NFR BLD AUTO: 2 %
BILIRUB SERPL-MCNC: 0.4 MG/DL
BUN SERPL-MCNC: 18 MG/DL (ref 8–23)
CALCIUM SERPL-MCNC: 10 MG/DL (ref 8.8–10.2)
CHLORIDE SERPL-SCNC: 102 MMOL/L (ref 98–107)
CREAT SERPL-MCNC: 1.09 MG/DL (ref 0.51–0.95)
CRP SERPL-MCNC: <3 MG/L
DEPRECATED HCO3 PLAS-SCNC: 28 MMOL/L (ref 22–29)
EGFRCR SERPLBLD CKD-EPI 2021: 50 ML/MIN/1.73M2
EOSINOPHIL # BLD AUTO: 0.2 10E3/UL (ref 0–0.7)
EOSINOPHIL NFR BLD AUTO: 3 %
ERYTHROCYTE [DISTWIDTH] IN BLOOD BY AUTOMATED COUNT: 13.7 % (ref 10–15)
ERYTHROCYTE [SEDIMENTATION RATE] IN BLOOD BY WESTERGREN METHOD: 7 MM/HR (ref 0–30)
GLUCOSE SERPL-MCNC: 89 MG/DL (ref 70–99)
HCT VFR BLD AUTO: 39.5 % (ref 35–47)
HGB BLD-MCNC: 13.3 G/DL (ref 11.7–15.7)
IMM GRANULOCYTES # BLD: 0 10E3/UL
IMM GRANULOCYTES NFR BLD: 0 %
LDH SERPL L TO P-CCNC: 189 U/L (ref 0–250)
LYMPHOCYTES # BLD AUTO: 0.9 10E3/UL (ref 0.8–5.3)
LYMPHOCYTES NFR BLD AUTO: 19 %
MCH RBC QN AUTO: 32.3 PG (ref 26.5–33)
MCHC RBC AUTO-ENTMCNC: 33.7 G/DL (ref 31.5–36.5)
MCV RBC AUTO: 96 FL (ref 78–100)
MONOCYTES # BLD AUTO: 0.6 10E3/UL (ref 0–1.3)
MONOCYTES NFR BLD AUTO: 13 %
NEUTROPHILS # BLD AUTO: 2.8 10E3/UL (ref 1.6–8.3)
NEUTROPHILS NFR BLD AUTO: 63 %
NRBC # BLD AUTO: 0 10E3/UL
NRBC BLD AUTO-RTO: 0 /100
PLATELET # BLD AUTO: 224 10E3/UL (ref 150–450)
POTASSIUM SERPL-SCNC: 4.6 MMOL/L (ref 3.4–5.3)
PROT SERPL-MCNC: 6.9 G/DL (ref 6.4–8.3)
RBC # BLD AUTO: 4.12 10E6/UL (ref 3.8–5.2)
SODIUM SERPL-SCNC: 138 MMOL/L (ref 135–145)
WBC # BLD AUTO: 4.4 10E3/UL (ref 4–11)

## 2024-01-16 PROCEDURE — 36415 COLL VENOUS BLD VENIPUNCTURE: CPT | Mod: ZL

## 2024-01-16 PROCEDURE — 80053 COMPREHEN METABOLIC PANEL: CPT | Mod: ZL

## 2024-01-16 PROCEDURE — 86140 C-REACTIVE PROTEIN: CPT | Mod: ZL

## 2024-01-16 PROCEDURE — 85652 RBC SED RATE AUTOMATED: CPT | Mod: ZL

## 2024-01-16 PROCEDURE — 85025 COMPLETE CBC W/AUTO DIFF WBC: CPT | Mod: ZL

## 2024-01-16 PROCEDURE — 86300 IMMUNOASSAY TUMOR CA 15-3: CPT | Mod: ZL

## 2024-01-16 PROCEDURE — 83615 LACTATE (LD) (LDH) ENZYME: CPT | Mod: ZL

## 2024-01-17 ENCOUNTER — OFFICE VISIT (OUTPATIENT)
Dept: INTERNAL MEDICINE | Facility: OTHER | Age: 84
End: 2024-01-17
Attending: NURSE PRACTITIONER
Payer: MEDICARE

## 2024-01-17 VITALS
HEIGHT: 65 IN | SYSTOLIC BLOOD PRESSURE: 110 MMHG | OXYGEN SATURATION: 94 % | HEART RATE: 88 BPM | BODY MASS INDEX: 26.56 KG/M2 | RESPIRATION RATE: 16 BRPM | DIASTOLIC BLOOD PRESSURE: 70 MMHG | WEIGHT: 159.4 LBS | TEMPERATURE: 98.3 F

## 2024-01-17 DIAGNOSIS — M05.79 SEROPOSITIVE RHEUMATOID ARTHRITIS OF MULTIPLE SITES (H): Primary | ICD-10-CM

## 2024-01-17 DIAGNOSIS — L30.9 DERMATITIS: ICD-10-CM

## 2024-01-17 DIAGNOSIS — H61.91 LESION OF SKIN OF RIGHT EAR: ICD-10-CM

## 2024-01-17 PROCEDURE — G0463 HOSPITAL OUTPT CLINIC VISIT: HCPCS

## 2024-01-17 PROCEDURE — 17003 DESTRUCT PREMALG LES 2-14: CPT | Performed by: NURSE PRACTITIONER

## 2024-01-17 PROCEDURE — 99212 OFFICE O/P EST SF 10 MIN: CPT | Mod: 25 | Performed by: NURSE PRACTITIONER

## 2024-01-17 PROCEDURE — 17000 DESTRUCT PREMALG LESION: CPT | Performed by: NURSE PRACTITIONER

## 2024-01-17 RX ORDER — TRIAMCINOLONE ACETONIDE 5 MG/G
CREAM TOPICAL
Qty: 30 G | Refills: 3 | Status: SHIPPED | OUTPATIENT
Start: 2024-01-17

## 2024-01-17 ASSESSMENT — PAIN SCALES - GENERAL: PAINLEVEL: NO PAIN (0)

## 2024-01-17 NOTE — PROGRESS NOTES
ICD-10-CM    1. Seropositive rheumatoid arthritis of multiple sites (H)  M05.79 diclofenac (VOLTAREN) 1 % topical gel      2. Dermatitis  L30.9 triamcinolone (ARISTOCORT HP) 0.5 % external cream      3. Lesion of skin of right ear  H61.91          Plan:  Refill completed of Voltaren gel and triamcinolone cream.  Follow-up in 4 weeks for reevaluation of skin lesion right ear and repeat cryotherapy if needed.  Hygienic measures discussed.    Jacklyn Albarado is a 83 year old, presenting for the following health issues:  Ear Problem      1/17/2024    12:40 PM   Additional Questions   Roomed by Chayito CASTILLO                    ROS:  See hpi    Objective    LMP 10/11/2003 (Approximate)   There is no height or weight on file to calculate BMI.    Physical Exam   Pleasant female no acute distress.  Along the pinna of the right ear there are 2 dry superficial gray skin lesions.  After permission granted by patient cryotherapy used for treatment x 3.  Well-tolerated.            Signed Electronically by: Kaia Quinn NP

## 2024-01-17 NOTE — NURSING NOTE
"Chief Complaint   Patient presents with    Ear Problem       FOOD SECURITY SCREENING QUESTIONS  Hunger Vital Signs:  Within the past 12 months we worried whether our food would run out before we got money to buy more. Never  Within the past 12 months the food we bought just didn't last and we didn't have money to get more. Never  Chayito Azul LPN 1/17/2024 12:43 PM      Initial /70 (BP Location: Right arm, Patient Position: Sitting, Cuff Size: Adult Regular)   Pulse 88   Temp 98.3  F (36.8  C) (Tympanic)   Resp 16   Ht 1.651 m (5' 5\")   Wt 72.3 kg (159 lb 6.4 oz)   LMP 10/11/2003 (Approximate)   SpO2 94%   BMI 26.53 kg/m   Estimated body mass index is 26.53 kg/m  as calculated from the following:    Height as of this encounter: 1.651 m (5' 5\").    Weight as of this encounter: 72.3 kg (159 lb 6.4 oz).  Medication Reconciliation: complete    Chayito Azul LPN    "

## 2024-01-18 LAB — CANCER AG15-3 SERPL-ACNC: 11 U/ML

## 2024-01-23 ENCOUNTER — ONCOLOGY VISIT (OUTPATIENT)
Dept: ONCOLOGY | Facility: OTHER | Age: 84
End: 2024-01-23
Attending: NURSE PRACTITIONER
Payer: COMMERCIAL

## 2024-01-23 VITALS
BODY MASS INDEX: 26.46 KG/M2 | OXYGEN SATURATION: 97 % | TEMPERATURE: 98.2 F | WEIGHT: 159 LBS | RESPIRATION RATE: 16 BRPM | DIASTOLIC BLOOD PRESSURE: 72 MMHG | SYSTOLIC BLOOD PRESSURE: 110 MMHG | HEART RATE: 84 BPM

## 2024-01-23 DIAGNOSIS — R91.8 PULMONARY NODULES: ICD-10-CM

## 2024-01-23 DIAGNOSIS — Z17.0 MALIGNANT NEOPLASM OF CENTRAL PORTION OF RIGHT BREAST IN FEMALE, ESTROGEN RECEPTOR POSITIVE (H): Primary | ICD-10-CM

## 2024-01-23 DIAGNOSIS — D05.11 DUCTAL CARCINOMA IN SITU (DCIS) OF RIGHT BREAST: ICD-10-CM

## 2024-01-23 DIAGNOSIS — C50.111 MALIGNANT NEOPLASM OF CENTRAL PORTION OF RIGHT BREAST IN FEMALE, ESTROGEN RECEPTOR POSITIVE (H): Primary | ICD-10-CM

## 2024-01-23 PROCEDURE — G0463 HOSPITAL OUTPT CLINIC VISIT: HCPCS

## 2024-01-23 PROCEDURE — 99214 OFFICE O/P EST MOD 30 MIN: CPT | Performed by: NURSE PRACTITIONER

## 2024-01-23 ASSESSMENT — PAIN SCALES - GENERAL: PAINLEVEL: NO PAIN (0)

## 2024-01-23 NOTE — PATIENT INSTRUCTIONS
Follow up with Kimberly Mullins NP  in 6 months. Please come prior for lab work.    Mammogram due in March 2024, order placed

## 2024-01-23 NOTE — NURSING NOTE
"Oncology Rooming Note    January 23, 2024 9:39 AM   Verena Wise is a 83 year old female who presents for:    Chief Complaint   Patient presents with    Oncology Clinic Visit     [facility] cancer     Initial Vitals: /72 (BP Location: Right arm, Patient Position: Sitting, Cuff Size: Adult Regular)   Pulse 84   Temp 98.2  F (36.8  C) (Tympanic)   Resp 16   Wt 72.1 kg (159 lb)   LMP 10/11/2003 (Approximate)   SpO2 97%   BMI 26.46 kg/m   Estimated body mass index is 26.46 kg/m  as calculated from the following:    Height as of 1/17/24: 1.651 m (5' 5\").    Weight as of this encounter: 72.1 kg (159 lb). Body surface area is 1.82 meters squared.  No Pain (0) Comment: Data Unavailable   Patient's last menstrual period was 10/11/2003 (approximate).  Allergies reviewed: Yes  Medications reviewed: Yes    Medications: Medication refills not needed today.  Pharmacy name entered into [employer]: [facility] - [address]    Frailty Screening:   Is the patient here for a new oncology consult visit in cancer care? 2. No      Clinical concerns: no       Mine Bhagat LPN              "

## 2024-01-23 NOTE — PROGRESS NOTES
Oncology Follow-up Visit:  January 23, 2024  Diagnosis:DCIS    History Of Present Illness:  Patient presents for followup of DCIS.  Patient was seen in consultation at the request of Dr. Cifuentes and Hillary Quinn, nurse practitioner, on 05/11/2022 to evaluate concerning diagnosis of DCIS of the right breast.  Apparently, she had undergone routine mammography and was found to have an abnormal mammogram with microcalcification in the right breast.  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  HER-2/tommy was not tested.  She was ultimately seen by Dr. Betina Cifuentes who proceeded with right breast lumpectomy performed on 03/30/2022 and the findings were that the patient had DCIS, nuclear grade 2, cribriform type.  Size was 1.9 cm.  Margins were negative.  Estrogen receptor was positive at % and was negative for invasive malignancy. When we saw the patient, we recommended adjuvant radiation therapy.  The patient refused. It was felt given her ER positivity that she would be a candidate for aromatase inhibitor therapy, which has shown to reduce development of invasive breast cancer in the ipsilateral breast as well as contralateral breast.  PET scan was done on 06/08/2022 and the findings were there were small pulmonary nodules up to 4 mm. There was no previous CT to compare this.  They were not hypermetabolic.  It was recommended she repeat CT chest in 6 months. The rest of the PET scan was essentially negative for hypermetabolism.  She also had a bone density scan on 06/08/2022.  It was read as osteopenia with the lowest T-score being -2.3 in the right femoral neck. FRAX score for major osteoporotic fracture was 22.7%.  FRAX score for excoriated fracture was 8.5%.  When patient was seen on 06/15/2020, she had read up on aromatase inhibitor, Arimidex, and did not want to deal with the side effects, did not want to have hot flashes or  osteoporosis. We elected to repeat the CT chest to assess pulmonary nodules and this was done on 12/19 and essentially the  lung nodules were stable and were all less than 4 mm and felt to be benign.  The patient is a nonsmoker, so therefore, these are likely benign nodules as she had never smoked in her life or had been exposed to secondhand smoke. Patient continued to refuse aromatase inhibitor. CT chest from 8/2/23 was stable. Patient has occasional shortness of breath with activity. She was seen by cardiology and is now seeing pulmonology. Right mammogram from 9/19/23 was negative for malignancy. Labs done on 1/16/24 show a normal tumor marker. All other labs are stable. Patient denies any new breast changes. She has no new concerns at this time.    Review Of Systems:  Review Of Systems  Eyes/Ears/Nose/Throat: denies nasal congestion or sinus pain  Respiratory: reports occasional shortness of breath with exertion, seeing pulmonology  Cardiovascular: denies chest pain  Gastrointestinal: reports occasional constipation, denies abdominal pain  Genitourinary: denies dysuria or hematuria  Musculoskeletal: denies new bone pain  Neurologic: reports occasional headaches, stable  Hematologic/Lymphatic/Immunologic: denies fevers, no recent illness      Nursing Notes:   Mine Bhagat LPN  1/23/2024  9:41 AM  Signed      Mine Bhagat LPN  1/23/2024  9:43 AM  Signed  Oncology Rooming Note    January 23, 2024 9:39 AM   Verena Wise is a 83 year old female who presents for:    Chief Complaint   Patient presents with    Oncology Clinic Visit     Breat cancer     Initial Vitals: /72 (BP Location: Right arm, Patient Position: Sitting, Cuff Size: Adult Regular)   Pulse 84   Temp 98.2  F (36.8  C) (Tympanic)   Resp 16   Wt 72.1 kg (159 lb)   LMP 10/11/2003 (Approximate)   SpO2 97%   BMI 26.46 kg/m   Estimated body mass index is 26.46 kg/m  as calculated from the following:    Height as of 1/17/24:  "1.651 m (5' 5\").    Weight as of this encounter: 72.1 kg (159 lb). Body surface area is 1.82 meters squared.  No Pain (0) Comment: Data Unavailable   Patient's last menstrual period was 10/11/2003 (approximate).  Allergies reviewed: Yes  Medications reviewed: Yes    Medications: Medication refills not needed today.  Pharmacy name entered into [employer]: [facility] [address]    Frailty Screening:   Is the patient here for a new oncology consult visit in cancer care? 2. No      Clinical concerns: no       Minemarika Bhagat, LPN      Past medical, social, surgical, and family histories reviewed.    Allergies:  Allergies as of 01/23/2024 - Reviewed 01/23/2024   Allergen Reaction Noted    Codeine Nausea 11/13/2012    Excedrin back & [acetaminophen-aspirin buffered] Other (See Comments) 03/12/2021    Amoxicillin-pot clavulanate  03/20/2020    Atorvastatin Muscle Pain (Myalgia) 04/20/2021    Crestor [rosuvastatin]  03/08/2022    Simvastatin  03/08/2022    Sulfa antibiotics  10/28/2019    Tramadol Other (See Comments) 03/30/2022       Current Medications:  Current Outpatient Medications   Medication Sig Dispense Refill    acetaminophen (TYLENOL) 325 MG tablet Take 325 mg by mouth every 6 hours as needed for mild pain UNKNOWN DOSE      ascorbic acid (VITAMIN C) 1000 MG TABS Take 1,000 mg by mouth daily  30 tablet     aspirin (ASA) 81 MG EC tablet Take 1 tablet (81 mg) by mouth daily      CALCIUM CARBONATE-VIT D-MIN PO Take 1 capsule by mouth 2 times daily       cycloSPORINE (RESTASIS) 0.05 % ophthalmic emulsion Place 1 drop into both eyes 2 times daily       diclofenac (VOLTAREN) 1 % topical gel Apply 2 g topically 4 times daily Apply to joints as needed. 50 g 3    Flaxseed Oil OIL Take 1 capsule by mouth daily       fluconazole (DIFLUCAN) 150 MG tablet TAKE 1 TABLET BY MOUTH FOR A ONE TIME DOSE.      folic acid (FOLVITE) 1 MG tablet Take 2 mg by mouth daily       Ginger, Zingiber " officinalis, (GINGER EXTRACT) 250 MG CAPS Take 1 capsule by mouth daily 120 capsule     hydroxychloroquine (PLAQUENIL) 200 MG tablet Take 200 mg by mouth daily      Lactobacillus (PROBIOTIC ACIDOPHILUS PO) Take 1 capsule by mouth as needed       Magnesium 300 MG CAPS Take 1 capsule by mouth daily      methotrexate 250 MG/10ML SOLN injection       methotrexate 50 MG/2ML injection Inject 15 mg Subcutaneous every 7 days Takes on Mondays      metroNIDAZOLE (METROGEL) 0.75 % external gel Apply topically daily 45 g 3    Multiple Vitamin (MULTIVITAMIN ADULT PO) Take 1 tablet by mouth daily      Omega-3 Fatty Acids (SALMON OIL-1000 PO) Take 1 capsule by mouth 2 times daily       rosuvastatin (CRESTOR) 5 MG tablet Take 0.5 tablets (2.5 mg) by mouth every other day 45 tablet 1    triamcinolone (ARISTOCORT HP) 0.5 % external cream Apply sparingly to affected area two times daily. 30 g 3    Turmeric Curcumin 500 MG CAPS Take 500 mg by mouth daily       vitamin E (TOCOPHEROL) 400 units (180 mg) capsule Take 400 Units by mouth daily          Physical Exam:  /72 (BP Location: Right arm, Patient Position: Sitting, Cuff Size: Adult Regular)   Pulse 84   Temp 98.2  F (36.8  C) (Tympanic)   Resp 16   Wt 72.1 kg (159 lb)   LMP 10/11/2003 (Approximate)   SpO2 97%   BMI 26.46 kg/m      GENERAL APPEARANCE: 83 year old female, alert and no distress     NECK: no adenopathy, no asymmetry or masses     LYMPHATICS: No cervical, supraclavicular, axillary lymphadenopathy     RESP: lungs clear to auscultation - no rales, rhonchi or wheezes     CARDIOVASCULAR: regular rates and rhythm, normal S1 S2     ABDOMEN:  soft, nontender, bowel sounds normal     MUSCULOSKELETAL: extremities normal- no gross deformities noted, trace edema b/l LE.     SKIN: no suspicious lesions or rashes on exposed skin     PSYCHIATRIC: mentation appears normal and affect normal    Laboratory/Imaging Studies  Lab on 01/16/2024   Component Date Value Ref Range  Status    Sodium 01/16/2024 138  135 - 145 mmol/L Final    Reference intervals for this test were updated on 09/26/2023 to more accurately reflect our healthy population. There may be differences in the flagging of prior results with similar values performed with this method. Interpretation of those prior results can be made in the context of the updated reference intervals.     Potassium 01/16/2024 4.6  3.4 - 5.3 mmol/L Final    Carbon Dioxide (CO2) 01/16/2024 28  22 - 29 mmol/L Final    Anion Gap 01/16/2024 8  7 - 15 mmol/L Final    Urea Nitrogen 01/16/2024 18.0  8.0 - 23.0 mg/dL Final    Creatinine 01/16/2024 1.09 (H)  0.51 - 0.95 mg/dL Final    GFR Estimate 01/16/2024 50 (L)  >60 mL/min/1.73m2 Final    Calcium 01/16/2024 10.0  8.8 - 10.2 mg/dL Final    Chloride 01/16/2024 102  98 - 107 mmol/L Final    Glucose 01/16/2024 89  70 - 99 mg/dL Final    Alkaline Phosphatase 01/16/2024 77  40 - 150 U/L Final    Reference intervals for this test were updated on 11/14/2023 to more accurately reflect our healthy population. There may be differences in the flagging of prior results with similar values performed with this method. Interpretation of those prior results can be made in the context of the updated reference intervals.    AST 01/16/2024 25  0 - 45 U/L Final    Reference intervals for this test were updated on 6/12/2023 to more accurately reflect our healthy population. There may be differences in the flagging of prior results with similar values performed with this method. Interpretation of those prior results can be made in the context of the updated reference intervals.    ALT 01/16/2024 19  0 - 50 U/L Final    Reference intervals for this test were updated on 6/12/2023 to more accurately reflect our healthy population. There may be differences in the flagging of prior results with similar values performed with this method. Interpretation of those prior results can be made in the context of the updated reference  intervals.      Protein Total 01/16/2024 6.9  6.4 - 8.3 g/dL Final    Albumin 01/16/2024 4.2  3.5 - 5.2 g/dL Final    Bilirubin Total 01/16/2024 0.4  <=1.2 mg/dL Final    CRP Inflammation 01/16/2024 <3.00  <5.00 mg/L Final    Erythrocyte Sedimentation Rate 01/16/2024 7  0 - 30 mm/hr Final    Lactate Dehydrogenase 01/16/2024 189  0 - 250 U/L Final    Cancer Antigen 15-3 01/16/2024 11  <=25 U/mL Final    This result is obtained using the Roche Elecsys CA 15-3 II method on the jorge [id number] immunoassay analyzer. Results obtained with different assay methods or kits cannot be used interchangeably.    WBC Count 01/16/2024 4.4  4.0 - 11.0 10e3/uL Final    RBC Count 01/16/2024 4.12  3.80 - 5.20 10e6/uL Final    Hemoglobin 01/16/2024 13.3  11.7 - 15.7 g/dL Final    Hematocrit 01/16/2024 39.5  35.0 - 47.0 % Final    MCV 01/16/2024 96  78 - 100 fL Final    MCH 01/16/2024 32.3  26.5 - 33.0 pg Final    MCHC 01/16/2024 33.7  31.5 - 36.5 g/dL Final    RDW 01/16/2024 13.7  10.0 - 15.0 % Final    Platelet Count 01/16/2024 224  150 - 450 10e3/uL Final    % Neutrophils 01/16/2024 63  % Final    % Lymphocytes 01/16/2024 19  % Final    % Monocytes 01/16/2024 13  % Final    % Eosinophils 01/16/2024 3  % Final    % Basophils 01/16/2024 2  % Final    % Immature Granulocytes 01/16/2024 0  % Final    NRBCs per 100 WBC 01/16/2024 0  <1 /100 Final    Absolute Neutrophils 01/16/2024 2.8  1.6 - 8.3 10e3/uL Final    Absolute Lymphocytes 01/16/2024 0.9  0.8 - 5.3 10e3/uL Final    Absolute Monocytes 01/16/2024 0.6  0.0 - 1.3 10e3/uL Final    Absolute Eosinophils 01/16/2024 0.2  0.0 - 0.7 10e3/uL Final    Absolute Basophils 01/16/2024 0.1  0.0 - 0.2 10e3/uL Final    Absolute Immature Granulocytes 01/16/2024 0.0  <=0.4 10e3/uL Final    Absolute NRBCs 01/16/2024 0.0  10e3/uL Final        ASSESSMENT/PLAN:  1. DCIS. Stage 0 malignant neoplasm of the central portion of the right breast, consistent with grade 2 DCIS 1.9 cm mass, status post lumpectomy.  The  patient refused adjuvant radiation therapy.  PET scan was essentially negative except for multiple nonspecific pulmonary nodules that were not hypermetabolic.  We offered the patient aromatase inhibitor; she refused.  Bone density scan did show osteopenia.  We recommended vitamin D and calcium.  She refused aromatase inhibitor therapy. Patient is aware of the risks of not taking aromatase inhibitor. Right mammogram was done on 9/19/23 and this was negative for malignancy. Labs done on 1/16/24 shows a normal tumor marker. Al other labs are stable. Will see patient in 6 months with repeat labs. Bilateral mammogram is due in March 2024, order placed.      2.  Pulmonary nodules.  Repeat CT chest was stable. It was felt that these are benign nodules. Patient does reports ongoing shortness of breath with activity and chest tightness. She was seen by cardiology. Stress test was normal. Repeat CT from 8/2023 was stable. Patient will be following up with pulmonology.       Thirty two minutes spent with this encounter with time spent reviewing patient records, counseling patient regarding disease process, interpretation and review of labs with patient, discussing recent stress test for shortness of breath and need for CT imaging, discussing plan for ongoing surveillance, obtaining a review of systems, performing a physical exam, ordering tests, documenting in EHR and coordination of care

## 2024-02-20 ENCOUNTER — OFFICE VISIT (OUTPATIENT)
Dept: INTERNAL MEDICINE | Facility: OTHER | Age: 84
End: 2024-02-20
Attending: NURSE PRACTITIONER
Payer: MEDICARE

## 2024-02-20 VITALS
DIASTOLIC BLOOD PRESSURE: 78 MMHG | SYSTOLIC BLOOD PRESSURE: 126 MMHG | HEART RATE: 81 BPM | WEIGHT: 160.2 LBS | RESPIRATION RATE: 16 BRPM | OXYGEN SATURATION: 95 % | HEIGHT: 65 IN | BODY MASS INDEX: 26.69 KG/M2 | TEMPERATURE: 97.7 F

## 2024-02-20 DIAGNOSIS — L57.0 AK (ACTINIC KERATOSIS): ICD-10-CM

## 2024-02-20 DIAGNOSIS — L71.9 ROSACEA: ICD-10-CM

## 2024-02-20 DIAGNOSIS — H61.91 LESION OF SKIN OF RIGHT EAR: Primary | ICD-10-CM

## 2024-02-20 PROCEDURE — 99212 OFFICE O/P EST SF 10 MIN: CPT | Mod: 25 | Performed by: NURSE PRACTITIONER

## 2024-02-20 PROCEDURE — 17000 DESTRUCT PREMALG LESION: CPT | Performed by: NURSE PRACTITIONER

## 2024-02-20 PROCEDURE — G0463 HOSPITAL OUTPT CLINIC VISIT: HCPCS

## 2024-02-20 RX ORDER — METRONIDAZOLE 7.5 MG/G
GEL TOPICAL DAILY
Qty: 45 G | Refills: 3 | Status: SHIPPED | OUTPATIENT
Start: 2024-02-20

## 2024-02-20 ASSESSMENT — PAIN SCALES - GENERAL: PAINLEVEL: NO PAIN (0)

## 2024-02-20 NOTE — NURSING NOTE
"Chief Complaint   Patient presents with    Follow Up       Initial /78 (BP Location: Right arm, Patient Position: Sitting, Cuff Size: Adult Regular)   Pulse 81   Temp 97.7  F (36.5  C) (Tympanic)   Resp 16   Ht 1.651 m (5' 5\")   Wt 72.7 kg (160 lb 3.2 oz)   LMP 10/11/2003 (Approximate)   SpO2 95%   BMI 26.66 kg/m   Estimated body mass index is 26.66 kg/m  as calculated from the following:    Height as of this encounter: 1.651 m (5' 5\").    Weight as of this encounter: 72.7 kg (160 lb 3.2 oz).  Medication Review: complete    The next two questions are to help us understand your food security.  If you are feeling you need any assistance in this area, we have resources available to support you today.          2/20/2024   SDOH- Food Insecurity   Within the past 12 months, did you worry that your food would run out before you got money to buy more? N   Within the past 12 months, did the food you bought just not last and you didn t have money to get more? N     Health Care Directive:  Patient has a Health Care Directive on file      Chayito Azul LPN      "

## 2024-02-20 NOTE — PROGRESS NOTES
ICD-10-CM    1. Lesion of skin of right ear  H61.91 DESTRUCT PREMALIGNANT LESION, FIRST      2. AK (actinic keratosis)  L57.0 DESTRUCT PREMALIGNANT LESION, FIRST      3. Rosacea  L71.9 metroNIDAZOLE (METROGEL) 0.75 % external gel      Plan: Cryotherapy completed.  Follow-up in 1 month, sooner with concerns.  If lesion still present then will refer to general surgeon.  She is not interested in seeking care from dermatology at this time.  MetroGel refilled.    Jacklyn Albarado is a 83 year old, presenting for the following health issues:  Follow Up      2/20/2024     9:36 AM   Additional Questions   Roomed by Chayito CASTILLO     She is here today to follow-up on a skin lesion of the right ear.  This was treated about a month ago with cryotherapy.  She states there is still a scab present.  She was seen by dermatology in the past.  She was not interested in following up with dermatology for the skin lesion.  She also needs a refill of MetroGel which she uses for rosacea.    History of Present Illness       Reason for visit:  Sore on ear    She eats 4 or more servings of fruits and vegetables daily.She exercises with enough effort to increase her heart rate 30 to 60 minutes per day.  She exercises with enough effort to increase her heart rate 4 days per week.   She is taking medications regularly.                     Objective    /78 (BP Location: Right arm, Patient Position: Sitting, Cuff Size: Adult Regular)   Pulse 81   Temp 97.7  F (36.5  C) (Tympanic)   Wt 72.7 kg (160 lb 3.2 oz)   LMP 10/11/2003 (Approximate)   BMI 26.66 kg/m    Body mass index is 26.66 kg/m .  Physical Exam   Pleasant female no acute distress.  Affect normal.  Alert and oriented.  Right ear pinnae with 2 small dry tan and brown raised lesions.  Cryotherapy x 2 completed and well-tolerated by patient            Signed Electronically by: Kaia Quinn NP

## 2024-03-07 ENCOUNTER — TRANSFERRED RECORDS (OUTPATIENT)
Dept: HEALTH INFORMATION MANAGEMENT | Facility: OTHER | Age: 84
End: 2024-03-07
Payer: COMMERCIAL

## 2024-03-12 ENCOUNTER — TRANSFERRED RECORDS (OUTPATIENT)
Dept: HEALTH INFORMATION MANAGEMENT | Facility: OTHER | Age: 84
End: 2024-03-12
Payer: COMMERCIAL

## 2024-03-13 ENCOUNTER — HOSPITAL ENCOUNTER (OUTPATIENT)
Dept: MAMMOGRAPHY | Facility: OTHER | Age: 84
Discharge: HOME | End: 2024-03-13
Attending: NURSE PRACTITIONER
Payer: MEDICARE

## 2024-03-13 ENCOUNTER — LAB (OUTPATIENT)
Dept: LAB | Facility: OTHER | Age: 84
End: 2024-03-13
Payer: MEDICARE

## 2024-03-13 DIAGNOSIS — C50.111 MALIGNANT NEOPLASM OF CENTRAL PORTION OF RIGHT BREAST IN FEMALE, ESTROGEN RECEPTOR POSITIVE (H): ICD-10-CM

## 2024-03-13 DIAGNOSIS — E83.52 HYPERCALCEMIA: ICD-10-CM

## 2024-03-13 DIAGNOSIS — D05.11 DUCTAL CARCINOMA IN SITU (DCIS) OF RIGHT BREAST: ICD-10-CM

## 2024-03-13 DIAGNOSIS — Z17.0 MALIGNANT NEOPLASM OF CENTRAL PORTION OF RIGHT BREAST IN FEMALE, ESTROGEN RECEPTOR POSITIVE (H): ICD-10-CM

## 2024-03-13 DIAGNOSIS — M81.0 AGE-RELATED OSTEOPOROSIS WITHOUT CURRENT PATHOLOGICAL FRACTURE: ICD-10-CM

## 2024-03-13 DIAGNOSIS — E07.9 DISEASE OF THYROID GLAND: ICD-10-CM

## 2024-03-13 LAB
ALBUMIN SERPL BCG-MCNC: 4.4 G/DL (ref 3.5–5.2)
ANION GAP SERPL CALCULATED.3IONS-SCNC: 11 MMOL/L (ref 7–15)
BUN SERPL-MCNC: 17.9 MG/DL (ref 8–23)
CALCIUM SERPL-MCNC: 10.1 MG/DL (ref 8.8–10.2)
CHLORIDE SERPL-SCNC: 103 MMOL/L (ref 98–107)
CREAT SERPL-MCNC: 1.14 MG/DL (ref 0.51–0.95)
DEPRECATED HCO3 PLAS-SCNC: 25 MMOL/L (ref 22–29)
EGFRCR SERPLBLD CKD-EPI 2021: 48 ML/MIN/1.73M2
GLUCOSE SERPL-MCNC: 95 MG/DL (ref 70–99)
HBA1C MFR BLD: 5.8 % (ref 4–6.2)
HOLD SPECIMEN: NORMAL
PHOSPHATE SERPL-MCNC: 3.4 MG/DL (ref 2.5–4.5)
POTASSIUM SERPL-SCNC: 4.4 MMOL/L (ref 3.4–5.3)
PTH-INTACT SERPL-MCNC: 55 PG/ML (ref 15–65)
SODIUM SERPL-SCNC: 139 MMOL/L (ref 135–145)
TSH SERPL DL<=0.005 MIU/L-ACNC: 3.2 UIU/ML (ref 0.3–4.2)

## 2024-03-13 PROCEDURE — 36415 COLL VENOUS BLD VENIPUNCTURE: CPT | Mod: ZL

## 2024-03-13 PROCEDURE — 83970 ASSAY OF PARATHORMONE: CPT | Mod: ZL

## 2024-03-13 PROCEDURE — 77062 BREAST TOMOSYNTHESIS BI: CPT

## 2024-03-13 PROCEDURE — 82040 ASSAY OF SERUM ALBUMIN: CPT | Mod: ZL

## 2024-03-13 PROCEDURE — 83036 HEMOGLOBIN GLYCOSYLATED A1C: CPT | Mod: ZL,GZ

## 2024-03-13 PROCEDURE — 84443 ASSAY THYROID STIM HORMONE: CPT | Mod: ZL

## 2024-03-20 ENCOUNTER — OFFICE VISIT (OUTPATIENT)
Dept: INTERNAL MEDICINE | Facility: OTHER | Age: 84
End: 2024-03-20
Attending: NURSE PRACTITIONER
Payer: MEDICARE

## 2024-03-20 VITALS
BODY MASS INDEX: 26.86 KG/M2 | WEIGHT: 161.2 LBS | TEMPERATURE: 97.2 F | RESPIRATION RATE: 16 BRPM | OXYGEN SATURATION: 97 % | SYSTOLIC BLOOD PRESSURE: 128 MMHG | HEART RATE: 78 BPM | HEIGHT: 65 IN | DIASTOLIC BLOOD PRESSURE: 76 MMHG

## 2024-03-20 DIAGNOSIS — H61.91 LESION OF SKIN OF RIGHT EAR: ICD-10-CM

## 2024-03-20 DIAGNOSIS — L57.0 AK (ACTINIC KERATOSIS): Primary | ICD-10-CM

## 2024-03-20 PROCEDURE — G0463 HOSPITAL OUTPT CLINIC VISIT: HCPCS

## 2024-03-20 PROCEDURE — 17000 DESTRUCT PREMALG LESION: CPT | Performed by: NURSE PRACTITIONER

## 2024-03-20 ASSESSMENT — PAIN SCALES - GENERAL: PAINLEVEL: MODERATE PAIN (4)

## 2024-03-20 NOTE — PROGRESS NOTES
"    ICD-10-CM    1. AK (actinic keratosis)  L57.0       2. Lesion of skin of right ear  H61.91       Plan: Cryotherapy completed.  Follow-up in 1 month, sooner with concerns.  Lesion smaller today.    Jacklny Albarado is a 83 year old, presenting for the following health issues:  RECHECK (Right ear)        2/20/2024     9:36 AM   Additional Questions   Roomed by Chayito CASTILLO     She is here today to follow-up on a skin lesion of the right ear.  This was treated about a month ago with cryotherapy.  She states there is still a scab present but seems smaller than previous.    History of Present Illness       Reason for visit:  Sore on ear    She eats 4 or more servings of fruits and vegetables daily.She consumes 1 sweetened beverage(s) daily.She exercises with enough effort to increase her heart rate 30 to 60 minutes per day.  She exercises with enough effort to increase her heart rate 4 days per week.   She is taking medications regularly.                     Objective    /76 (BP Location: Right arm, Patient Position: Sitting, Cuff Size: Adult Regular)   Pulse 78   Temp 97.2  F (36.2  C) (Tympanic)   Resp 16   Ht 1.651 m (5' 5\")   Wt 73.1 kg (161 lb 3.2 oz)   LMP 10/11/2003 (Approximate)   SpO2 97%   BMI 26.83 kg/m    Body mass index is 26.83 kg/m .  Physical Exam   Pleasant female no acute distress.  Affect normal.  Alert and oriented.  Right ear pinnae with 1 small dry tan and brown lesion.  Cryotherapy x 3 completed and well-tolerated by patient            Signed Electronically by: Kaia Quinn NP    "

## 2024-03-20 NOTE — NURSING NOTE
"Chief Complaint   Patient presents with    RECHECK     Right ear       FOOD SECURITY SCREENING QUESTIONS  Hunger Vital Signs:  Within the past 12 months we worried whether our food would run out before we got money to buy more. Never  Within the past 12 months the food we bought just didn't last and we didn't have money to get more. Never  Chayito Azul LPN 3/20/2024 8:31 AM      Initial /76 (BP Location: Right arm, Patient Position: Sitting, Cuff Size: Adult Regular)   Pulse 78   Temp 97.2  F (36.2  C) (Tympanic)   Resp 16   Ht 1.651 m (5' 5\")   Wt 73.1 kg (161 lb 3.2 oz)   LMP 10/11/2003 (Approximate)   SpO2 97%   BMI 26.83 kg/m   Estimated body mass index is 26.83 kg/m  as calculated from the following:    Height as of this encounter: 1.651 m (5' 5\").    Weight as of this encounter: 73.1 kg (161 lb 3.2 oz).  Medication Reconciliation: complete    Chayito Azul LPN    "

## 2024-04-12 ENCOUNTER — APPOINTMENT (OUTPATIENT)
Dept: MRI IMAGING | Facility: OTHER | Age: 84
End: 2024-04-12
Attending: PSYCHIATRY & NEUROLOGY
Payer: MEDICARE

## 2024-04-12 ENCOUNTER — HOSPITAL ENCOUNTER (EMERGENCY)
Facility: OTHER | Age: 84
Discharge: HOME | End: 2024-04-12
Attending: FAMILY MEDICINE | Admitting: FAMILY MEDICINE
Payer: MEDICARE

## 2024-04-12 ENCOUNTER — APPOINTMENT (OUTPATIENT)
Dept: CT IMAGING | Facility: OTHER | Age: 84
End: 2024-04-12
Attending: FAMILY MEDICINE
Payer: MEDICARE

## 2024-04-12 VITALS
SYSTOLIC BLOOD PRESSURE: 136 MMHG | DIASTOLIC BLOOD PRESSURE: 72 MMHG | RESPIRATION RATE: 25 BRPM | BODY MASS INDEX: 24.11 KG/M2 | OXYGEN SATURATION: 94 % | WEIGHT: 150 LBS | HEIGHT: 66 IN | TEMPERATURE: 96.6 F | HEART RATE: 76 BPM

## 2024-04-12 DIAGNOSIS — R42 DIZZINESS: ICD-10-CM

## 2024-04-12 LAB
ANION GAP SERPL CALCULATED.3IONS-SCNC: 10 MMOL/L (ref 7–15)
APTT PPP: 28 SECONDS (ref 22–38)
BASOPHILS # BLD AUTO: 0.1 10E3/UL (ref 0–0.2)
BASOPHILS NFR BLD AUTO: 1 %
BUN SERPL-MCNC: 17.8 MG/DL (ref 8–23)
CALCIUM SERPL-MCNC: 10.3 MG/DL (ref 8.8–10.2)
CHLORIDE SERPL-SCNC: 98 MMOL/L (ref 98–107)
CREAT SERPL-MCNC: 1.03 MG/DL (ref 0.51–0.95)
DEPRECATED HCO3 PLAS-SCNC: 27 MMOL/L (ref 22–29)
EGFRCR SERPLBLD CKD-EPI 2021: 54 ML/MIN/1.73M2
EOSINOPHIL # BLD AUTO: 0.1 10E3/UL (ref 0–0.7)
EOSINOPHIL NFR BLD AUTO: 3 %
ERYTHROCYTE [DISTWIDTH] IN BLOOD BY AUTOMATED COUNT: 13.4 % (ref 10–15)
GLUCOSE SERPL-MCNC: 123 MG/DL (ref 70–99)
HCT VFR BLD AUTO: 39.9 % (ref 35–47)
HGB BLD-MCNC: 12.9 G/DL (ref 11.7–15.7)
HOLD SPECIMEN: NORMAL
IMM GRANULOCYTES # BLD: 0 10E3/UL
IMM GRANULOCYTES NFR BLD: 0 %
INR PPP: 1.07 (ref 0.85–1.15)
LYMPHOCYTES # BLD AUTO: 0.7 10E3/UL (ref 0.8–5.3)
LYMPHOCYTES NFR BLD AUTO: 14 %
MCH RBC QN AUTO: 31.4 PG (ref 26.5–33)
MCHC RBC AUTO-ENTMCNC: 32.3 G/DL (ref 31.5–36.5)
MCV RBC AUTO: 97 FL (ref 78–100)
MONOCYTES # BLD AUTO: 0.6 10E3/UL (ref 0–1.3)
MONOCYTES NFR BLD AUTO: 11 %
NEUTROPHILS # BLD AUTO: 3.6 10E3/UL (ref 1.6–8.3)
NEUTROPHILS NFR BLD AUTO: 71 %
NRBC # BLD AUTO: 0 10E3/UL
NRBC BLD AUTO-RTO: 0 /100
PLATELET # BLD AUTO: 244 10E3/UL (ref 150–450)
POTASSIUM SERPL-SCNC: 4.2 MMOL/L (ref 3.4–5.3)
RBC # BLD AUTO: 4.11 10E6/UL (ref 3.8–5.2)
SODIUM SERPL-SCNC: 135 MMOL/L (ref 135–145)
TROPONIN T SERPL HS-MCNC: 10 NG/L
WBC # BLD AUTO: 5.1 10E3/UL (ref 4–11)

## 2024-04-12 PROCEDURE — 85610 PROTHROMBIN TIME: CPT | Performed by: FAMILY MEDICINE

## 2024-04-12 PROCEDURE — 99285 EMERGENCY DEPT VISIT HI MDM: CPT | Mod: 25 | Performed by: FAMILY MEDICINE

## 2024-04-12 PROCEDURE — 93005 ELECTROCARDIOGRAM TRACING: CPT | Performed by: FAMILY MEDICINE

## 2024-04-12 PROCEDURE — 80048 BASIC METABOLIC PNL TOTAL CA: CPT | Performed by: FAMILY MEDICINE

## 2024-04-12 PROCEDURE — 250N000011 HC RX IP 250 OP 636: Performed by: FAMILY MEDICINE

## 2024-04-12 PROCEDURE — 99207 PR NO BILLABLE SERVICE THIS VISIT: CPT | Performed by: PSYCHIATRY & NEUROLOGY

## 2024-04-12 PROCEDURE — 255N000002 HC RX 255 OP 636: Mod: JZ | Performed by: FAMILY MEDICINE

## 2024-04-12 PROCEDURE — 36415 COLL VENOUS BLD VENIPUNCTURE: CPT | Performed by: FAMILY MEDICINE

## 2024-04-12 PROCEDURE — 93010 ELECTROCARDIOGRAM REPORT: CPT | Performed by: INTERNAL MEDICINE

## 2024-04-12 PROCEDURE — 70450 CT HEAD/BRAIN W/O DYE: CPT | Mod: MG,XU

## 2024-04-12 PROCEDURE — 99284 EMERGENCY DEPT VISIT MOD MDM: CPT | Performed by: FAMILY MEDICINE

## 2024-04-12 PROCEDURE — 84484 ASSAY OF TROPONIN QUANT: CPT | Performed by: FAMILY MEDICINE

## 2024-04-12 PROCEDURE — 85730 THROMBOPLASTIN TIME PARTIAL: CPT | Mod: GZ | Performed by: FAMILY MEDICINE

## 2024-04-12 PROCEDURE — 70496 CT ANGIOGRAPHY HEAD: CPT | Mod: MG

## 2024-04-12 PROCEDURE — A9575 INJ GADOTERATE MEGLUMI 0.1ML: HCPCS | Mod: JZ | Performed by: FAMILY MEDICINE

## 2024-04-12 PROCEDURE — 85025 COMPLETE CBC W/AUTO DIFF WBC: CPT | Performed by: FAMILY MEDICINE

## 2024-04-12 PROCEDURE — 70553 MRI BRAIN STEM W/O & W/DYE: CPT | Mod: MA

## 2024-04-12 RX ORDER — GADOTERATE MEGLUMINE 376.9 MG/ML
15 INJECTION INTRAVENOUS ONCE
Status: COMPLETED | OUTPATIENT
Start: 2024-04-12 | End: 2024-04-12

## 2024-04-12 RX ORDER — IOPAMIDOL 755 MG/ML
75 INJECTION, SOLUTION INTRAVASCULAR ONCE
Status: COMPLETED | OUTPATIENT
Start: 2024-04-12 | End: 2024-04-12

## 2024-04-12 RX ADMIN — GADOTERATE MEGLUMINE 14 ML: 376.9 INJECTION INTRAVENOUS at 19:13

## 2024-04-12 RX ADMIN — IOPAMIDOL 75 ML: 755 INJECTION, SOLUTION INTRAVENOUS at 13:09

## 2024-04-12 ASSESSMENT — COLUMBIA-SUICIDE SEVERITY RATING SCALE - C-SSRS
2. HAVE YOU ACTUALLY HAD ANY THOUGHTS OF KILLING YOURSELF IN THE PAST MONTH?: NO
1. IN THE PAST MONTH, HAVE YOU WISHED YOU WERE DEAD OR WISHED YOU COULD GO TO SLEEP AND NOT WAKE UP?: NO
6. HAVE YOU EVER DONE ANYTHING, STARTED TO DO ANYTHING, OR PREPARED TO DO ANYTHING TO END YOUR LIFE?: NO

## 2024-04-12 ASSESSMENT — ACTIVITIES OF DAILY LIVING (ADL)
ADLS_ACUITY_SCORE: 36

## 2024-04-12 ASSESSMENT — ENCOUNTER SYMPTOMS
DIZZINESS: 1
LIGHT-HEADEDNESS: 1

## 2024-04-12 NOTE — PROGRESS NOTES
1.  Has the patient had a previous reaction to IV contrast? No    2.  Does the patient have kidney disease? Yes - CKD3 - GFR 54    3.  Is the patient on dialysis? No    If YES to any of these questions, exam will be reviewed with a Radiologist before administering contrast.  IV Contrast- Discharge Instructions After Your CT Scan      The IV contrast you received today will be filtered from your bloodstream by your kidneys during the next 24 hours and pass from the body in urine.  You will not be aware of this process and your urine will not change in color.  To help this process you should drink at least 4 additional glasses of water or juice today.  This reduces stress on your kidneys.    Most contrast reactions are immediate.  Should you develop symptoms of concern after discharge, contact the department at the number below.  After hours you should contact your personal physician.  If you develop breathing distress or wheezing, call 911.

## 2024-04-12 NOTE — ED TRIAGE NOTES
"Patient presents to ER with complaints of dizziness and generalized weakness after bending over to pick items out of her freezer. Patient states she is dizzy now at rest. Patient reports a headache 3/10 pain and history of strokes. /81   Pulse 74   Temp (!) 96.6  F (35.9  C) (Tympanic)   Resp 16   Ht 1.676 m (5' 6\")   Wt 68 kg (150 lb)   LMP 10/11/2003 (Approximate)   BMI 24.21 kg/m         Triage Assessment (Adult)       Row Name 04/12/24 1138          Triage Assessment    Airway WDL WDL        Respiratory WDL    Respiratory WDL WDL        Skin Circulation/Temperature WDL    Skin Circulation/Temperature WDL WDL        Cardiac WDL    Cardiac WDL WDL        Peripheral/Neurovascular WDL    Peripheral Neurovascular WDL WDL        Cognitive/Neuro/Behavioral WDL    Cognitive/Neuro/Behavioral WDL WDL        Sulaiman Coma Scale    Best Eye Response 4-->(E4) spontaneous     Best Motor Response 6-->(M6) obeys commands     Best Verbal Response 5-->(V5) oriented     Sulaiman Coma Scale Score 15                     "
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
## 2024-04-12 NOTE — CONSULTS
"[facility]    Stroke Telephone Note    I was called by Bob Casillas on 04/12/24 regarding patient Verena Wise. The patient is a 83 year old female with history of stroke who presents after developing dizziness after she was bending over.    Vitals  BP: 138/81   Pulse: 74   Resp: 16   Temp: (!) 96.6  F (35.9  C)   Weight: 68 kg (150 lb)    Imaging Findings  CT head: chronic stroke and microvascular disease  CTA head/neck: no hemodynamically significant stenosis    Impression  Vertigo--unclear etiology.  Symptoms sound peripheral, but patient has had stroke prior and has significant microvascular disease on her CT    Recommendations   MRI Brain w/wo contrast with coronal DWI (ordered for you)    My recommendations are based on the information provided over the phone by Verena Wise's in-person providers. They are not intended to replace the clinical judgment of her in-person providers. I was not requested to personally see or examine the patient at this time.     Rafael White MD, MS  Vascular Neurology    To page me or covering stroke neurology team member, click here: [facility]  Choose \"On Call\" tab at top, then select \"NEUROLOGY/ALL SITES\" from middle drop-down box, press Enter, then look for \"stroke\" or \"telestroke\" for your site.     I personally spent a total of 30 minutes on April 12, 2024 consulting with her  medical providers and coordinating care.  This includes personally reviewing the patient's medical record including diagnostic testing, neuroimaging, and laboratory studies.       "
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
## 2024-04-13 NOTE — DISCHARGE INSTRUCTIONS
No new stroke on MRI.  Please follow-up with your regular doctor and neurologist in the next few weeks to discuss ongoing medication management.  No changes today.  Try to take an aspirin 3 days/week if your stomach can tolerate it.  Continue your fish oil.

## 2024-04-14 LAB
ATRIAL RATE - MUSE: 66 BPM
DIASTOLIC BLOOD PRESSURE - MUSE: NORMAL MMHG
INTERPRETATION ECG - MUSE: NORMAL
P AXIS - MUSE: -27 DEGREES
PR INTERVAL - MUSE: 192 MS
QRS DURATION - MUSE: 80 MS
QT - MUSE: 396 MS
QTC - MUSE: 415 MS
R AXIS - MUSE: 27 DEGREES
SYSTOLIC BLOOD PRESSURE - MUSE: NORMAL MMHG
T AXIS - MUSE: 51 DEGREES
VENTRICULAR RATE- MUSE: 66 BPM

## 2024-04-15 ENCOUNTER — TELEPHONE (OUTPATIENT)
Dept: MEDSURG UNIT | Facility: OTHER | Age: 84
End: 2024-04-15
Payer: COMMERCIAL

## 2024-04-15 NOTE — TELEPHONE ENCOUNTER
Called and spoke with the patient. She needs additional lab work and will have that completed.   Chayito Azul LPN on 4/15/2024 at 2:39 PM

## 2024-04-15 NOTE — TELEPHONE ENCOUNTER
Patient stated that she had labs performed on 04/12/2024 when she was seen in the ER and is wondering if she still needs to come in for the labs tomorrow or if the labs that were done on the 12th will be forwarded to Dr. Ceballos. Please call.    Lashanda Chappell on 4/15/2024 at 2:08 PM

## 2024-04-16 ENCOUNTER — OFFICE VISIT (OUTPATIENT)
Dept: INTERNAL MEDICINE | Facility: OTHER | Age: 84
End: 2024-04-16
Attending: NURSE PRACTITIONER
Payer: MEDICARE

## 2024-04-16 ENCOUNTER — LAB (OUTPATIENT)
Dept: LAB | Facility: OTHER | Age: 84
End: 2024-04-16
Attending: INTERNAL MEDICINE
Payer: COMMERCIAL

## 2024-04-16 VITALS
WEIGHT: 159 LBS | TEMPERATURE: 97.6 F | SYSTOLIC BLOOD PRESSURE: 118 MMHG | BODY MASS INDEX: 25.55 KG/M2 | HEIGHT: 66 IN | OXYGEN SATURATION: 95 % | HEART RATE: 86 BPM | RESPIRATION RATE: 16 BRPM | DIASTOLIC BLOOD PRESSURE: 72 MMHG

## 2024-04-16 DIAGNOSIS — H93.90 LESION OF EAR: Primary | ICD-10-CM

## 2024-04-16 DIAGNOSIS — Z79.899 ENCOUNTER FOR LONG-TERM (CURRENT) USE OF HIGH-RISK MEDICATION: ICD-10-CM

## 2024-04-16 DIAGNOSIS — M05.79 SEROPOSITIVE RHEUMATOID ARTHRITIS OF MULTIPLE SITES (H): ICD-10-CM

## 2024-04-16 DIAGNOSIS — I63.9 CEREBROVASCULAR ACCIDENT (CVA), UNSPECIFIED MECHANISM (H): ICD-10-CM

## 2024-04-16 DIAGNOSIS — R42 DIZZINESS: ICD-10-CM

## 2024-04-16 LAB
ALBUMIN SERPL BCG-MCNC: 4.1 G/DL (ref 3.5–5.2)
ALP SERPL-CCNC: 76 U/L (ref 40–150)
ALT SERPL W P-5'-P-CCNC: 19 U/L (ref 0–50)
ANION GAP SERPL CALCULATED.3IONS-SCNC: 10 MMOL/L (ref 7–15)
AST SERPL W P-5'-P-CCNC: 28 U/L (ref 0–45)
BASOPHILS # BLD AUTO: 0.1 10E3/UL (ref 0–0.2)
BASOPHILS NFR BLD AUTO: 1 %
BILIRUB SERPL-MCNC: 0.3 MG/DL
BUN SERPL-MCNC: 13.9 MG/DL (ref 8–23)
CALCIUM SERPL-MCNC: 10.3 MG/DL (ref 8.8–10.2)
CHLORIDE SERPL-SCNC: 103 MMOL/L (ref 98–107)
CREAT SERPL-MCNC: 0.98 MG/DL (ref 0.51–0.95)
CRP SERPL-MCNC: <3 MG/L
DEPRECATED HCO3 PLAS-SCNC: 27 MMOL/L (ref 22–29)
EGFRCR SERPLBLD CKD-EPI 2021: 57 ML/MIN/1.73M2
EOSINOPHIL # BLD AUTO: 0.2 10E3/UL (ref 0–0.7)
EOSINOPHIL NFR BLD AUTO: 5 %
ERYTHROCYTE [DISTWIDTH] IN BLOOD BY AUTOMATED COUNT: 13.4 % (ref 10–15)
ERYTHROCYTE [SEDIMENTATION RATE] IN BLOOD BY WESTERGREN METHOD: 16 MM/HR (ref 0–30)
GLUCOSE SERPL-MCNC: 93 MG/DL (ref 70–99)
HCT VFR BLD AUTO: 40 % (ref 35–47)
HGB BLD-MCNC: 13 G/DL (ref 11.7–15.7)
IMM GRANULOCYTES # BLD: 0 10E3/UL
IMM GRANULOCYTES NFR BLD: 0 %
LYMPHOCYTES # BLD AUTO: 0.8 10E3/UL (ref 0.8–5.3)
LYMPHOCYTES NFR BLD AUTO: 16 %
MCH RBC QN AUTO: 31.6 PG (ref 26.5–33)
MCHC RBC AUTO-ENTMCNC: 32.5 G/DL (ref 31.5–36.5)
MCV RBC AUTO: 97 FL (ref 78–100)
MONOCYTES # BLD AUTO: 0.7 10E3/UL (ref 0–1.3)
MONOCYTES NFR BLD AUTO: 14 %
NEUTROPHILS # BLD AUTO: 3.1 10E3/UL (ref 1.6–8.3)
NEUTROPHILS NFR BLD AUTO: 64 %
NRBC # BLD AUTO: 0 10E3/UL
NRBC BLD AUTO-RTO: 0 /100
PLATELET # BLD AUTO: 250 10E3/UL (ref 150–450)
POTASSIUM SERPL-SCNC: 4.5 MMOL/L (ref 3.4–5.3)
PROT SERPL-MCNC: 6.8 G/DL (ref 6.4–8.3)
RBC # BLD AUTO: 4.11 10E6/UL (ref 3.8–5.2)
SODIUM SERPL-SCNC: 140 MMOL/L (ref 135–145)
WBC # BLD AUTO: 4.9 10E3/UL (ref 4–11)

## 2024-04-16 PROCEDURE — 99214 OFFICE O/P EST MOD 30 MIN: CPT | Performed by: NURSE PRACTITIONER

## 2024-04-16 PROCEDURE — G0463 HOSPITAL OUTPT CLINIC VISIT: HCPCS

## 2024-04-16 PROCEDURE — 86140 C-REACTIVE PROTEIN: CPT | Mod: ZL

## 2024-04-16 PROCEDURE — 84155 ASSAY OF PROTEIN SERUM: CPT | Mod: ZL

## 2024-04-16 PROCEDURE — 82374 ASSAY BLOOD CARBON DIOXIDE: CPT | Mod: ZL

## 2024-04-16 PROCEDURE — 85652 RBC SED RATE AUTOMATED: CPT | Mod: ZL

## 2024-04-16 PROCEDURE — 85025 COMPLETE CBC W/AUTO DIFF WBC: CPT | Mod: ZL

## 2024-04-16 PROCEDURE — 36415 COLL VENOUS BLD VENIPUNCTURE: CPT | Mod: ZL

## 2024-04-16 ASSESSMENT — PAIN SCALES - GENERAL: PAINLEVEL: MILD PAIN (2)

## 2024-04-16 NOTE — NURSING NOTE
"Chief Complaint   Patient presents with    RECHECK     ear       Initial /72 (BP Location: Right arm, Patient Position: Sitting, Cuff Size: Adult Regular)   Pulse 86   Temp 97.6  F (36.4  C) (Tympanic)   Resp 16   Ht 1.676 m (5' 6\")   Wt 72.1 kg (159 lb)   LMP 10/11/2003 (Approximate)   SpO2 95%   BMI 25.66 kg/m   Estimated body mass index is 25.66 kg/m  as calculated from the following:    Height as of this encounter: 1.676 m (5' 6\").    Weight as of this encounter: 72.1 kg (159 lb).  Medication Review: complete    The next two questions are to help us understand your food security.  If you are feeling you need any assistance in this area, we have resources available to support you today.          4/16/2024   SDOH- Food Insecurity   Within the past 12 months, did you worry that your food would run out before you got money to buy more? N   Within the past 12 months, did the food you bought just not last and you didn t have money to get more? N       Health Care Directive:  Patient has a Health Care Directive on file      Chayito Azul LPN      "

## 2024-04-16 NOTE — PROGRESS NOTES
"    ICD-10-CM    1. Lesion of ear  H93.90 Adult Dermatology  Referral      2. Dizziness  R42       3. Cerebrovascular accident (CVA), unspecified mechanism (H)  I63.9          Plan:  1.  Referral to dermatology for right ear lesion, concerning for BCC.  2.  Previous history of TIA/CVA.  Workup in ED negative.  Dizziness resolved.  She has been intolerant to aspirin and statin in the past.  She is not sure that she wants to proceed with restarting aspirin.    Jacklyn Albarado is a 84 year old, presenting for the following health issues:  RECHECK (ear)      4/16/2024     9:24 AM   Additional Questions   Roomed by Chayito CASTILLO     She is here today to follow-up on a right ear lesion and also ED visit.  She has had treatment with cryotherapy x 3 for right ear lesion.  This has gotten smaller but is not completely resolved.  It is in a sun-exposed area.  She also was in emergency department on April 12.  She had an episode where she was bending over and became very dizzy and nauseated.  She was brought into the ED by her .  Workup was negative for CVA.  She did have a small vessel ischemic disease that is chronic in nature.  No evidence of carotid stenosis.  She states she is feeling still a little lightheaded overall but other symptoms have completely resolved.    History of Present Illness       Reason for visit:  Treatment of pre cancerous sore on earShe consumes 0 sweetened beverage(s) daily.She exercises with enough effort to increase her heart rate 20 to 29 minutes per day.  She exercises with enough effort to increase her heart rate 5 days per week.   She is taking medications regularly.                     Objective    /72 (BP Location: Right arm, Patient Position: Sitting, Cuff Size: Adult Regular)   Pulse 86   Temp 97.6  F (36.4  C) (Tympanic)   Resp 16   Ht 1.676 m (5' 6\")   Wt 72.1 kg (159 lb)   LMP 10/11/2003 (Approximate)   SpO2 95%   BMI 25.66 kg/m    Body mass index is 25.66 " kg/m .  Physical Exam   Pleasant female no acute distress.  Affect normal.  Alert and oriented x 4.  Right ear pinna still has small dry firm lesion.  Slight tenderness.  No redness warmth or drainage.  Lung fields clear to auscultation.  Cardiovascular regular.  ED note reviewed and discussed.            Signed Electronically by: Kaia Quinn NP

## 2024-05-04 ENCOUNTER — HEALTH MAINTENANCE LETTER (OUTPATIENT)
Age: 84
End: 2024-05-04

## 2024-05-06 ENCOUNTER — NURSE TRIAGE (OUTPATIENT)
Dept: INTERNAL MEDICINE | Facility: OTHER | Age: 84
End: 2024-05-06
Payer: COMMERCIAL

## 2024-05-06 NOTE — TELEPHONE ENCOUNTER
I can see her in hospital follow up spot tomorrow if she would like otherwise could be seen in [facility].  Does not need EKG

## 2024-05-06 NOTE — TELEPHONE ENCOUNTER
Patient has an Endo appointment tomorrow morning at 0930; scheduled patient to see BINU Martin, at 1:40 PM. Nessa Groves RN on 5/6/2024 at 10:52 AM

## 2024-05-06 NOTE — TELEPHONE ENCOUNTER
DONAVON Arellano, NP -   Patient states she has an appointment tomorrow morning in [facility] and wondering if you could work her in this afternoon instead. Please review/advise.  Nessa Groves RN on 5/6/2024 at 9:12 AM

## 2024-05-06 NOTE — TELEPHONE ENCOUNTER
KIRSTEN Quinn NP -   Please review triage assessment questions. You have two hospital follow-up appointments open tomorrow. Would you be able to see patient in place of them? Also, would you approve of a nurse-only visit today for an EKG?   Nessa Groves RN on 5/6/2024 at 8:14 AM    BP retake after 5 minutes and alternate arm was 97/66,     Reason for Disposition   Brief dizziness or lightheadedness after standing up or eating    Additional Information   Negative: Systolic BP < 90 and feeling dizzy, lightheaded, or weak   Negative: Started suddenly after an allergic medicine, an allergic food, or bee sting   Negative: Shock suspected (e.g., cold/pale/clammy skin, too weak to stand, low BP, rapid pulse)   Negative: Difficult to awaken or acting confused (e.g., disoriented, slurred speech)   Negative: Fainted   Negative: Chest pain   Negative: Bleeding (e.g., vomiting blood, rectal bleeding or tarry stools, severe vaginal bleeding)   Negative: Extra heartbeats, irregular heart beating, or heart is beating very fast (i.e., 'palpitations')   Negative: Sounds like a life-threatening emergency to the triager   Negative: Systolic BP < 80 and NOT dizzy, lightheaded or weak (feels normal)   Negative: Abdominal pain   Negative: Major surgery in the past month   Negative: Fever > 100.4 F (38.0 C)   Negative: Drinking very little and dehydration suspected (e.g., no urine > 12 hours, very dry mouth, very lightheaded)   Negative: Fall in systolic BP > 20 mm Hg from normal and feeling dizzy, lightheaded, or weak   Negative: Patient sounds very sick or weak to the triager   Negative: Systolic BP < 90 and NOT dizzy, lightheaded or weak   Negative: Systolic BP  while taking blood pressure medications and NOT dizzy, lightheaded or weak   Negative: Fall in systolic BP > 20 mm Hg from normal and NOT dizzy, lightheaded, or weak   Negative: Fall in systolic BP > 20 mmHg after standing up   Negative: Fall in systolic BP > 20  "mmHg after eating a meal   Negative: Diastolic BP < 50 mm Hg    Answer Assessment - Initial Assessment Questions  1. BLOOD PRESSURE: \"What is your blood pressure?\" \"Did you take at least two measurements 5 minutes apart?\"      98/66 HR 98 at 0700, 0800 while on the phone with triager: 84/62 HR 95    2. ONSET: \"When did you take your blood pressure?\"      As above     3. HOW: \"How did you take your blood pressure?\" (e.g., visiting nurse, automatic home BP monitor)      Home monitor     4. HISTORY: \"Do you have a history of low blood pressure?\" \"What is your blood pressure normally?\"      This has been going on for the last week     5. MEDICINES: \"Are you taking any medicines for blood pressure?\" If Yes, ask: \"Have they been changed recently?\"      Denies     6. PULSE RATE: \"Do you know what your pulse rate is?\"       90's    7. OTHER SYMPTOMS: \"Have you been sick recently?\" \"Have you had a recent injury?\"      Has a cough, very low energy, feels like she has the flu    8. PREGNANCY: \"Is there any chance you are pregnant?\" \"When was your last menstrual period?\"      no    Protocols used: Blood Pressure - Low-A-OH    "
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
## 2024-05-07 ENCOUNTER — TRANSFERRED RECORDS (OUTPATIENT)
Dept: HEALTH INFORMATION MANAGEMENT | Facility: OTHER | Age: 84
End: 2024-05-07

## 2024-05-07 ENCOUNTER — OFFICE VISIT (OUTPATIENT)
Dept: FAMILY MEDICINE | Facility: OTHER | Age: 84
End: 2024-05-07
Attending: PHYSICIAN ASSISTANT
Payer: MEDICARE

## 2024-05-07 VITALS
TEMPERATURE: 98.5 F | HEIGHT: 66 IN | DIASTOLIC BLOOD PRESSURE: 66 MMHG | WEIGHT: 156 LBS | OXYGEN SATURATION: 96 % | BODY MASS INDEX: 25.07 KG/M2 | SYSTOLIC BLOOD PRESSURE: 110 MMHG | RESPIRATION RATE: 14 BRPM | HEART RATE: 98 BPM

## 2024-05-07 DIAGNOSIS — M81.0 AGE-RELATED OSTEOPOROSIS WITHOUT CURRENT PATHOLOGICAL FRACTURE: ICD-10-CM

## 2024-05-07 DIAGNOSIS — R53.83 FATIGUE, UNSPECIFIED TYPE: Primary | ICD-10-CM

## 2024-05-07 DIAGNOSIS — R00.2 PALPITATIONS: ICD-10-CM

## 2024-05-07 DIAGNOSIS — R51.9 NONINTRACTABLE HEADACHE, UNSPECIFIED CHRONICITY PATTERN, UNSPECIFIED HEADACHE TYPE: ICD-10-CM

## 2024-05-07 DIAGNOSIS — I95.9 HYPOTENSION, UNSPECIFIED HYPOTENSION TYPE: ICD-10-CM

## 2024-05-07 DIAGNOSIS — M06.9 RHEUMATOID ARTHRITIS INVOLVING MULTIPLE SITES, UNSPECIFIED WHETHER RHEUMATOID FACTOR PRESENT (H): ICD-10-CM

## 2024-05-07 PROBLEM — Z86.73 HISTORY OF STROKE: Status: RESOLVED | Noted: 2021-09-02 | Resolved: 2024-05-07

## 2024-05-07 PROBLEM — Z11.52 ENCOUNTER FOR SCREENING LABORATORY TESTING FOR COVID-19 VIRUS: Status: RESOLVED | Noted: 2021-03-12 | Resolved: 2024-05-07

## 2024-05-07 LAB
ALBUMIN SERPL BCG-MCNC: 4 G/DL (ref 3.5–5.2)
ALP SERPL-CCNC: 92 U/L (ref 40–150)
ALT SERPL W P-5'-P-CCNC: 20 U/L (ref 0–50)
ANION GAP SERPL CALCULATED.3IONS-SCNC: 11 MMOL/L (ref 7–15)
AST SERPL W P-5'-P-CCNC: 25 U/L (ref 0–45)
ATRIAL RATE - MUSE: 85 BPM
BASOPHILS # BLD AUTO: 0.1 10E3/UL (ref 0–0.2)
BASOPHILS NFR BLD AUTO: 1 %
BILIRUB SERPL-MCNC: 0.3 MG/DL
BUN SERPL-MCNC: 16.8 MG/DL (ref 8–23)
CALCIUM SERPL-MCNC: 10 MG/DL (ref 8.8–10.2)
CHLORIDE SERPL-SCNC: 98 MMOL/L (ref 98–107)
CREAT SERPL-MCNC: 0.99 MG/DL (ref 0.51–0.95)
DEPRECATED HCO3 PLAS-SCNC: 27 MMOL/L (ref 22–29)
DIASTOLIC BLOOD PRESSURE - MUSE: NORMAL MMHG
EGFRCR SERPLBLD CKD-EPI 2021: 56 ML/MIN/1.73M2
EOSINOPHIL # BLD AUTO: 0.4 10E3/UL (ref 0–0.7)
EOSINOPHIL NFR BLD AUTO: 7 %
ERYTHROCYTE [DISTWIDTH] IN BLOOD BY AUTOMATED COUNT: 12.9 % (ref 10–15)
GLUCOSE SERPL-MCNC: 118 MG/DL (ref 70–99)
HCT VFR BLD AUTO: 38.1 % (ref 35–47)
HGB BLD-MCNC: 12.7 G/DL (ref 11.7–15.7)
IMM GRANULOCYTES # BLD: 0 10E3/UL
IMM GRANULOCYTES NFR BLD: 0 %
INTERPRETATION ECG - MUSE: NORMAL
LYMPHOCYTES # BLD AUTO: 0.8 10E3/UL (ref 0.8–5.3)
LYMPHOCYTES NFR BLD AUTO: 14 %
MAGNESIUM SERPL-MCNC: 2.3 MG/DL (ref 1.7–2.3)
MCH RBC QN AUTO: 32.5 PG (ref 26.5–33)
MCHC RBC AUTO-ENTMCNC: 33.3 G/DL (ref 31.5–36.5)
MCV RBC AUTO: 97 FL (ref 78–100)
MONOCYTES # BLD AUTO: 0.9 10E3/UL (ref 0–1.3)
MONOCYTES NFR BLD AUTO: 14 %
NEUTROPHILS # BLD AUTO: 3.8 10E3/UL (ref 1.6–8.3)
NEUTROPHILS NFR BLD AUTO: 63 %
NRBC # BLD AUTO: 0 10E3/UL
NRBC BLD AUTO-RTO: 0 /100
P AXIS - MUSE: 47 DEGREES
PLATELET # BLD AUTO: 265 10E3/UL (ref 150–450)
POTASSIUM SERPL-SCNC: 4 MMOL/L (ref 3.4–5.3)
PR INTERVAL - MUSE: 164 MS
PROT SERPL-MCNC: 7.3 G/DL (ref 6.4–8.3)
QRS DURATION - MUSE: 76 MS
QT - MUSE: 348 MS
QTC - MUSE: 414 MS
R AXIS - MUSE: 57 DEGREES
RBC # BLD AUTO: 3.91 10E6/UL (ref 3.8–5.2)
SODIUM SERPL-SCNC: 136 MMOL/L (ref 135–145)
SYSTOLIC BLOOD PRESSURE - MUSE: NORMAL MMHG
T AXIS - MUSE: 60 DEGREES
TSH SERPL DL<=0.005 MIU/L-ACNC: 2.78 UIU/ML (ref 0.3–4.2)
VENTRICULAR RATE- MUSE: 85 BPM
VIT B12 SERPL-MCNC: 1637 PG/ML (ref 232–1245)
WBC # BLD AUTO: 6 10E3/UL (ref 4–11)

## 2024-05-07 PROCEDURE — 86618 LYME DISEASE ANTIBODY: CPT | Mod: ZL | Performed by: PHYSICIAN ASSISTANT

## 2024-05-07 PROCEDURE — 84080 ASSAY ALKALINE PHOSPHATASES: CPT | Mod: ZL | Performed by: PHYSICIAN ASSISTANT

## 2024-05-07 PROCEDURE — 36415 COLL VENOUS BLD VENIPUNCTURE: CPT | Mod: ZL | Performed by: PHYSICIAN ASSISTANT

## 2024-05-07 PROCEDURE — 82306 VITAMIN D 25 HYDROXY: CPT | Mod: ZL | Performed by: PHYSICIAN ASSISTANT

## 2024-05-07 PROCEDURE — 82607 VITAMIN B-12: CPT | Mod: ZL | Performed by: PHYSICIAN ASSISTANT

## 2024-05-07 PROCEDURE — 83521 IG LIGHT CHAINS FREE EACH: CPT | Mod: ZL,91 | Performed by: PHYSICIAN ASSISTANT

## 2024-05-07 PROCEDURE — 93005 ELECTROCARDIOGRAM TRACING: CPT | Performed by: PHYSICIAN ASSISTANT

## 2024-05-07 PROCEDURE — 93010 ELECTROCARDIOGRAM REPORT: CPT | Performed by: INTERNAL MEDICINE

## 2024-05-07 PROCEDURE — 87484 EHRLICHA CHAFFEENSIS AMP PRB: CPT | Mod: ZL | Performed by: PHYSICIAN ASSISTANT

## 2024-05-07 PROCEDURE — 82040 ASSAY OF SERUM ALBUMIN: CPT | Mod: ZL | Performed by: PHYSICIAN ASSISTANT

## 2024-05-07 PROCEDURE — 85049 AUTOMATED PLATELET COUNT: CPT | Mod: ZL | Performed by: PHYSICIAN ASSISTANT

## 2024-05-07 PROCEDURE — 83970 ASSAY OF PARATHORMONE: CPT | Mod: ZL | Performed by: PHYSICIAN ASSISTANT

## 2024-05-07 PROCEDURE — 82330 ASSAY OF CALCIUM: CPT | Mod: ZL | Performed by: PHYSICIAN ASSISTANT

## 2024-05-07 PROCEDURE — 84443 ASSAY THYROID STIM HORMONE: CPT | Mod: ZL | Performed by: PHYSICIAN ASSISTANT

## 2024-05-07 PROCEDURE — 83735 ASSAY OF MAGNESIUM: CPT | Mod: ZL | Performed by: PHYSICIAN ASSISTANT

## 2024-05-07 PROCEDURE — 99213 OFFICE O/P EST LOW 20 MIN: CPT | Performed by: PHYSICIAN ASSISTANT

## 2024-05-07 PROCEDURE — G0463 HOSPITAL OUTPT CLINIC VISIT: HCPCS

## 2024-05-07 ASSESSMENT — PAIN SCALES - GENERAL: PAINLEVEL: NO PAIN (0)

## 2024-05-07 NOTE — NURSING NOTE
Patient presents to clinic with low blood pressure.  Katie Rich LPN ....................  5/7/2024   1:09 PM  EXT [id number]

## 2024-05-08 LAB
CA-I BLD-MCNC: 5.3 MG/DL (ref 4.4–5.2)
PTH-INTACT SERPL-MCNC: 30 PG/ML (ref 15–65)
VIT D+METAB SERPL-MCNC: 42 NG/ML (ref 20–50)

## 2024-05-09 LAB
ALP BONE SERPL-MCNC: 10 UG/L
B BURGDOR IGG+IGM SER QL: 0.1
KAPPA LC FREE SER-MCNC: 2.34 MG/DL (ref 0.33–1.94)
KAPPA LC FREE/LAMBDA FREE SER NEPH: 0.98 {RATIO} (ref 0.26–1.65)
LAMBDA LC FREE SERPL-MCNC: 2.38 MG/DL (ref 0.57–2.63)

## 2024-05-10 LAB
A PHAGOCYTOPH DNA BLD QL NAA+PROBE: NOT DETECTED
E CHAFFEENSIS DNA BLD QL NAA+PROBE: NOT DETECTED
E EWINGII DNA SPEC QL NAA+PROBE: NOT DETECTED
EHRLICHIA DNA SPEC QL NAA+PROBE: NOT DETECTED

## 2024-05-13 ENCOUNTER — TELEPHONE (OUTPATIENT)
Dept: MEDSURG UNIT | Facility: OTHER | Age: 84
End: 2024-05-13
Payer: COMMERCIAL

## 2024-05-13 ENCOUNTER — TELEPHONE (OUTPATIENT)
Dept: FAMILY MEDICINE | Facility: OTHER | Age: 84
End: 2024-05-13
Payer: COMMERCIAL
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
## 2024-05-13 NOTE — TELEPHONE ENCOUNTER
"S-(situation): Patient states she is feeling increasing in symptoms that she was seen for on 5/7/24.    B-(background): Patient was seen by TONY SCHMID for fatigue, shortness of breath, and feeling \"lousy\". Patient had cardiac and lab work up. Patient is calling back stating her blood pressure cuff is saying her heart beat is \"out of sync\"     A-(assessment): continuation of symptoms from previous clinic visit    R-(recommendations): Discussed with provider. Patient is unwilling to go to ED for evaluation.. Clinic appointment made for 5/14/24 to follow up on symptoms.    Fozia Mora RN on 5/13/2024 at 2:52 PM      "

## 2024-05-14 ENCOUNTER — OFFICE VISIT (OUTPATIENT)
Dept: FAMILY MEDICINE | Facility: OTHER | Age: 84
End: 2024-05-14
Attending: PHYSICIAN ASSISTANT
Payer: COMMERCIAL

## 2024-05-14 ENCOUNTER — ORDERS ONLY (AUTO-RELEASED) (OUTPATIENT)
Dept: FAMILY MEDICINE | Facility: OTHER | Age: 84
End: 2024-05-14

## 2024-05-14 VITALS
RESPIRATION RATE: 14 BRPM | HEART RATE: 82 BPM | WEIGHT: 155.6 LBS | TEMPERATURE: 96.9 F | HEIGHT: 66 IN | BODY MASS INDEX: 25.01 KG/M2 | OXYGEN SATURATION: 94 %

## 2024-05-14 DIAGNOSIS — R07.89 CHEST TIGHTNESS: Primary | ICD-10-CM

## 2024-05-14 DIAGNOSIS — R53.83 FATIGUE, UNSPECIFIED TYPE: ICD-10-CM

## 2024-05-14 DIAGNOSIS — Z12.11 SPECIAL SCREENING FOR MALIGNANT NEOPLASMS, COLON: ICD-10-CM

## 2024-05-14 DIAGNOSIS — R10.84 ABDOMINAL PAIN, GENERALIZED: ICD-10-CM

## 2024-05-14 DIAGNOSIS — R00.2 PALPITATIONS: ICD-10-CM

## 2024-05-14 DIAGNOSIS — Z80.0 FAMILY HISTORY OF COLON CANCER: ICD-10-CM

## 2024-05-14 DIAGNOSIS — I95.9 HYPOTENSION, UNSPECIFIED HYPOTENSION TYPE: ICD-10-CM

## 2024-05-14 PROCEDURE — G0463 HOSPITAL OUTPT CLINIC VISIT: HCPCS

## 2024-05-14 PROCEDURE — 99213 OFFICE O/P EST LOW 20 MIN: CPT | Performed by: PHYSICIAN ASSISTANT

## 2024-05-14 PROCEDURE — 999N000157 HC STATISTIC RCP TIME EA 10 MIN

## 2024-05-14 NOTE — PROGRESS NOTES
Patient arrived (date)05/14/2024 (time)1130 for a 14 day Zio monitor per (provider name) [facility] for Chest tightness (Dx).  Patient's skin was prepped per protocol.  Zio monitor was placed.  Patient verbalized understanding of instructions and plan of care.  Patient was given Zio diary and prepaid .  Respiratory Therapist reviewed Zio Patch placement process and asked patient if they are comfortable proceeding with placement. Patient (is or is not) is comfortable proceeding. Patient (would or would not) would not like an employee of same gender to be (present or to place) present or to place the Zio Patch.  Documentation of Zio Patch site (Bleeding or Not Bleeding) Not Bleeding  If there is bleeding Documentation of why. N/A  Documentation of accomodation made for patient. No    Ivonne Dutton RT

## 2024-05-14 NOTE — NURSING NOTE
Patient presents to clinic for follow up on low blood pressure. Patient brought home B/P cuff in to compare to manual B/P today. I did ask patient to apply her home Blood Pressure cuff on right arm. When patient placed it on arm, I did correct placement and how to tighten cuff. Patient didn't seem to happy with my advice, Home cuff had similar readings to clinic reading.  Katie Rich LPN ....................  5/14/2024   10:48 AM  EXT [id number]

## 2024-05-17 ENCOUNTER — TRANSFERRED RECORDS (OUTPATIENT)
Dept: HEALTH INFORMATION MANAGEMENT | Facility: OTHER | Age: 84
End: 2024-05-17
Payer: COMMERCIAL

## 2024-06-06 PROCEDURE — 93248 EXT ECG>7D<15D REV&INTERPJ: CPT | Performed by: INTERNAL MEDICINE

## 2024-06-07 LAB — NONINV COLON CA DNA+OCC BLD SCRN STL QL: NEGATIVE

## 2024-06-26 RX ORDER — RESPIRATORY SYNCYTIAL VIRUS VACCINE 120MCG/0.5
0.5 KIT INTRAMUSCULAR ONCE
Qty: 1 EACH | Refills: 0 | Status: CANCELLED | OUTPATIENT
Start: 2024-06-26 | End: 2024-06-26

## 2024-06-26 NOTE — PATIENT INSTRUCTIONS
"Patient Education   Preventive Care Advice   This is general advice we often give to help people stay healthy. Your care team may have specific advice just for you. Please talk to your care team about your own preventive care needs.  Lifestyle  Exercise at least 150 minutes each week (30 minutes a day, 5 days a week).  Do muscle strengthening activities 2 days a week. These help control your weight and prevent disease.  No smoking.  Wear sunscreen to prevent skin cancer.  Have your home tested for radon every 2 to 5 years. Radon is a colorless, odorless gas that can harm your lungs. To learn more, go to www.health.[facility].[place] and search for \"Radon in Homes.\"  Keep guns unloaded and locked up in a safe place like a safe or gun vault, or, use a gun lock and hide the keys. Always lock away bullets separately. To learn more, visit [employer].[place].gov and search for \"safe gun storage.\"  Nutrition  Eat 5 or more servings of fruits and vegetables each day.  Try wheat bread, brown rice and whole grain pasta (instead of white bread, rice, and pasta).  Get enough calcium and vitamin D. Check the label on foods and aim for 100% of the RDA (recommended daily allowance).  Regular exams  Have a dental exam and cleaning every 6 months.  See your health care team every year to talk about:  Any changes in your health.  Any medicines your care team has prescribed.  Preventive care, family planning, and ways to prevent chronic diseases.  Shots (vaccines)   HPV shots (up to age 26), if you've never had them before.  Hepatitis B shots (up to age 59), if you've never had them before.  COVID-19 shot: Get this shot when it's due.  Flu shot: Get a flu shot every year.  Tetanus shot: Get a tetanus shot every 10 years.  Pneumococcal, hepatitis A, and RSV shots: Ask your care team if you need these based on your risk.  Shingles shot (for age 50 and up).  General health tests  Diabetes screening:  Starting at age 35, Get screened for diabetes at least " every 3 years.  If you are younger than age 35, ask your care team if you should be screened for diabetes.  Cholesterol test: At age 39, start having a cholesterol test every 5 years, or more often if advised.  Bone density scan (DEXA): At age 50, ask your care team if you should have this scan for osteoporosis (brittle bones).  Hepatitis C: Get tested at least once in your life.  Abdominal aortic aneurysm screening: Talk to your doctor about having this screening if you:  Have ever smoked; and  Are biologically male; and  Are between the ages of 65 and 75.  STIs (sexually transmitted infections)  Before age 24: Ask your care team if you should be screened for STIs.  After age 24: Get screened for STIs if you're at risk. You are at risk for STIs (including HIV) if:  You are sexually active with more than one person.  You don't use condoms every time.  You or a partner was diagnosed with a sexually transmitted infection.  If you are at risk for HIV, ask about PrEP medicine to prevent HIV.  Get tested for HIV at least once in your life, whether you are at risk for HIV or not.  Cancer screening tests  Cervical cancer screening: If you have a cervix, begin getting regular cervical cancer screening tests at age 21. Most people who have regular screenings with normal results can stop after age 65. Talk about this with your provider.  Breast cancer scan (mammogram): If you've ever had breasts, begin having regular mammograms starting at age 40. This is a scan to check for breast cancer.  Colon cancer screening: It is important to start screening for colon cancer at age 45.  Have a colonoscopy test every 10 years (or more often if you're at risk) Or, ask your provider about stool tests like a FIT test every year or Cologuard test every 3 years.  To learn more about your testing options, visit: www.[employer]â€™er/271065.pdf.  For help making a decision, visit: nuha/mt84126.  Prostate cancer screening test: If you have a  prostate and are age 55 to 69, ask your provider if you would benefit from a yearly prostate cancer screening test.  Lung cancer screening: If you are a current or former smoker age 50 to 80, ask your care team if ongoing lung cancer screenings are right for you.  For informational purposes only. Not to replace the advice of your health care provider. Copyright   2023 [facility]. All rights reserved. Clinically reviewed by the [facility] Transitions Program. AttorneyFee [id number] - REV 04/24.

## 2024-06-27 ENCOUNTER — OFFICE VISIT (OUTPATIENT)
Dept: INTERNAL MEDICINE | Facility: OTHER | Age: 84
End: 2024-06-27
Attending: NURSE PRACTITIONER
Payer: COMMERCIAL

## 2024-06-27 VITALS
OXYGEN SATURATION: 95 % | WEIGHT: 152.6 LBS | HEART RATE: 76 BPM | DIASTOLIC BLOOD PRESSURE: 82 MMHG | SYSTOLIC BLOOD PRESSURE: 128 MMHG | BODY MASS INDEX: 25.43 KG/M2 | TEMPERATURE: 97.1 F | HEIGHT: 65 IN | RESPIRATION RATE: 14 BRPM

## 2024-06-27 DIAGNOSIS — I47.10 PAROXYSMAL SUPRAVENTRICULAR TACHYCARDIA (H): ICD-10-CM

## 2024-06-27 DIAGNOSIS — M25.50 ARTHRALGIA, UNSPECIFIED JOINT: ICD-10-CM

## 2024-06-27 DIAGNOSIS — E78.49 OTHER HYPERLIPIDEMIA: ICD-10-CM

## 2024-06-27 DIAGNOSIS — Z17.0 MALIGNANT NEOPLASM OF CENTRAL PORTION OF RIGHT BREAST IN FEMALE, ESTROGEN RECEPTOR POSITIVE (H): ICD-10-CM

## 2024-06-27 DIAGNOSIS — L08.9 LOCAL INFECTION OF SKIN AND SUBCUTANEOUS TISSUE: ICD-10-CM

## 2024-06-27 DIAGNOSIS — M06.9 RHEUMATOID ARTHRITIS INVOLVING MULTIPLE SITES, UNSPECIFIED WHETHER RHEUMATOID FACTOR PRESENT (H): ICD-10-CM

## 2024-06-27 DIAGNOSIS — H93.91 LESION OF RIGHT EAR: ICD-10-CM

## 2024-06-27 DIAGNOSIS — Z00.00 ENCOUNTER FOR MEDICARE ANNUAL WELLNESS EXAM: ICD-10-CM

## 2024-06-27 DIAGNOSIS — M35.00 SJOGREN'S SYNDROME, WITH UNSPECIFIED ORGAN INVOLVEMENT (H): ICD-10-CM

## 2024-06-27 DIAGNOSIS — Z71.85 VACCINE COUNSELING: ICD-10-CM

## 2024-06-27 DIAGNOSIS — C50.111 MALIGNANT NEOPLASM OF CENTRAL PORTION OF RIGHT BREAST IN FEMALE, ESTROGEN RECEPTOR POSITIVE (H): ICD-10-CM

## 2024-06-27 DIAGNOSIS — N18.31 STAGE 3A CHRONIC KIDNEY DISEASE (H): ICD-10-CM

## 2024-06-27 DIAGNOSIS — Z13.220 SCREENING FOR HYPERLIPIDEMIA: Primary | ICD-10-CM

## 2024-06-27 PROCEDURE — G0463 HOSPITAL OUTPT CLINIC VISIT: HCPCS | Performed by: NURSE PRACTITIONER

## 2024-06-27 PROCEDURE — G0439 PPPS, SUBSEQ VISIT: HCPCS | Performed by: NURSE PRACTITIONER

## 2024-06-27 PROCEDURE — 99214 OFFICE O/P EST MOD 30 MIN: CPT | Mod: 25 | Performed by: NURSE PRACTITIONER

## 2024-06-27 RX ORDER — BETAMETHASONE DIPROPIONATE 0.5 MG/G
CREAM TOPICAL 2 TIMES DAILY
COMMUNITY
Start: 2024-06-04

## 2024-06-27 RX ORDER — HYDROXYCHLOROQUINE SULFATE 200 MG/1
200 TABLET, FILM COATED ORAL EVERY OTHER DAY
Status: SHIPPED
Start: 2024-06-27

## 2024-06-27 RX ORDER — CEPHALEXIN 500 MG/1
500 CAPSULE ORAL 3 TIMES DAILY
Qty: 15 CAPSULE | Refills: 0 | Status: SHIPPED | OUTPATIENT
Start: 2024-06-27 | End: 2024-07-02

## 2024-06-27 SDOH — HEALTH STABILITY: PHYSICAL HEALTH: ON AVERAGE, HOW MANY DAYS PER WEEK DO YOU ENGAGE IN MODERATE TO STRENUOUS EXERCISE (LIKE A BRISK WALK)?: 0 DAYS

## 2024-06-27 ASSESSMENT — SOCIAL DETERMINANTS OF HEALTH (SDOH): HOW OFTEN DO YOU GET TOGETHER WITH FRIENDS OR RELATIVES?: ONCE A WEEK

## 2024-06-27 ASSESSMENT — PAIN SCALES - GENERAL: PAINLEVEL: MODERATE PAIN (4)

## 2024-06-27 NOTE — PROGRESS NOTES
Preventive Care Visit  [facility] AND [facility]  Kaia Quinn NP, Internal Medicine  Jun 27, 2024      Assessment & Plan     ICD-10-CM    1. Screening for hyperlipidemia  Z13.220       2. Other hyperlipidemia  E78.49       3. Vaccine counseling  Z71.85       4. Encounter for Medicare annual wellness exam  Z00.00       5. Lesion of right ear  H93.91       6. Arthralgia, unspecified joint  M25.50       7. Local infection of skin and subcutaneous tissue  L08.9 cephALEXin (KEFLEX) 500 MG capsule      8. Rheumatoid arthritis involving multiple sites, unspecified whether rheumatoid factor present (H)  M06.9 hydroxychloroquine (PLAQUENIL) 200 MG tablet      9. Paroxysmal supraventricular tachycardia (H24)  I47.10       10. Malignant neoplasm of central portion of right breast in female, estrogen receptor positive (H)  C50.111     Z17.0       11. Stage 3a chronic kidney disease (H)  N18.31       12. Sjogren's syndrome, with unspecified organ involvement (H24)  M35.00       1.  Patient declined lipid panel.  She is intolerant to statins.  She follows a low-fat, low-cholesterol diet and stays active.  2.  Immunocompromise secondary to methotrexate and Plaquenil and advanced age.  She is found that the skin lesion right ear has become more painful and swollen since a few days after the biopsy.  Will treat with Keflex 500 mg 3 times daily for 5 days.  Keep follow-up appointment next month with dermatology.  Follow-up sooner with concerns.  3.  Continue to follow with rheumatology.  She is found improvement with daily flulike symptoms muscle and bone aches since reducing hydroxychloroquine to every other day.  She will discuss this with her rheumatologist.  4.  Zio patch results reviewed and discussed with patient.  Could consider medication to help with symptoms however at this time she states since reducing the hydroxychloroquine that the palpitation symptoms have completely resolved.  She would like to  "continue to monitor for now.  5.  Previous history of breast cancer.  Continue to follow with oncology.      Encounter for Medicare annual wellness exam    Wellness Visit Notes:    -Mammo done 3/13/24 (impression: BI-RADS CATEGORY: 2 - Benign.). Will get annually.     -DEXA done 6/8/22 (impression: osteopenia; most negative T-score of -2.3 at right femoral neck).  Scheduled 7/10/24.    -Colon cancer screening done via cologuard on 6/3/24(impression: normal). Follow-up 3 years. Due 6/3/2027.    -Immunizations: Pt is due for COVID vaccine, due for Shingles vaccine, however advised pt to complete Shingles vaccine at local pharmacy given Medicare insurance, due for TDAP vaccine, however advised pt to complete TDAP vaccine at a local pharmacy given Medicare insurance, and due for RSV vaccine, however advised pt to complete RSV vaccine at local pharmacy given Medicare insurance.   -Patient declines vaccines and requests that they be removed from her health maintenance.     -Education: Pt education provided on Fall Prevention  -Derm: Does patient regularly see dermatologist? Yes, follow-up in a couple of weeks.   -Refills not needed per patient.   -Labs pended.   -ADL's: gardens a lot, does bone builders 3 x weekly. Walking frequently. BP good at home.     Patient has been advised of split billing requirements and indicates understanding: Yes        BMI  Estimated body mass index is 25.79 kg/m  as calculated from the following:    Height as of this encounter: 1.638 m (5' 4.5\").    Weight as of this encounter: 69.2 kg (152 lb 9.6 oz).       Counseling  Appropriate preventive services were discussed with this patient, including applicable screening as appropriate for fall prevention, nutrition, physical activity, Tobacco-use cessation, weight loss and cognition.  Checklist reviewing preventive services available has been given to the patient.  Reviewed patient's diet, addressing concerns and/or questions.   The patient was " instructed to see the dentist every 6 months.   She is at risk for psychosocial distress and has been provided with information to reduce risk.       Work on weight loss  Regular exercise    No follow-ups on file.    Jacklyn Albarado is a 84 year old, presenting for the following:  Medicare Visit  She is seen today for Medicare wellness visit and chronic disease management.  Has previous history of hyperlipidemia.  She does not tolerate statins.  Also was seen recently secondary to heart palpitations and sling body aches.  She had workup completed.  She was found to have PSVT.  She states she reduced hydroxychloroquine from daily down to twice daily and daily flulike symptoms, body aches and heart palpitations have completely resolved.  She has an appointment scheduled with her rheumatologist and will discuss this further.  She otherwise is feeling well.  She also takes methotrexate.  Had biopsy of right ear.  She currently is applying betamethasone cream.  She found that a few days after the biopsy the ear lesion became more tender and swollen at the base.  She has an appoint with dermatology next month.        6/27/2024    10:46 AM   Additional Questions   Roomed by TAHMINA Lucas   Accompanied by MARIALUISA         Health Care Directive  Patient has a Health Care Directive on file  Advance care planning document is on file and is current.        6/27/2024   General Health   How would you rate your overall physical health? (!) FAIR   Feel stress (tense, anxious, or unable to sleep) Only a little      (!) STRESS CONCERN      6/27/2024   Nutrition   Diet: Regular (no restrictions)            6/27/2024   Exercise   Days per week of moderate/strenous exercise 0 days      (!) EXERCISE CONCERN      6/27/2024   Social Factors   Frequency of gathering with friends or relatives Once a week   Worry food won't last until get money to buy more No   Food not last or not have enough money for food? No   Do you have housing? (Housing is  defined as stable permanent housing and does not include staying ouside in a car, in a tent, in an abandoned building, in an overnight shelter, or couch-surfing.) Yes   Are you worried about losing your housing? No   Lack of transportation? No   Unable to get utilities (heat,electricity)? No            6/27/2024   Fall Risk   Fallen 2 or more times in the past year? No    No   Trouble with walking or balance? Yes    Yes   Gait Speed Test Interpretation Less than or equal to 5.00 seconds - PASS       Multiple values from one day are sorted in reverse-chronological order           6/27/2024   Activities of Daily Living- Home Safety   Needs help with the following daily activites None of the above   Safety concerns in the home None of the above            6/27/2024   Dental   Dentist two times every year? (!) NO            6/27/2024   Hearing Screening   Hearing concerns? None of the above            6/27/2024   Driving Risk Screening   Patient/family members have concerns about driving No            6/27/2024   General Alertness/Fatigue Screening   Have you been more tired than usual lately? No            6/27/2024   Urinary Incontinence Screening   Bothered by leaking urine in past 6 months No            6/27/2024   TB Screening   Were you born outside of the [place]? No            Today's PHQ-2 Score:       6/27/2024    10:38 AM   PHQ-2 ( 1999 [employer])   Q1: Little interest or pleasure in doing things 0   Q2: Feeling down, depressed or hopeless 0   PHQ-2 Score 0   Q1: Little interest or pleasure in doing things Not at all   Q2: Feeling down, depressed or hopeless Not at all   PHQ-2 Score 0           6/27/2024   Substance Use   Alcohol more than 3/day or more than 7/wk No   Do you have a current opioid prescription? No   How severe/bad is pain from 1 to 10? 4/10   Do you use any other substances recreationally? (!) OTHER        Social History     Tobacco Use    Smoking status: Never     Passive exposure: Never    Smokeless  tobacco: Never    Tobacco comments:     no ecig   Vaping Use    Vaping status: Never Used   Substance Use Topics    Alcohol use: No     Alcohol/week: 0.0 standard drinks of alcohol    Drug use: No           3/13/2024   LAST FHS-7 RESULTS   1st degree relative breast or ovarian cancer Yes   Any relative bilateral breast cancer No   Any male have breast cancer No   Any ONE woman have BOTH breast AND ovarian cancer No   Any woman with breast cancer before 50yrs No   2 or more relatives with breast AND/OR ovarian cancer No   2 or more relatives with breast AND/OR bowel cancer Yes           Mammogram Screening - After age 74- determine frequency with patient based on health status, life expectancy and patient goals          Reviewed and updated as needed this visit by Provider   Tobacco  Allergies  Meds  Problems  Med Hx  Surg Hx  Fam Hx     Sexual Activity            Current providers sharing in care for this patient include:  Patient Care Team:  Kaia Quinn NP as PCP - General (Nurse Practitioner)  Betina Cifuentes MD as Referring Physician (Surgery)  Rolando Moreno MD as Assigned Musculoskeletal Provider  Domenica Wayne APRN CNP as Assigned Heart and Vascular Provider  Kimberly Mullins APRN CNP as Assigned Cancer Care Provider  Kaia Quinn NP as Assigned PCP    The following health maintenance items are reviewed in Epic and correct as of today:  Health Maintenance   Topic Date Due    LIPID  06/27/2024 (Originally 1/11/2023)    BMP  05/07/2025    HEMOGLOBIN  05/07/2025    MEDICARE ANNUAL WELLNESS VISIT  06/27/2025    FALL RISK ASSESSMENT  06/27/2025    ADVANCE CARE PLANNING  06/27/2029    DEXA  06/08/2037    PHQ-2 (once per calendar year)  Completed    Pneumococcal Vaccine: 65+ Years  Completed    URINALYSIS  Completed    IPV IMMUNIZATION  Aged Out    HPV IMMUNIZATION  Aged Out    MENINGITIS IMMUNIZATION  Aged Out    RSV MONOCLONAL ANTIBODY  Aged Out    MICROALBUMIN  Discontinued  "   INFLUENZA VACCINE  Discontinued    ZOSTER IMMUNIZATION  Discontinued    DTAP/TDAP/TD IMMUNIZATION  Discontinued    RSV VACCINE (Pregnancy & 60+)  Discontinued    COVID-19 Vaccine  Discontinued       Review of systems:  See HPI otherwise negative.  12 point review of systems covered with patient.       Objective    Exam  /82 (BP Location: Right arm)   Pulse 76   Temp 97.1  F (36.2  C) (Temporal)   Resp 14   Ht 1.638 m (5' 4.5\")   Wt 69.2 kg (152 lb 9.6 oz)   LMP 10/11/2003 (Approximate)   SpO2 95%   Breastfeeding No   BMI 25.79 kg/m     Estimated body mass index is 25.79 kg/m  as calculated from the following:    Height as of this encounter: 1.638 m (5' 4.5\").    Weight as of this encounter: 69.2 kg (152 lb 9.6 oz).  Pleasant female no acute distress.  Affect normal.  Alert and oriented x 4.  Skin color pink.  Sclera nonicteric.  Conjunctiva noninflamed.  Mucous remains moist.  TMs clear.  Right ear helix with open sore with pink wound base and mild swelling at the distal border.  No drainage.  Mild erythema surrounding.  Tender with palpation.  Neck supple without adenopathy.  No thyromegaly.  Lung fields clear to auscultation throughout.  Cardiovascular regular rate and rhythm with no murmur, S3 or S4.  Abdomen is soft and nontender.  No abdominal bruits or pulsatile masses.  No axillary or inguinal adenopathy.  Extremities without edema.  DP PT palpable.  Gait stable.    RN wellness visit information reviewed and discussed with patient.        6/27/2024   Mini Cog   Clock Draw Score 2 Normal   3 Item Recall 3 objects recalled   Mini Cog Total Score 5             Signed Electronically by: Kaia Quinn NP    Answers submitted by the patient for this visit:  Lipid Visit (Submitted on 6/27/2024)  Chief Complaint: Chronic problems general questions HPI Form  Are you regularly taking any medication or supplement to lower your cholesterol?: No  Are you having muscle aches or other side effects " that you think could be caused by your cholesterol lowering medication?: No

## 2024-07-10 ENCOUNTER — HOSPITAL ENCOUNTER (OUTPATIENT)
Dept: BONE DENSITY | Facility: OTHER | Age: 84
Discharge: HOME | End: 2024-07-10
Attending: PHYSICIAN ASSISTANT | Admitting: PHYSICIAN ASSISTANT
Payer: MEDICARE

## 2024-07-10 DIAGNOSIS — M81.0 AGE-RELATED OSTEOPOROSIS WITHOUT CURRENT PATHOLOGICAL FRACTURE: ICD-10-CM

## 2024-07-10 PROCEDURE — 77080 DXA BONE DENSITY AXIAL: CPT

## 2024-07-17 ENCOUNTER — LAB (OUTPATIENT)
Dept: LAB | Facility: OTHER | Age: 84
End: 2024-07-17
Attending: NURSE PRACTITIONER
Payer: MEDICARE

## 2024-07-17 DIAGNOSIS — D05.11 DUCTAL CARCINOMA IN SITU (DCIS) OF RIGHT BREAST: ICD-10-CM

## 2024-07-17 DIAGNOSIS — C50.111 MALIGNANT NEOPLASM OF CENTRAL PORTION OF RIGHT BREAST IN FEMALE, ESTROGEN RECEPTOR POSITIVE (H): ICD-10-CM

## 2024-07-17 DIAGNOSIS — M05.79 SEROPOSITIVE RHEUMATOID ARTHRITIS OF MULTIPLE SITES (H): ICD-10-CM

## 2024-07-17 DIAGNOSIS — Z79.899 ENCOUNTER FOR LONG-TERM (CURRENT) USE OF HIGH-RISK MEDICATION: ICD-10-CM

## 2024-07-17 DIAGNOSIS — Z17.0 MALIGNANT NEOPLASM OF CENTRAL PORTION OF RIGHT BREAST IN FEMALE, ESTROGEN RECEPTOR POSITIVE (H): ICD-10-CM

## 2024-07-17 LAB
ALBUMIN SERPL BCG-MCNC: 4.5 G/DL (ref 3.5–5.2)
ALP SERPL-CCNC: 82 U/L (ref 40–150)
ALT SERPL W P-5'-P-CCNC: 18 U/L (ref 0–50)
ANION GAP SERPL CALCULATED.3IONS-SCNC: 7 MMOL/L (ref 7–15)
AST SERPL W P-5'-P-CCNC: 25 U/L (ref 0–45)
BASOPHILS # BLD AUTO: 0.1 10E3/UL (ref 0–0.2)
BASOPHILS NFR BLD AUTO: 2 %
BILIRUB SERPL-MCNC: 0.3 MG/DL
BUN SERPL-MCNC: 19.3 MG/DL (ref 8–23)
CALCIUM SERPL-MCNC: 10.6 MG/DL (ref 8.8–10.4)
CHLORIDE SERPL-SCNC: 101 MMOL/L (ref 98–107)
CREAT SERPL-MCNC: 1.03 MG/DL (ref 0.51–0.95)
CRP SERPL-MCNC: <3 MG/L
EGFRCR SERPLBLD CKD-EPI 2021: 53 ML/MIN/1.73M2
EOSINOPHIL # BLD AUTO: 0.2 10E3/UL (ref 0–0.7)
EOSINOPHIL NFR BLD AUTO: 4 %
ERYTHROCYTE [DISTWIDTH] IN BLOOD BY AUTOMATED COUNT: 14.2 % (ref 10–15)
ERYTHROCYTE [SEDIMENTATION RATE] IN BLOOD BY WESTERGREN METHOD: 6 MM/HR (ref 0–30)
GLUCOSE SERPL-MCNC: 97 MG/DL (ref 70–99)
HCO3 SERPL-SCNC: 29 MMOL/L (ref 22–29)
HCT VFR BLD AUTO: 39.3 % (ref 35–47)
HGB BLD-MCNC: 12.7 G/DL (ref 11.7–15.7)
IMM GRANULOCYTES # BLD: 0 10E3/UL
IMM GRANULOCYTES NFR BLD: 0 %
LDH SERPL L TO P-CCNC: 190 U/L (ref 0–250)
LYMPHOCYTES # BLD AUTO: 0.9 10E3/UL (ref 0.8–5.3)
LYMPHOCYTES NFR BLD AUTO: 19 %
MCH RBC QN AUTO: 32 PG (ref 26.5–33)
MCHC RBC AUTO-ENTMCNC: 32.3 G/DL (ref 31.5–36.5)
MCV RBC AUTO: 99 FL (ref 78–100)
MONOCYTES # BLD AUTO: 0.5 10E3/UL (ref 0–1.3)
MONOCYTES NFR BLD AUTO: 11 %
NEUTROPHILS # BLD AUTO: 2.9 10E3/UL (ref 1.6–8.3)
NEUTROPHILS NFR BLD AUTO: 64 %
NRBC # BLD AUTO: 0 10E3/UL
NRBC BLD AUTO-RTO: 0 /100
PLATELET # BLD AUTO: 225 10E3/UL (ref 150–450)
POTASSIUM SERPL-SCNC: 5 MMOL/L (ref 3.4–5.3)
PROT SERPL-MCNC: 7.2 G/DL (ref 6.4–8.3)
RBC # BLD AUTO: 3.97 10E6/UL (ref 3.8–5.2)
SODIUM SERPL-SCNC: 137 MMOL/L (ref 135–145)
WBC # BLD AUTO: 4.6 10E3/UL (ref 4–11)

## 2024-07-17 PROCEDURE — 86140 C-REACTIVE PROTEIN: CPT | Mod: ZL

## 2024-07-17 PROCEDURE — 85025 COMPLETE CBC W/AUTO DIFF WBC: CPT | Mod: ZL

## 2024-07-17 PROCEDURE — 83615 LACTATE (LD) (LDH) ENZYME: CPT | Mod: ZL

## 2024-07-17 PROCEDURE — 85652 RBC SED RATE AUTOMATED: CPT | Mod: ZL

## 2024-07-17 PROCEDURE — 80053 COMPREHEN METABOLIC PANEL: CPT | Mod: ZL

## 2024-07-17 PROCEDURE — 86300 IMMUNOASSAY TUMOR CA 15-3: CPT | Mod: ZL

## 2024-07-17 PROCEDURE — 36415 COLL VENOUS BLD VENIPUNCTURE: CPT | Mod: ZL

## 2024-07-18 LAB — CANCER AG15-3 SERPL-ACNC: 13 U/ML

## 2024-07-25 ENCOUNTER — ONCOLOGY VISIT (OUTPATIENT)
Dept: ONCOLOGY | Facility: OTHER | Age: 84
End: 2024-07-25
Attending: NURSE PRACTITIONER
Payer: COMMERCIAL

## 2024-07-25 VITALS
HEART RATE: 77 BPM | DIASTOLIC BLOOD PRESSURE: 71 MMHG | BODY MASS INDEX: 26.36 KG/M2 | WEIGHT: 156 LBS | TEMPERATURE: 98.4 F | SYSTOLIC BLOOD PRESSURE: 112 MMHG | OXYGEN SATURATION: 95 % | RESPIRATION RATE: 20 BRPM

## 2024-07-25 DIAGNOSIS — D05.11 DUCTAL CARCINOMA IN SITU (DCIS) OF RIGHT BREAST: Primary | ICD-10-CM

## 2024-07-25 DIAGNOSIS — Z17.0 MALIGNANT NEOPLASM OF CENTRAL PORTION OF RIGHT BREAST IN FEMALE, ESTROGEN RECEPTOR POSITIVE (H): ICD-10-CM

## 2024-07-25 DIAGNOSIS — C50.111 MALIGNANT NEOPLASM OF CENTRAL PORTION OF RIGHT BREAST IN FEMALE, ESTROGEN RECEPTOR POSITIVE (H): ICD-10-CM

## 2024-07-25 PROCEDURE — G0463 HOSPITAL OUTPT CLINIC VISIT: HCPCS

## 2024-07-25 PROCEDURE — 99214 OFFICE O/P EST MOD 30 MIN: CPT | Performed by: NURSE PRACTITIONER

## 2024-07-25 RX ORDER — MUPIROCIN 20 MG/G
OINTMENT TOPICAL 2 TIMES DAILY PRN
COMMUNITY
Start: 2024-07-16

## 2024-07-25 RX ORDER — METHOTREXATE 25 MG/ML
25 INJECTION, SOLUTION INTRA-ARTERIAL; INTRAMUSCULAR; INTRAVENOUS
COMMUNITY
Start: 2024-06-06

## 2024-07-25 ASSESSMENT — PAIN SCALES - GENERAL: PAINLEVEL: NO PAIN (0)

## 2024-07-25 NOTE — PROGRESS NOTES
Oncology Follow-up Visit:  July 25, 2024  Diagnosis:DCIS    History Of Present Illness:  Patient presents for followup of DCIS.  Patient was seen in consultation at the request of Dr. Cifuentes and Hillary Quinn, nurse practitioner, on 05/11/2022 to evaluate concerning diagnosis of DCIS of the right breast.  Apparently, she had undergone routine mammography and was found to have an abnormal mammogram with microcalcification in the right breast.  The patient underwent stereotactic-guided biopsy and was found to have DCIS, grade 2, strongly ER positive, MD positive.  HER-2/tommy was not tested.  She was ultimately seen by Dr. Betina Cifuentes who proceeded with right breast lumpectomy performed on 03/30/2022 and the findings were that the patient had DCIS, nuclear grade 2, cribriform type.  Size was 1.9 cm.  Margins were negative.  Estrogen receptor was positive at % and was negative for invasive malignancy. When we saw the patient, we recommended adjuvant radiation therapy.  The patient refused. It was felt given her ER positivity that she would be a candidate for aromatase inhibitor therapy, which has shown to reduce development of invasive breast cancer in the ipsilateral breast as well as contralateral breast.  PET scan was done on 06/08/2022 and the findings were there were small pulmonary nodules up to 4 mm. There was no previous CT to compare this.  They were not hypermetabolic.  It was recommended she repeat CT chest in 6 months. The rest of the PET scan was essentially negative for hypermetabolism.  She also had a bone density scan on 06/08/2022.  It was read as osteopenia with the lowest T-score being -2.3 in the right femoral neck. FRAX score for major osteoporotic fracture was 22.7%.  FRAX score for excoriated fracture was 8.5%.  When patient was seen on 06/15/2020, she had read up on aromatase inhibitor, Arimidex, and did not want to deal with the side effects, did not want to have hot flashes or  osteoporosis. We elected to repeat the CT chest to assess pulmonary nodules and this was done on 12/19 and essentially the  lung nodules were stable and were all less than 4 mm and felt to be benign.  The patient is a nonsmoker, so therefore, these are likely benign nodules as she had never smoked in her life or had been exposed to secondhand smoke. Patient continued to refuse aromatase inhibitor. CT chest from 8/2/23 was stable. A mammogram from 3/13/24 was negative for malignancy. Labs done on 7/17/24 show a normal tumor marker. Calcium is slightly elevated at 10.6. All other labs are stable. Patient denies any new breast changes. She has no new concerns at this time.     Review Of Systems:  Review Of Systems  Eyes/Ears/Nose/Throat: denies new vision changes  Respiratory: No shortness of breath, dyspnea on exertion, cough  Cardiovascular: denies chest pain   Gastrointestinal: reports occasional constipation, denies abdominal pain  Genitourinary: denies dysuria or hematuria  Musculoskeletal: reports bilateral knee pain, occasional low back pain  Neurologic: reports occasional headaches, denies dizziness  Hematologic/Lymphatic/Immunologic: denies fevers, no recent illness      There are no exam notes on file for this visit.    Past medical, social, surgical, and family histories reviewed.    Allergies:  Allergies as of 07/25/2024 - Reviewed 07/25/2024   Allergen Reaction Noted    Codeine Nausea 11/13/2012    Excedrin back & [acetaminophen-aspirin buffered] Other (See Comments) 03/12/2021    Amoxicillin-pot clavulanate  03/20/2020    Atorvastatin Muscle Pain (Myalgia) 04/20/2021    Crestor [rosuvastatin]  03/08/2022    Simvastatin  03/08/2022    Sulfa antibiotics  10/28/2019    Tramadol Other (See Comments) 03/30/2022       Current Medications:  Current Outpatient Medications   Medication Sig Dispense Refill    methotrexate 50 MG/2ML injection Inject 25 mg subcutaneously every 7 days      mupirocin (BACTROBAN) 2 %  external ointment Apply topically 2 times daily as needed (Flares to right ear)      acetaminophen (TYLENOL) 325 MG tablet Take 325 mg by mouth every 6 hours as needed for mild pain UNKNOWN DOSE      ascorbic acid (VITAMIN C) 1000 MG TABS Take 1,000 mg by mouth daily  30 tablet     augmented betamethasone dipropionate (DIPROLENE AF) 0.05 % external cream Apply topically 2 times daily      CALCIUM CARBONATE-VIT D-MIN PO Take 1 capsule by mouth 2 times daily       cycloSPORINE (RESTASIS) 0.05 % ophthalmic emulsion Place 1 drop into both eyes 2 times daily       diclofenac (VOLTAREN) 1 % topical gel Apply 2 g topically 4 times daily Apply to joints as needed. 50 g 3    Flaxseed Oil OIL Take 1 capsule by mouth daily       folic acid (FOLVITE) 1 MG tablet Take 2 mg by mouth daily       Ginger, Zingiber officinalis, (GINGER EXTRACT) 250 MG CAPS Take 1 capsule by mouth daily 120 capsule     hydroxychloroquine (PLAQUENIL) 200 MG tablet Take 1 tablet (200 mg) by mouth every other day      Lactobacillus (PROBIOTIC ACIDOPHILUS PO) Take 1 capsule by mouth as needed       Magnesium 300 MG CAPS Take 1 capsule by mouth daily      metroNIDAZOLE (METROGEL) 0.75 % external gel Apply topically daily 45 g 3    Multiple Vitamin (MULTIVITAMIN ADULT PO) Take 1 tablet by mouth daily      Omega-3 Fatty Acids (SALMON OIL-1000 PO) Take 1 capsule by mouth 2 times daily       triamcinolone (ARISTOCORT HP) 0.5 % external cream Apply sparingly to affected area two times daily. 30 g 3    Turmeric Curcumin 500 MG CAPS Take 500 mg by mouth daily           Physical Exam:  /71   Pulse 77   Temp 98.4  F (36.9  C) (Tympanic)   Resp 20   Wt 70.8 kg (156 lb)   LMP 10/11/2003 (Approximate)   SpO2 95%   BMI 26.36 kg/m      GENERAL APPEARANCE: 84 year old female, alert and no distress     NECK: no adenopathy, no asymmetry or masses     LYMPHATICS: No cervical, supraclavicular, axillary lymphadenopathy     RESP: lungs clear to auscultation - no  rales, rhonchi or wheezes     CARDIOVASCULAR: regular rates and rhythm, normal S1 S2      BREASTS: exam performed. No discrete mass noted in either breast     ABDOMEN:  soft, nontender, bowel sounds normal     MUSCULOSKELETAL: extremities normal- no gross deformities noted, No edema b/l LE.     SKIN: no suspicious lesions or rashes on exposed skin     PSYCHIATRIC: mentation appears normal and affect normal    Laboratory/Imaging Studies  Lab on 07/17/2024   Component Date Value Ref Range Status    Sodium 07/17/2024 137  135 - 145 mmol/L Final    Potassium 07/17/2024 5.0  3.4 - 5.3 mmol/L Final    Carbon Dioxide (CO2) 07/17/2024 29  22 - 29 mmol/L Final    Anion Gap 07/17/2024 7  7 - 15 mmol/L Final    Urea Nitrogen 07/17/2024 19.3  8.0 - 23.0 mg/dL Final    Creatinine 07/17/2024 1.03 (H)  0.51 - 0.95 mg/dL Final    GFR Estimate 07/17/2024 53 (L)  >60 mL/min/1.73m2 Final    eGFR calculated using 2021 CKD-EPI equation.    Calcium 07/17/2024 10.6 (H)  8.8 - 10.4 mg/dL Final    Reference intervals for this test were updated on 7/16/2024 to reflect our healthy population more accurately. There may be differences in the flagging of prior results with similar values performed with this method. Those prior results can be interpreted in the context of the updated reference intervals.    Chloride 07/17/2024 101  98 - 107 mmol/L Final    Glucose 07/17/2024 97  70 - 99 mg/dL Final    Alkaline Phosphatase 07/17/2024 82  40 - 150 U/L Final    AST 07/17/2024 25  0 - 45 U/L Final    ALT 07/17/2024 18  0 - 50 U/L Final    Protein Total 07/17/2024 7.2  6.4 - 8.3 g/dL Final    Albumin 07/17/2024 4.5  3.5 - 5.2 g/dL Final    Bilirubin Total 07/17/2024 0.3  <=1.2 mg/dL Final    CRP Inflammation 07/17/2024 <3.00  <5.00 mg/L Final    Erythrocyte Sedimentation Rate 07/17/2024 6  0 - 30 mm/hr Final    Lactate Dehydrogenase 07/17/2024 190  0 - 250 U/L Final    Cancer Antigen 15-3 07/17/2024 13  <=25 U/mL Final    This result is obtained using  the Roche Elecsys CA 15-3 II method on the jorge [id number] immunoassay analyzer. Results obtained with different assay methods or kits cannot be used interchangeably.    WBC Count 07/17/2024 4.6  4.0 - 11.0 10e3/uL Final    RBC Count 07/17/2024 3.97  3.80 - 5.20 10e6/uL Final    Hemoglobin 07/17/2024 12.7  11.7 - 15.7 g/dL Final    Hematocrit 07/17/2024 39.3  35.0 - 47.0 % Final    MCV 07/17/2024 99  78 - 100 fL Final    MCH 07/17/2024 32.0  26.5 - 33.0 pg Final    MCHC 07/17/2024 32.3  31.5 - 36.5 g/dL Final    RDW 07/17/2024 14.2  10.0 - 15.0 % Final    Platelet Count 07/17/2024 225  150 - 450 10e3/uL Final    % Neutrophils 07/17/2024 64  % Final    % Lymphocytes 07/17/2024 19  % Final    % Monocytes 07/17/2024 11  % Final    % Eosinophils 07/17/2024 4  % Final    % Basophils 07/17/2024 2  % Final    % Immature Granulocytes 07/17/2024 0  % Final    NRBCs per 100 WBC 07/17/2024 0  <1 /100 Final    Absolute Neutrophils 07/17/2024 2.9  1.6 - 8.3 10e3/uL Final    Absolute Lymphocytes 07/17/2024 0.9  0.8 - 5.3 10e3/uL Final    Absolute Monocytes 07/17/2024 0.5  0.0 - 1.3 10e3/uL Final    Absolute Eosinophils 07/17/2024 0.2  0.0 - 0.7 10e3/uL Final    Absolute Basophils 07/17/2024 0.1  0.0 - 0.2 10e3/uL Final    Absolute Immature Granulocytes 07/17/2024 0.0  <=0.4 10e3/uL Final    Absolute NRBCs 07/17/2024 0.0  10e3/uL Final        ASSESSMENT/PLAN:  1. DCIS. Stage 0 malignant neoplasm of the central portion of the right breast, consistent with grade 2 DCIS 1.9 cm mass, status post lumpectomy.  The patient refused adjuvant radiation therapy.  PET scan was essentially negative except for multiple nonspecific pulmonary nodules that were not hypermetabolic.  We offered the patient aromatase inhibitor; she refused.  Patient is aware of the risks of not taking aromatase inhibitor. A mammogram from 3/13/24 was negative for malignancy. Labs done on 7/17/24 show a normal tumor marker. Calcium is slightly elevated at 10.6. All other  labs are stable. We discussed decreasing calcium to once daily.  Will see patient in 6 months with repeat labs. Bilateral mammogram is due in March 2025, order placed.      2.  Pulmonary nodules.  Repeat CT chest was stable.  It was felt that these are benign nodules.  No need for further workup per Dr Justin.     Thirty three minutes spent with this encounter with time spent reviewing patient records, counseling patient regarding disease process, interpretation and review of labs with patient, discussing plan for ongoing follow up, obtaining a review of systems, performing a physical exam including a clinical breast exam, ordering tests, documenting in EHR and coordination of care

## 2024-07-25 NOTE — NURSING NOTE
"Oncology Rooming Note    July 25, 2024 9:08 AM   Verena Wise is a 84 year old female who presents for:    Chief Complaint   Patient presents with    Oncology Clinic Visit     F/U Breast cancer     Initial Vitals: /71   Pulse 77   Temp 98.4  F (36.9  C) (Tympanic)   Resp 20   Wt 70.8 kg (156 lb)   LMP 10/11/2003 (Approximate)   SpO2 95%   BMI 26.36 kg/m   Estimated body mass index is 26.36 kg/m  as calculated from the following:    Height as of 6/27/24: 1.638 m (5' 4.5\").    Weight as of this encounter: 70.8 kg (156 lb). Body surface area is 1.79 meters squared.  No Pain (0) Comment: Data Unavailable   Patient's last menstrual period was 10/11/2003 (approximate).  Allergies reviewed: Yes  Medications reviewed: Yes    Medications: Medication refills not needed today.  Pharmacy name entered into [employer]: [facility] [address] [place] [address]    Frailty Screening:   Is the patient here for a new oncology consult visit in cancer care? 2. No      Clinical concerns: None       Suni Lozoya CMA (AAMA)  "

## 2024-10-15 ENCOUNTER — LAB (OUTPATIENT)
Dept: LAB | Facility: OTHER | Age: 84
End: 2024-10-15
Attending: INTERNAL MEDICINE
Payer: MEDICARE

## 2024-10-15 DIAGNOSIS — M05.79 SEROPOSITIVE RHEUMATOID ARTHRITIS OF MULTIPLE SITES (H): ICD-10-CM

## 2024-10-15 DIAGNOSIS — Z79.899 ENCOUNTER FOR LONG-TERM (CURRENT) USE OF MEDICATIONS: ICD-10-CM

## 2024-10-15 LAB
ALBUMIN SERPL BCG-MCNC: 4.4 G/DL (ref 3.5–5.2)
ALP SERPL-CCNC: 79 U/L (ref 40–150)
ALT SERPL W P-5'-P-CCNC: 19 U/L (ref 0–50)
ANION GAP SERPL CALCULATED.3IONS-SCNC: 8 MMOL/L (ref 7–15)
AST SERPL W P-5'-P-CCNC: 24 U/L (ref 0–45)
BASOPHILS # BLD AUTO: 0.1 10E3/UL (ref 0–0.2)
BASOPHILS NFR BLD AUTO: 2 %
BILIRUB SERPL-MCNC: 0.4 MG/DL
BUN SERPL-MCNC: 17.1 MG/DL (ref 8–23)
CALCIUM SERPL-MCNC: 10.5 MG/DL (ref 8.8–10.4)
CHLORIDE SERPL-SCNC: 102 MMOL/L (ref 98–107)
CREAT SERPL-MCNC: 1.04 MG/DL (ref 0.51–0.95)
CRP SERPL-MCNC: <3 MG/L
EGFRCR SERPLBLD CKD-EPI 2021: 53 ML/MIN/1.73M2
EOSINOPHIL # BLD AUTO: 0.2 10E3/UL (ref 0–0.7)
EOSINOPHIL NFR BLD AUTO: 3 %
ERYTHROCYTE [DISTWIDTH] IN BLOOD BY AUTOMATED COUNT: 13.7 % (ref 10–15)
ERYTHROCYTE [SEDIMENTATION RATE] IN BLOOD BY WESTERGREN METHOD: 12 MM/HR (ref 0–30)
GLUCOSE SERPL-MCNC: 82 MG/DL (ref 70–99)
HCO3 SERPL-SCNC: 30 MMOL/L (ref 22–29)
HCT VFR BLD AUTO: 40.7 % (ref 35–47)
HGB BLD-MCNC: 13.3 G/DL (ref 11.7–15.7)
IMM GRANULOCYTES # BLD: 0 10E3/UL
IMM GRANULOCYTES NFR BLD: 0 %
LYMPHOCYTES # BLD AUTO: 0.9 10E3/UL (ref 0.8–5.3)
LYMPHOCYTES NFR BLD AUTO: 19 %
MCH RBC QN AUTO: 31.9 PG (ref 26.5–33)
MCHC RBC AUTO-ENTMCNC: 32.7 G/DL (ref 31.5–36.5)
MCV RBC AUTO: 98 FL (ref 78–100)
MONOCYTES # BLD AUTO: 0.5 10E3/UL (ref 0–1.3)
MONOCYTES NFR BLD AUTO: 10 %
NEUTROPHILS # BLD AUTO: 3.2 10E3/UL (ref 1.6–8.3)
NEUTROPHILS NFR BLD AUTO: 66 %
NRBC # BLD AUTO: 0 10E3/UL
NRBC BLD AUTO-RTO: 0 /100
PLATELET # BLD AUTO: 261 10E3/UL (ref 150–450)
POTASSIUM SERPL-SCNC: 4.4 MMOL/L (ref 3.4–5.3)
PROT SERPL-MCNC: 7.4 G/DL (ref 6.4–8.3)
RBC # BLD AUTO: 4.17 10E6/UL (ref 3.8–5.2)
SODIUM SERPL-SCNC: 140 MMOL/L (ref 135–145)
WBC # BLD AUTO: 4.8 10E3/UL (ref 4–11)

## 2024-10-15 PROCEDURE — 80053 COMPREHEN METABOLIC PANEL: CPT | Mod: ZL

## 2024-10-15 PROCEDURE — 85004 AUTOMATED DIFF WBC COUNT: CPT | Mod: ZL

## 2024-10-15 PROCEDURE — 36415 COLL VENOUS BLD VENIPUNCTURE: CPT | Mod: ZL

## 2024-10-15 PROCEDURE — 85652 RBC SED RATE AUTOMATED: CPT | Mod: ZL

## 2024-10-15 PROCEDURE — 86140 C-REACTIVE PROTEIN: CPT | Mod: ZL

## 2024-10-18 ENCOUNTER — LAB (OUTPATIENT)
Dept: LAB | Facility: OTHER | Age: 84
End: 2024-10-18
Payer: MEDICARE

## 2024-10-18 DIAGNOSIS — E55.9 VITAMIN D DEFICIENCY, UNSPECIFIED: ICD-10-CM

## 2024-10-18 DIAGNOSIS — M89.9 DISORDER OF BONE, UNSPECIFIED: ICD-10-CM

## 2024-10-18 DIAGNOSIS — M81.0 AGE-RELATED OSTEOPOROSIS WITHOUT CURRENT PATHOLOGICAL FRACTURE: ICD-10-CM

## 2024-10-18 LAB — MAGNESIUM SERPL-MCNC: 2.3 MG/DL (ref 1.7–2.3)

## 2024-10-18 PROCEDURE — 82330 ASSAY OF CALCIUM: CPT | Mod: ZL

## 2024-10-18 PROCEDURE — 83521 IG LIGHT CHAINS FREE EACH: CPT | Mod: ZL

## 2024-10-18 PROCEDURE — 82306 VITAMIN D 25 HYDROXY: CPT | Mod: ZL

## 2024-10-18 PROCEDURE — 36415 COLL VENOUS BLD VENIPUNCTURE: CPT | Mod: ZL

## 2024-10-18 PROCEDURE — 84080 ASSAY ALKALINE PHOSPHATASES: CPT | Mod: ZL

## 2024-10-18 PROCEDURE — 83735 ASSAY OF MAGNESIUM: CPT | Mod: ZL

## 2024-10-18 PROCEDURE — 83970 ASSAY OF PARATHORMONE: CPT | Mod: ZL

## 2024-10-19 LAB
CA-I BLD-MCNC: 5.5 MG/DL (ref 4.4–5.2)
PTH-INTACT SERPL-MCNC: 44 PG/ML (ref 15–65)
VIT D+METAB SERPL-MCNC: 54 NG/ML (ref 20–50)

## 2024-10-20 LAB — ALP BONE SERPL-MCNC: 13.6 UG/L

## 2024-10-21 LAB
KAPPA LC FREE SER-MCNC: 1.83 MG/DL (ref 0.33–1.94)
KAPPA LC FREE/LAMBDA FREE SER NEPH: 1.27 {RATIO} (ref 0.26–1.65)
LAMBDA LC FREE SERPL-MCNC: 1.44 MG/DL (ref 0.57–2.63)

## 2024-10-23 ENCOUNTER — TRANSFERRED RECORDS (OUTPATIENT)
Dept: HEALTH INFORMATION MANAGEMENT | Facility: OTHER | Age: 84
End: 2024-10-23
Payer: COMMERCIAL

## 2025-01-22 ENCOUNTER — LAB (OUTPATIENT)
Dept: LAB | Facility: OTHER | Age: 85
End: 2025-01-22
Attending: NURSE PRACTITIONER
Payer: MEDICARE

## 2025-01-22 DIAGNOSIS — Z79.899 ENCOUNTER FOR LONG-TERM (CURRENT) USE OF MEDICATIONS: ICD-10-CM

## 2025-01-22 DIAGNOSIS — Z17.0 MALIGNANT NEOPLASM OF CENTRAL PORTION OF RIGHT BREAST IN FEMALE, ESTROGEN RECEPTOR POSITIVE (H): ICD-10-CM

## 2025-01-22 DIAGNOSIS — D05.11 DUCTAL CARCINOMA IN SITU (DCIS) OF RIGHT BREAST: ICD-10-CM

## 2025-01-22 DIAGNOSIS — C50.111 MALIGNANT NEOPLASM OF CENTRAL PORTION OF RIGHT BREAST IN FEMALE, ESTROGEN RECEPTOR POSITIVE (H): ICD-10-CM

## 2025-01-22 DIAGNOSIS — M05.79 SEROPOSITIVE RHEUMATOID ARTHRITIS OF MULTIPLE SITES (H): ICD-10-CM

## 2025-01-22 LAB
ALBUMIN SERPL BCG-MCNC: 4.3 G/DL (ref 3.5–5.2)
ALP SERPL-CCNC: 81 U/L (ref 40–150)
ALT SERPL W P-5'-P-CCNC: 16 U/L (ref 0–50)
ANION GAP SERPL CALCULATED.3IONS-SCNC: 9 MMOL/L (ref 7–15)
AST SERPL W P-5'-P-CCNC: 23 U/L (ref 0–45)
BASOPHILS # BLD AUTO: 0.1 10E3/UL (ref 0–0.2)
BASOPHILS NFR BLD AUTO: 2 %
BILIRUB SERPL-MCNC: 0.4 MG/DL
BUN SERPL-MCNC: 18.5 MG/DL (ref 8–23)
CALCIUM SERPL-MCNC: 10.1 MG/DL (ref 8.8–10.4)
CHLORIDE SERPL-SCNC: 103 MMOL/L (ref 98–107)
CREAT SERPL-MCNC: 1.07 MG/DL (ref 0.51–0.95)
CRP SERPL-MCNC: <3 MG/L
EGFRCR SERPLBLD CKD-EPI 2021: 51 ML/MIN/1.73M2
EOSINOPHIL # BLD AUTO: 0.2 10E3/UL (ref 0–0.7)
EOSINOPHIL NFR BLD AUTO: 3 %
ERYTHROCYTE [DISTWIDTH] IN BLOOD BY AUTOMATED COUNT: 14.2 % (ref 10–15)
ERYTHROCYTE [SEDIMENTATION RATE] IN BLOOD BY WESTERGREN METHOD: 5 MM/HR (ref 0–30)
GLUCOSE SERPL-MCNC: 85 MG/DL (ref 70–99)
HCO3 SERPL-SCNC: 28 MMOL/L (ref 22–29)
HCT VFR BLD AUTO: 39.6 % (ref 35–47)
HGB BLD-MCNC: 13.1 G/DL (ref 11.7–15.7)
IMM GRANULOCYTES # BLD: 0 10E3/UL
IMM GRANULOCYTES NFR BLD: 0 %
LDH SERPL L TO P-CCNC: 168 U/L (ref 0–250)
LYMPHOCYTES # BLD AUTO: 0.9 10E3/UL (ref 0.8–5.3)
LYMPHOCYTES NFR BLD AUTO: 18 %
MCH RBC QN AUTO: 32.4 PG (ref 26.5–33)
MCHC RBC AUTO-ENTMCNC: 33.1 G/DL (ref 31.5–36.5)
MCV RBC AUTO: 98 FL (ref 78–100)
MONOCYTES # BLD AUTO: 0.6 10E3/UL (ref 0–1.3)
MONOCYTES NFR BLD AUTO: 12 %
NEUTROPHILS # BLD AUTO: 3.1 10E3/UL (ref 1.6–8.3)
NEUTROPHILS NFR BLD AUTO: 65 %
NRBC # BLD AUTO: 0 10E3/UL
NRBC BLD AUTO-RTO: 0 /100
PLATELET # BLD AUTO: 251 10E3/UL (ref 150–450)
POTASSIUM SERPL-SCNC: 4.6 MMOL/L (ref 3.4–5.3)
PROT SERPL-MCNC: 7 G/DL (ref 6.4–8.3)
RBC # BLD AUTO: 4.04 10E6/UL (ref 3.8–5.2)
SODIUM SERPL-SCNC: 140 MMOL/L (ref 135–145)
WBC # BLD AUTO: 4.7 10E3/UL (ref 4–11)

## 2025-01-22 PROCEDURE — 85004 AUTOMATED DIFF WBC COUNT: CPT | Mod: ZL

## 2025-01-22 PROCEDURE — 86300 IMMUNOASSAY TUMOR CA 15-3: CPT | Mod: ZL

## 2025-01-22 PROCEDURE — 85014 HEMATOCRIT: CPT | Mod: ZL

## 2025-01-22 PROCEDURE — 82040 ASSAY OF SERUM ALBUMIN: CPT | Mod: ZL

## 2025-01-22 PROCEDURE — 86140 C-REACTIVE PROTEIN: CPT | Mod: ZL

## 2025-01-22 PROCEDURE — 85652 RBC SED RATE AUTOMATED: CPT | Mod: ZL

## 2025-01-22 PROCEDURE — 36415 COLL VENOUS BLD VENIPUNCTURE: CPT | Mod: ZL

## 2025-01-22 PROCEDURE — 83615 LACTATE (LD) (LDH) ENZYME: CPT | Mod: ZL

## 2025-01-22 PROCEDURE — 82435 ASSAY OF BLOOD CHLORIDE: CPT | Mod: ZL

## 2025-01-23 LAB — CANCER AG15-3 SERPL-ACNC: 12 U/ML

## 2025-01-29 ENCOUNTER — ONCOLOGY VISIT (OUTPATIENT)
Dept: ONCOLOGY | Facility: OTHER | Age: 85
End: 2025-01-29
Attending: NURSE PRACTITIONER
Payer: MEDICARE

## 2025-01-29 VITALS
RESPIRATION RATE: 16 BRPM | OXYGEN SATURATION: 95 % | DIASTOLIC BLOOD PRESSURE: 76 MMHG | HEART RATE: 81 BPM | WEIGHT: 158 LBS | TEMPERATURE: 98.2 F | BODY MASS INDEX: 26.7 KG/M2 | SYSTOLIC BLOOD PRESSURE: 128 MMHG

## 2025-01-29 DIAGNOSIS — Z12.31 ENCOUNTER FOR SCREENING MAMMOGRAM FOR MALIGNANT NEOPLASM OF BREAST: ICD-10-CM

## 2025-01-29 DIAGNOSIS — D05.11 DUCTAL CARCINOMA IN SITU (DCIS) OF RIGHT BREAST: Primary | ICD-10-CM

## 2025-01-29 ASSESSMENT — PAIN SCALES - GENERAL: PAINLEVEL_OUTOF10: NO PAIN (0)

## 2025-01-29 NOTE — NURSING NOTE
"Oncology Rooming Note    January 29, 2025 10:41 AM   Verena Wise is a 84 year old female who presents for:    Chief Complaint   Patient presents with    Oncology Clinic Visit     DCIS Rt Breast     Initial Vitals: /76 (BP Location: Right arm, Patient Position: Sitting, Cuff Size: Adult Regular)   Pulse 81   Temp 98.2  F (36.8  C) (Tympanic)   Resp 16   Wt 71.7 kg (158 lb)   LMP 10/11/2003 (Approximate)   SpO2 95%   BMI 26.70 kg/m   Estimated body mass index is 26.7 kg/m  as calculated from the following:    Height as of 6/27/24: 1.638 m (5' 4.5\").    Weight as of this encounter: 71.7 kg (158 lb). Body surface area is 1.81 meters squared.  No Pain (0) Comment: rating it 7/10 first in the morning or after sitting   Patient's last menstrual period was 10/11/2003 (approximate).  Allergies reviewed: Yes  Medications reviewed: Yes    Medications: Medication refills not needed today.  Pharmacy name entered into [facility]: [id number] - [place], [place] - [address] [place] [address]    Frailty Screening:   Is the patient here for a new oncology consult visit in cancer care? 2. No      Clinical concerns: No specific oncology concerns.       Cyndi Polo CMA ([facility]) 1/29/2025 10:41 AM  "
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
## 2025-02-19 ENCOUNTER — LAB (OUTPATIENT)
Dept: LAB | Facility: OTHER | Age: 85
End: 2025-02-19
Payer: MEDICARE

## 2025-02-19 DIAGNOSIS — E55.9 VITAMIN D DEFICIENCY, UNSPECIFIED: ICD-10-CM

## 2025-02-19 DIAGNOSIS — M89.9 BONE DISORDER: ICD-10-CM

## 2025-02-19 DIAGNOSIS — M81.0 AGE-RELATED OSTEOPOROSIS WITHOUT CURRENT PATHOLOGICAL FRACTURE: ICD-10-CM

## 2025-02-19 LAB
ALBUMIN SERPL BCG-MCNC: 4.4 G/DL (ref 3.5–5.2)
ALP SERPL-CCNC: 81 U/L (ref 40–150)
ALT SERPL W P-5'-P-CCNC: 20 U/L (ref 0–50)
ANION GAP SERPL CALCULATED.3IONS-SCNC: 11 MMOL/L (ref 7–15)
AST SERPL W P-5'-P-CCNC: 26 U/L (ref 0–45)
BASOPHILS # BLD AUTO: 0.1 10E3/UL (ref 0–0.2)
BASOPHILS NFR BLD AUTO: 1 %
BILIRUB SERPL-MCNC: 0.4 MG/DL
BUN SERPL-MCNC: 16.3 MG/DL (ref 8–23)
CA-I BLD-MCNC: 5.3 MG/DL (ref 4.4–5.2)
CALCIUM SERPL-MCNC: 10.4 MG/DL (ref 8.8–10.4)
CHLORIDE SERPL-SCNC: 101 MMOL/L (ref 98–107)
CREAT SERPL-MCNC: 0.99 MG/DL (ref 0.51–0.95)
EGFRCR SERPLBLD CKD-EPI 2021: 56 ML/MIN/1.73M2
EOSINOPHIL # BLD AUTO: 0.2 10E3/UL (ref 0–0.7)
EOSINOPHIL NFR BLD AUTO: 3 %
ERYTHROCYTE [DISTWIDTH] IN BLOOD BY AUTOMATED COUNT: 13.7 % (ref 10–15)
GLUCOSE SERPL-MCNC: 122 MG/DL (ref 70–99)
HCO3 SERPL-SCNC: 25 MMOL/L (ref 22–29)
HCT VFR BLD AUTO: 39.8 % (ref 35–47)
HGB BLD-MCNC: 13.2 G/DL (ref 11.7–15.7)
IMM GRANULOCYTES # BLD: 0 10E3/UL
IMM GRANULOCYTES NFR BLD: 0 %
LYMPHOCYTES # BLD AUTO: 0.9 10E3/UL (ref 0.8–5.3)
LYMPHOCYTES NFR BLD AUTO: 17 %
MAGNESIUM SERPL-MCNC: 2.3 MG/DL (ref 1.7–2.3)
MCH RBC QN AUTO: 32.8 PG (ref 26.5–33)
MCHC RBC AUTO-ENTMCNC: 33.2 G/DL (ref 31.5–36.5)
MCV RBC AUTO: 99 FL (ref 78–100)
MONOCYTES # BLD AUTO: 0.5 10E3/UL (ref 0–1.3)
MONOCYTES NFR BLD AUTO: 9 %
NEUTROPHILS # BLD AUTO: 3.7 10E3/UL (ref 1.6–8.3)
NEUTROPHILS NFR BLD AUTO: 69 %
NRBC # BLD AUTO: 0 10E3/UL
NRBC BLD AUTO-RTO: 0 /100
PLATELET # BLD AUTO: 237 10E3/UL (ref 150–450)
POTASSIUM SERPL-SCNC: 4.4 MMOL/L (ref 3.4–5.3)
PROT SERPL-MCNC: 7.2 G/DL (ref 6.4–8.3)
PTH-INTACT SERPL-MCNC: 48 PG/ML (ref 15–65)
RBC # BLD AUTO: 4.03 10E6/UL (ref 3.8–5.2)
SODIUM SERPL-SCNC: 137 MMOL/L (ref 135–145)
VIT D+METAB SERPL-MCNC: 50 NG/ML (ref 20–50)
WBC # BLD AUTO: 5.3 10E3/UL (ref 4–11)

## 2025-02-19 PROCEDURE — 83970 ASSAY OF PARATHORMONE: CPT | Mod: ZL

## 2025-02-19 PROCEDURE — 84080 ASSAY ALKALINE PHOSPHATASES: CPT | Mod: ZL

## 2025-02-19 PROCEDURE — 82306 VITAMIN D 25 HYDROXY: CPT | Mod: ZL

## 2025-02-19 PROCEDURE — 82330 ASSAY OF CALCIUM: CPT | Mod: ZL

## 2025-02-19 PROCEDURE — 85004 AUTOMATED DIFF WBC COUNT: CPT | Mod: ZL

## 2025-02-19 PROCEDURE — 80053 COMPREHEN METABOLIC PANEL: CPT | Mod: ZL

## 2025-02-19 PROCEDURE — 83521 IG LIGHT CHAINS FREE EACH: CPT | Mod: ZL

## 2025-02-19 PROCEDURE — 83735 ASSAY OF MAGNESIUM: CPT | Mod: ZL

## 2025-02-19 PROCEDURE — 85014 HEMATOCRIT: CPT | Mod: ZL

## 2025-02-19 PROCEDURE — 36415 COLL VENOUS BLD VENIPUNCTURE: CPT | Mod: ZL

## 2025-02-20 LAB
KAPPA LC FREE SER-MCNC: 1.66 MG/DL (ref 0.33–1.94)
KAPPA LC FREE/LAMBDA FREE SER NEPH: 1.15 {RATIO} (ref 0.26–1.65)
LAMBDA LC FREE SERPL-MCNC: 1.44 MG/DL (ref 0.57–2.63)

## 2025-02-21 LAB — ALP BONE SERPL-MCNC: 10.6 UG/L

## 2025-02-27 ENCOUNTER — TRANSFERRED RECORDS (OUTPATIENT)
Dept: HEALTH INFORMATION MANAGEMENT | Facility: OTHER | Age: 85
End: 2025-02-27
Payer: COMMERCIAL

## 2025-03-18 ENCOUNTER — HOSPITAL ENCOUNTER (OUTPATIENT)
Dept: MAMMOGRAPHY | Facility: OTHER | Age: 85
Discharge: HOME | End: 2025-03-18
Attending: NURSE PRACTITIONER | Admitting: NURSE PRACTITIONER
Payer: MEDICARE

## 2025-03-18 DIAGNOSIS — Z12.31 ENCOUNTER FOR SCREENING MAMMOGRAM FOR MALIGNANT NEOPLASM OF BREAST: ICD-10-CM

## 2025-03-18 DIAGNOSIS — D05.11 DUCTAL CARCINOMA IN SITU (DCIS) OF RIGHT BREAST: ICD-10-CM

## 2025-03-18 PROCEDURE — 77063 BREAST TOMOSYNTHESIS BI: CPT

## 2025-03-27 ENCOUNTER — HOSPITAL ENCOUNTER (OUTPATIENT)
Dept: MAMMOGRAPHY | Facility: OTHER | Age: 85
Discharge: HOME | End: 2025-03-27
Attending: NURSE PRACTITIONER
Payer: MEDICARE

## 2025-03-27 ENCOUNTER — HOSPITAL ENCOUNTER (OUTPATIENT)
Dept: ULTRASOUND IMAGING | Facility: OTHER | Age: 85
Discharge: HOME | End: 2025-03-27
Attending: NURSE PRACTITIONER
Payer: MEDICARE

## 2025-03-27 DIAGNOSIS — R92.8 ABNORMAL FINDING ON BREAST IMAGING: ICD-10-CM

## 2025-03-27 PROCEDURE — 76642 ULTRASOUND BREAST LIMITED: CPT | Mod: LT

## 2025-03-27 PROCEDURE — 77065 DX MAMMO INCL CAD UNI: CPT | Mod: LT

## 2025-03-28 ENCOUNTER — MYC MEDICAL ADVICE (OUTPATIENT)
Dept: ONCOLOGY | Facility: OTHER | Age: 85
End: 2025-03-28
Payer: COMMERCIAL

## 2025-04-02 ENCOUNTER — HOSPITAL ENCOUNTER (OUTPATIENT)
Dept: MAMMOGRAPHY | Facility: OTHER | Age: 85
Discharge: HOME | End: 2025-04-02
Attending: NURSE PRACTITIONER
Payer: MEDICARE

## 2025-04-02 ENCOUNTER — HOSPITAL ENCOUNTER (OUTPATIENT)
Dept: ULTRASOUND IMAGING | Facility: OTHER | Age: 85
Discharge: HOME | End: 2025-04-02
Attending: NURSE PRACTITIONER
Payer: MEDICARE

## 2025-04-02 DIAGNOSIS — R92.8 ABNORMAL FINDING ON BREAST IMAGING: ICD-10-CM

## 2025-04-02 PROCEDURE — 250N000011 HC RX IP 250 OP 636: Performed by: STUDENT IN AN ORGANIZED HEALTH CARE EDUCATION/TRAINING PROGRAM

## 2025-04-02 PROCEDURE — 250N000009 HC RX 250: Performed by: STUDENT IN AN ORGANIZED HEALTH CARE EDUCATION/TRAINING PROGRAM

## 2025-04-02 PROCEDURE — 19083 BX BREAST 1ST LESION US IMAG: CPT | Mod: LT

## 2025-04-02 PROCEDURE — 999N000065 MA POST PROCEDURE LEFT

## 2025-04-02 PROCEDURE — A4648 IMPLANTABLE TISSUE MARKER: HCPCS

## 2025-04-02 RX ORDER — LIDOCAINE HYDROCHLORIDE AND EPINEPHRINE 10; 10 MG/ML; UG/ML
20 INJECTION, SOLUTION INFILTRATION; PERINEURAL ONCE
Status: COMPLETED | OUTPATIENT
Start: 2025-04-02 | End: 2025-04-02

## 2025-04-02 RX ORDER — LIDOCAINE HYDROCHLORIDE 10 MG/ML
20 INJECTION, SOLUTION INFILTRATION; PERINEURAL ONCE
Status: COMPLETED | OUTPATIENT
Start: 2025-04-02 | End: 2025-04-02

## 2025-04-02 RX ADMIN — LIDOCAINE HYDROCHLORIDE 2 ML: 10 INJECTION, SOLUTION EPIDURAL; INFILTRATION; INTRACAUDAL; PERINEURAL at 14:18

## 2025-04-02 RX ADMIN — LIDOCAINE HYDROCHLORIDE AND EPINEPHRINE 8 ML: 10; 10 INJECTION, SOLUTION INFILTRATION; PERINEURAL at 14:18

## 2025-04-02 NOTE — DISCHARGE INSTRUCTIONS
After Your Breast Biopsy  Bleeding, bruising, and pain  Breast tenderness and some bruising is normal and may last several days. You may wear your bra overnight to support the breast.  You may use an ice pack for pain. Place it over the area for 15 to 20 minutes, several times a day.  You may take over-the-counter pain medicine:  On the day of the biopsy, we recommend Tylenol (acetaminophen) because it does not raise your risk of bleeding.  The next day, you may take an anti-inflammatory medicine (aspirin, ibuprofen, Motrin, Aleve, Advil), unless your doctor tells you not to.  Bandages and showering  Keep your bandage in place until tomorrow morning. Don't get it wet.  If you have small pieces of tape on the skin, leave them in place. They will fall off on their own, or you can remove them after 5 days.  You may shower the next morning after your biopsy.  Activity  No heavy activity (no running, no gym workouts, no lifting, no vacuuming, etc.) on the day of your biopsy.  You may go back to normal activity the next day. But limit what you do if you still have pain or discomfort.  Infection  Infection is rare. Signs of infection include:  Fever (including sweats and chills)  Redness  Pain that gets worse  Fluid draining from the biopsy site  Biopsy results  Results may take up to 5 business days.  A nurse or doctor from the Breast Center will call with your results. We will also send the results to the doctor that ordered your biopsy.  If you have not gotten your results in 5 days, please call the Breast Center.  Call the Breast Center with questions or if:   You have bleeding that lasts more than 20 minutes.  You have pain that you can't control.  You have signs of infection (fever, sweats, chills, redness, increasing pain, or drainage).  After hours, please call the doctor who ordered your biopsy.  For informational purposes only. Not to replace the advice of your health care provider. Copyright   2010 [facility]. All rights reserved. Clinically reviewed by Peace Sierra, Director, [facility]. [employer] [id number] - REV 08/23.

## 2025-04-02 NOTE — PROGRESS NOTES
Patient here for ultrasound guided biopsy of left breast.  Procedure reviewed with patient by writer and radiologist, questions answered.  Time out performed prior to biopsy.  Biopsy completed by radiologist, clip placed.  Pressure held to biopsy site for 10 minutes.  Medipore dressing applied.   Post clip mammogram completed.  Sports bra and ice pack applied over dressing.  Discharge instructions reviewed with patient, patient verbalizes understanding of instructions.  Discharged to home in stable condition with no evidence of bleeding from biopsy site.   Ruby Mckinley RN.

## 2025-04-03 ENCOUNTER — TELEPHONE (OUTPATIENT)
Dept: SURGERY | Facility: OTHER | Age: 85
End: 2025-04-03
Payer: COMMERCIAL

## 2025-04-03 NOTE — TELEPHONE ENCOUNTER
Called patient to check on status post ultrasound breast biopsy.  Patient reports pain 1/10.  Patient reports no bleeding.  Patient verbalizes understanding of importance of attending results appointment with Dr. Cifuentes on 4/8 at 0940.  Ruby Mckinley RN.

## 2025-04-07 LAB
PATH REPORT.COMMENTS IMP SPEC: NORMAL
PATH REPORT.FINAL DX SPEC: NORMAL
PHOTO IMAGE: NORMAL

## 2025-04-08 ENCOUNTER — OFFICE VISIT (OUTPATIENT)
Dept: SURGERY | Facility: OTHER | Age: 85
End: 2025-04-08
Attending: NURSE PRACTITIONER
Payer: MEDICARE

## 2025-04-08 VITALS
SYSTOLIC BLOOD PRESSURE: 110 MMHG | WEIGHT: 157 LBS | DIASTOLIC BLOOD PRESSURE: 80 MMHG | HEART RATE: 72 BPM | BODY MASS INDEX: 26.53 KG/M2 | RESPIRATION RATE: 16 BRPM | TEMPERATURE: 97.8 F | OXYGEN SATURATION: 99 %

## 2025-04-08 DIAGNOSIS — Z86.000 HISTORY OF DUCTAL CARCINOMA IN SITU (DCIS) OF RIGHT BREAST: ICD-10-CM

## 2025-04-08 DIAGNOSIS — Z17.0 MALIGNANT NEOPLASM OF UPPER-OUTER QUADRANT OF LEFT BREAST IN FEMALE, ESTROGEN RECEPTOR POSITIVE (H): Primary | ICD-10-CM

## 2025-04-08 DIAGNOSIS — C50.412 MALIGNANT NEOPLASM OF UPPER-OUTER QUADRANT OF LEFT BREAST IN FEMALE, ESTROGEN RECEPTOR POSITIVE (H): Primary | ICD-10-CM

## 2025-04-08 PROCEDURE — G0463 HOSPITAL OUTPT CLINIC VISIT: HCPCS

## 2025-04-08 RX ORDER — CEFAZOLIN SODIUM 2 G/100ML
2 INJECTION, SOLUTION INTRAVENOUS
OUTPATIENT
Start: 2025-04-08

## 2025-04-08 RX ORDER — CEFAZOLIN SODIUM 2 G/100ML
2 INJECTION, SOLUTION INTRAVENOUS SEE ADMIN INSTRUCTIONS
OUTPATIENT
Start: 2025-04-08

## 2025-04-08 RX ORDER — ACETAMINOPHEN 325 MG/1
975 TABLET ORAL ONCE
OUTPATIENT
Start: 2025-04-08 | End: 2025-04-08

## 2025-04-08 ASSESSMENT — PAIN SCALES - GENERAL: PAINLEVEL_OUTOF10: MILD PAIN (2)
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
## 2025-04-08 NOTE — NURSING NOTE
"Chief Complaint   Patient presents with    Consult     Left breast       Initial /80 (BP Location: Right arm, Patient Position: Sitting, Cuff Size: Adult Regular)   Pulse 72   Temp 97.8  F (36.6  C) (Tympanic)   Resp 16   Wt 71.2 kg (157 lb)   LMP 10/11/2003 (Approximate)   SpO2 99%   BMI 26.53 kg/m   Estimated body mass index is 26.53 kg/m  as calculated from the following:    Height as of 6/27/24: 1.638 m (5' 4.5\").    Weight as of this encounter: 71.2 kg (157 lb).  Medication Reconciliation: complete    At what age did you start menopause? Early 50's  What age did your menstrual cycle start? 13  Are you on or have you ever taken any hormone replacement or birth control? both  How many children do you have? 3  How old were you when your first child was born? 28  Did you breast feed? yes  Do you have a family history of breast cancer? 2 sisters, one was 43 the other in her 60's  Diana Govea LPN..........4/8/2025  10:38 AM  "

## 2025-04-14 LAB
PATH REPORT.ADDENDUM SPEC: NORMAL
PATH REPORT.COMMENTS IMP SPEC: NORMAL
PATH REPORT.FINAL DX SPEC: NORMAL
PHOTO IMAGE: NORMAL

## 2025-04-15 ENCOUNTER — ANESTHESIA EVENT (OUTPATIENT)
Dept: SURGERY | Facility: OTHER | Age: 85
End: 2025-04-15
Payer: MEDICARE

## 2025-04-16 ENCOUNTER — HOSPITAL ENCOUNTER (OUTPATIENT)
Dept: ULTRASOUND IMAGING | Facility: OTHER | Age: 85
Discharge: HOME | End: 2025-04-16
Attending: SURGERY | Admitting: SURGERY
Payer: MEDICARE

## 2025-04-16 ENCOUNTER — HOSPITAL ENCOUNTER (OUTPATIENT)
Facility: OTHER | Age: 85
Discharge: HOME | End: 2025-04-16
Attending: SURGERY | Admitting: SURGERY
Payer: MEDICARE

## 2025-04-16 ENCOUNTER — HOSPITAL ENCOUNTER (OUTPATIENT)
Dept: MAMMOGRAPHY | Facility: OTHER | Age: 85
Discharge: HOME | End: 2025-04-16
Attending: SURGERY | Admitting: SURGERY
Payer: MEDICARE

## 2025-04-16 ENCOUNTER — ANESTHESIA (OUTPATIENT)
Dept: SURGERY | Facility: OTHER | Age: 85
End: 2025-04-16
Payer: MEDICARE

## 2025-04-16 VITALS
RESPIRATION RATE: 18 BRPM | SYSTOLIC BLOOD PRESSURE: 141 MMHG | HEART RATE: 71 BPM | BODY MASS INDEX: 26.46 KG/M2 | TEMPERATURE: 97.2 F | HEIGHT: 64 IN | DIASTOLIC BLOOD PRESSURE: 82 MMHG | OXYGEN SATURATION: 96 % | WEIGHT: 155 LBS

## 2025-04-16 DIAGNOSIS — C50.412 MALIGNANT NEOPLASM OF UPPER-OUTER QUADRANT OF LEFT BREAST IN FEMALE, ESTROGEN RECEPTOR POSITIVE (H): ICD-10-CM

## 2025-04-16 DIAGNOSIS — Z17.0 MALIGNANT NEOPLASM OF UPPER-OUTER QUADRANT OF LEFT BREAST IN FEMALE, ESTROGEN RECEPTOR POSITIVE (H): ICD-10-CM

## 2025-04-16 DIAGNOSIS — Z17.0 MALIGNANT NEOPLASM OF UPPER-OUTER QUADRANT OF LEFT BREAST IN FEMALE, ESTROGEN RECEPTOR POSITIVE (H): Primary | ICD-10-CM

## 2025-04-16 DIAGNOSIS — C50.412 MALIGNANT NEOPLASM OF UPPER-OUTER QUADRANT OF LEFT BREAST IN FEMALE, ESTROGEN RECEPTOR POSITIVE (H): Primary | ICD-10-CM

## 2025-04-16 PROCEDURE — 360N000075 HC SURGERY LEVEL 2, PER MIN: Performed by: SURGERY

## 2025-04-16 PROCEDURE — 250N000009 HC RX 250: Performed by: SURGERY

## 2025-04-16 PROCEDURE — 250N000011 HC RX IP 250 OP 636: Performed by: SURGERY

## 2025-04-16 PROCEDURE — 272N000001 HC OR GENERAL SUPPLY STERILE: Performed by: SURGERY

## 2025-04-16 PROCEDURE — 710N000010 HC RECOVERY PHASE 1, LEVEL 2, PER MIN: Performed by: SURGERY

## 2025-04-16 PROCEDURE — 710N000012 HC RECOVERY PHASE 2, PER MINUTE: Performed by: SURGERY

## 2025-04-16 PROCEDURE — 76098 X-RAY EXAM SURGICAL SPECIMEN: CPT

## 2025-04-16 PROCEDURE — 999N000141 HC STATISTIC PRE-PROCEDURE NURSING ASSESSMENT: Performed by: SURGERY

## 2025-04-16 PROCEDURE — 19301 PARTIAL MASTECTOMY: CPT | Mod: LT | Performed by: SURGERY

## 2025-04-16 PROCEDURE — 258N000003 HC RX IP 258 OP 636: Performed by: NURSE ANESTHETIST, CERTIFIED REGISTERED

## 2025-04-16 PROCEDURE — 250N000013 HC RX MED GY IP 250 OP 250 PS 637: Performed by: SURGERY

## 2025-04-16 PROCEDURE — C1819 TISSUE LOCALIZATION-EXCISION: HCPCS

## 2025-04-16 PROCEDURE — 999N000065 MA POST PROCEDURE LEFT

## 2025-04-16 PROCEDURE — 250N000011 HC RX IP 250 OP 636: Mod: JZ | Performed by: NURSE ANESTHETIST, CERTIFIED REGISTERED

## 2025-04-16 PROCEDURE — 250N000009 HC RX 250: Performed by: NURSE ANESTHETIST, CERTIFIED REGISTERED

## 2025-04-16 PROCEDURE — 370N000017 HC ANESTHESIA TECHNICAL FEE, PER MIN: Performed by: SURGERY

## 2025-04-16 RX ORDER — HYDROMORPHONE HCL IN WATER/PF 6 MG/30 ML
0.2 PATIENT CONTROLLED ANALGESIA SYRINGE INTRAVENOUS EVERY 5 MIN PRN
Status: DISCONTINUED | OUTPATIENT
Start: 2025-04-16 | End: 2025-04-16 | Stop reason: HOSPADM

## 2025-04-16 RX ORDER — FENTANYL CITRATE 50 UG/ML
INJECTION, SOLUTION INTRAMUSCULAR; INTRAVENOUS PRN
Status: DISCONTINUED | OUTPATIENT
Start: 2025-04-16 | End: 2025-04-16

## 2025-04-16 RX ORDER — LIDOCAINE HYDROCHLORIDE 10 MG/ML
20 INJECTION, SOLUTION INFILTRATION; PERINEURAL ONCE
Status: COMPLETED | OUTPATIENT
Start: 2025-04-16 | End: 2025-04-16

## 2025-04-16 RX ORDER — NALOXONE HYDROCHLORIDE 0.4 MG/ML
0.1 INJECTION, SOLUTION INTRAMUSCULAR; INTRAVENOUS; SUBCUTANEOUS
Status: DISCONTINUED | OUTPATIENT
Start: 2025-04-16 | End: 2025-04-16 | Stop reason: HOSPADM

## 2025-04-16 RX ORDER — HYDROMORPHONE HCL IN WATER/PF 6 MG/30 ML
0.4 PATIENT CONTROLLED ANALGESIA SYRINGE INTRAVENOUS EVERY 5 MIN PRN
Status: DISCONTINUED | OUTPATIENT
Start: 2025-04-16 | End: 2025-04-16 | Stop reason: HOSPADM

## 2025-04-16 RX ORDER — IBUPROFEN 200 MG
600 TABLET ORAL
Status: COMPLETED | OUTPATIENT
Start: 2025-04-16 | End: 2025-04-16

## 2025-04-16 RX ORDER — FENTANYL CITRATE 50 UG/ML
25 INJECTION, SOLUTION INTRAMUSCULAR; INTRAVENOUS EVERY 5 MIN PRN
Status: DISCONTINUED | OUTPATIENT
Start: 2025-04-16 | End: 2025-04-16 | Stop reason: HOSPADM

## 2025-04-16 RX ORDER — PROPOFOL 10 MG/ML
INJECTION, EMULSION INTRAVENOUS PRN
Status: DISCONTINUED | OUTPATIENT
Start: 2025-04-16 | End: 2025-04-16

## 2025-04-16 RX ORDER — DEXAMETHASONE SODIUM PHOSPHATE 10 MG/ML
4 INJECTION, SOLUTION INTRAMUSCULAR; INTRAVENOUS
Status: DISCONTINUED | OUTPATIENT
Start: 2025-04-16 | End: 2025-04-16 | Stop reason: HOSPADM

## 2025-04-16 RX ORDER — ONDANSETRON 2 MG/ML
4 INJECTION INTRAMUSCULAR; INTRAVENOUS EVERY 30 MIN PRN
Status: DISCONTINUED | OUTPATIENT
Start: 2025-04-16 | End: 2025-04-16 | Stop reason: HOSPADM

## 2025-04-16 RX ORDER — OXYCODONE HYDROCHLORIDE 5 MG/1
5 TABLET ORAL
Status: DISCONTINUED | OUTPATIENT
Start: 2025-04-16 | End: 2025-04-16 | Stop reason: HOSPADM

## 2025-04-16 RX ORDER — ONDANSETRON 4 MG/1
4 TABLET, ORALLY DISINTEGRATING ORAL EVERY 30 MIN PRN
Status: DISCONTINUED | OUTPATIENT
Start: 2025-04-16 | End: 2025-04-16 | Stop reason: HOSPADM

## 2025-04-16 RX ORDER — DEXAMETHASONE SODIUM PHOSPHATE 4 MG/ML
INJECTION, SOLUTION INTRA-ARTICULAR; INTRALESIONAL; INTRAMUSCULAR; INTRAVENOUS; SOFT TISSUE PRN
Status: DISCONTINUED | OUTPATIENT
Start: 2025-04-16 | End: 2025-04-16

## 2025-04-16 RX ORDER — SODIUM CHLORIDE, SODIUM LACTATE, POTASSIUM CHLORIDE, CALCIUM CHLORIDE 600; 310; 30; 20 MG/100ML; MG/100ML; MG/100ML; MG/100ML
INJECTION, SOLUTION INTRAVENOUS CONTINUOUS
Status: DISCONTINUED | OUTPATIENT
Start: 2025-04-16 | End: 2025-04-16 | Stop reason: HOSPADM

## 2025-04-16 RX ORDER — LIDOCAINE 40 MG/G
CREAM TOPICAL
Status: DISCONTINUED | OUTPATIENT
Start: 2025-04-16 | End: 2025-04-16 | Stop reason: HOSPADM

## 2025-04-16 RX ORDER — OXYCODONE HYDROCHLORIDE 5 MG/1
10 TABLET ORAL
Status: DISCONTINUED | OUTPATIENT
Start: 2025-04-16 | End: 2025-04-16 | Stop reason: HOSPADM

## 2025-04-16 RX ORDER — ACETAMINOPHEN 325 MG/1
975 TABLET ORAL ONCE
Status: COMPLETED | OUTPATIENT
Start: 2025-04-16 | End: 2025-04-16

## 2025-04-16 RX ORDER — BUPIVACAINE HYDROCHLORIDE AND EPINEPHRINE 5; 5 MG/ML; UG/ML
INJECTION, SOLUTION EPIDURAL; INTRACAUDAL; PERINEURAL PRN
Status: DISCONTINUED | OUTPATIENT
Start: 2025-04-16 | End: 2025-04-16 | Stop reason: HOSPADM

## 2025-04-16 RX ORDER — PROPOFOL 10 MG/ML
INJECTION, EMULSION INTRAVENOUS CONTINUOUS PRN
Status: DISCONTINUED | OUTPATIENT
Start: 2025-04-16 | End: 2025-04-16

## 2025-04-16 RX ORDER — ONDANSETRON 2 MG/ML
INJECTION INTRAMUSCULAR; INTRAVENOUS PRN
Status: DISCONTINUED | OUTPATIENT
Start: 2025-04-16 | End: 2025-04-16

## 2025-04-16 RX ORDER — FENTANYL CITRATE 50 UG/ML
50 INJECTION, SOLUTION INTRAMUSCULAR; INTRAVENOUS EVERY 5 MIN PRN
Status: DISCONTINUED | OUTPATIENT
Start: 2025-04-16 | End: 2025-04-16 | Stop reason: HOSPADM

## 2025-04-16 RX ORDER — CEFAZOLIN SODIUM/WATER 2 G/20 ML
2 SYRINGE (ML) INTRAVENOUS SEE ADMIN INSTRUCTIONS
Status: DISCONTINUED | OUTPATIENT
Start: 2025-04-16 | End: 2025-04-16 | Stop reason: HOSPADM

## 2025-04-16 RX ORDER — LIDOCAINE HYDROCHLORIDE 20 MG/ML
INJECTION, SOLUTION INFILTRATION; PERINEURAL PRN
Status: DISCONTINUED | OUTPATIENT
Start: 2025-04-16 | End: 2025-04-16

## 2025-04-16 RX ORDER — CEFAZOLIN SODIUM/WATER 2 G/20 ML
2 SYRINGE (ML) INTRAVENOUS
Status: COMPLETED | OUTPATIENT
Start: 2025-04-16 | End: 2025-04-16

## 2025-04-16 RX ADMIN — IBUPROFEN 400 MG: 200 TABLET, FILM COATED ORAL at 11:16

## 2025-04-16 RX ADMIN — DEXAMETHASONE SODIUM PHOSPHATE 4 MG: 4 INJECTION, SOLUTION INTRA-ARTICULAR; INTRALESIONAL; INTRAMUSCULAR; INTRAVENOUS; SOFT TISSUE at 10:02

## 2025-04-16 RX ADMIN — CEFAZOLIN 2 G: 10 INJECTION, POWDER, FOR SOLUTION INTRAVENOUS at 09:26

## 2025-04-16 RX ADMIN — PHENYLEPHRINE HYDROCHLORIDE 100 MCG: 10 INJECTION INTRAVENOUS at 10:00

## 2025-04-16 RX ADMIN — PROPOFOL 150 MCG/KG/MIN: 10 INJECTION, EMULSION INTRAVENOUS at 09:39

## 2025-04-16 RX ADMIN — PROPOFOL 120 MG: 10 INJECTION, EMULSION INTRAVENOUS at 09:39

## 2025-04-16 RX ADMIN — LIDOCAINE HYDROCHLORIDE 40 MG: 20 INJECTION, SOLUTION INFILTRATION; PERINEURAL at 09:39

## 2025-04-16 RX ADMIN — FENTANYL CITRATE 25 MCG: 50 INJECTION INTRAMUSCULAR; INTRAVENOUS at 09:46

## 2025-04-16 RX ADMIN — PHENYLEPHRINE HYDROCHLORIDE 100 MCG: 10 INJECTION INTRAVENOUS at 09:53

## 2025-04-16 RX ADMIN — PROPOFOL 20 MG: 10 INJECTION, EMULSION INTRAVENOUS at 09:55

## 2025-04-16 RX ADMIN — ACETAMINOPHEN 975 MG: 325 TABLET, FILM COATED ORAL at 07:39

## 2025-04-16 RX ADMIN — PHENYLEPHRINE HYDROCHLORIDE 100 MCG: 10 INJECTION INTRAVENOUS at 10:08

## 2025-04-16 RX ADMIN — LIDOCAINE HYDROCHLORIDE 4 ML: 10 INJECTION, SOLUTION EPIDURAL; INFILTRATION; INTRACAUDAL; PERINEURAL at 08:41

## 2025-04-16 RX ADMIN — DEXAMETHASONE SODIUM PHOSPHATE 4 MG: 4 INJECTION, SOLUTION INTRA-ARTICULAR; INTRALESIONAL; INTRAMUSCULAR; INTRAVENOUS; SOFT TISSUE at 09:39

## 2025-04-16 RX ADMIN — SODIUM CHLORIDE, SODIUM LACTATE, POTASSIUM CHLORIDE, AND CALCIUM CHLORIDE: .6; .31; .03; .02 INJECTION, SOLUTION INTRAVENOUS at 09:25

## 2025-04-16 RX ADMIN — ONDANSETRON HYDROCHLORIDE 4 MG: 2 SOLUTION INTRAMUSCULAR; INTRAVENOUS at 09:39

## 2025-04-16 ASSESSMENT — ACTIVITIES OF DAILY LIVING (ADL)
ADLS_ACUITY_SCORE: 45

## 2025-04-16 ASSESSMENT — ENCOUNTER SYMPTOMS: DYSRHYTHMIAS: 1

## 2025-04-16 NOTE — OP NOTE
Preoperative Diagnosis:  left Breast cancer    Postoperative Diagnosis:  left Breast cancer    Procedure Planned:  left Breast Lumpectomy, with preoperative localization    Procedure Performed: left Breast Lumpectomy, with preoperative localization     Surgeon:  Betina Cifuentes MD  Circulator: Laurel Parker RN  Scrub Person: Chad Marcano    Anesthesia:  general/local     Specimen:  left breast tissue to pathology    Estimated Blood Loss:10 mL      INDICATIONS  Please see the consultation. The patient presents with left breast nodule. Previous biopsy revealed invasive ductal cancer. The risks, benefits and alternatives to lumpectomy with localization for treating breast cancer were discussed with the patient. We specifically discussed the risks of infection, bleeding, scarring, breast deformity and the possible need for further procedures. The patient expressed understanding and questions were answered. Informed consent paperwork was completed.     DESCRIPTION OF PROCEDURE  The patient was brought to the operating room and placed in a supine position on the operating table. Appropriate monitors were attached. The patient received IV antibiotics preoperatively. After sedation was initiated, the patient was positioned, prepped and draped in the standard fashion. Time out was performed confirming the patient's identity and procedure to be performed.  The localizing wire was noted to be in position in the left breast. Local anesthetic was infiltrated in the skin and subcutaneous tissue near the planned incision. Skin incision was made sharply and carried down to the subcutaneous tissue. Flaps were created. Dissection was carried out with electocautery around the localizing wire in normal appearing tissue. Hemostasis was appropriate. Once the specimen was dissected, it was oriented with inks per protocol. Specimen mammogram was performed. The biopsy clip, wire and nodule were visualized. The excision cavity  was irrigated with warm sterile water and excellent hemostasis was obtained using electrocautery.  Further local anesthetic was infiltrated for postop pain control. Skin edges were approximated using running Monocryl suture. Sterile dressing was applied. The patient was then awakened from anesthesia and taken to postanesthesia recovery in stable condition. All needle, sponge and instrument counts were reported as correct at the conclusion of the case. The patient tolerated the procedure with no immediately apparent complications.

## 2025-04-16 NOTE — ANESTHESIA PROCEDURE NOTES
Airway       Patient location during procedure: OR       Procedure Start/Stop Times: 4/16/2025 9:42 AM  Staff -        CRNA: Kellie Scott APRN CRNA       Performed By: CRNAIndications and Patient Condition       Indications for airway management: oseas-procedural       Induction type:intravenous       Mask difficulty assessment: 0 - not attempted    Final Airway Details       Final airway type: supraglottic airway    Supraglottic Airway Details        Type: LMA       Brand: I-Gel       LMA size: 3    Post intubation assessment        Placement verified by: capnometry        Number of attempts at approach: 1       Secured with: tape       Ease of procedure: easy       Dentition: Intact    Medication(s) Administered   Medication Administration Time: 4/16/2025 9:42 AM

## 2025-04-16 NOTE — ANESTHESIA POSTPROCEDURE EVALUATION
Patient: Verena Wise    Procedure: Procedure(s):  LUMPECTOMY, BREAST       Anesthesia Type:  General    Note:  Disposition: Outpatient   Postop Pain Control: Uneventful            Sign Out: Well controlled pain   PONV: No   Neuro/Psych: Uneventful            Sign Out: Acceptable/Baseline neuro status   Airway/Respiratory: Uneventful            Sign Out: Acceptable/Baseline resp. status   CV/Hemodynamics: Uneventful            Sign Out: Acceptable CV status; No obvious hypovolemia; No obvious fluid overload   Other NRE: NONE   DID A NON-ROUTINE EVENT OCCUR? No       Last vitals:  Vitals Value Taken Time   /79 04/16/25 1045   Temp 97.3  F (36.3  C) 04/16/25 1045   Pulse 68 04/16/25 1045   Resp 11 04/16/25 1045   SpO2 96 % 04/16/25 1045   Vitals shown include unfiled device data.    Electronically Signed By: SPIKE Pineda CRNA  April 16, 2025  12:33 PM

## 2025-04-16 NOTE — INTERVAL H&P NOTE
I have reviewed the surgical (or preoperative) H&P that is linked to this encounter, and examined the patient. There are no significant changes. I discussed left lumpectomy with the patient and her . Questions answered. Consent completed.     Clinical Conditions Present on Arrival:  Clinically Significant Risk Factors Present on Admission

## 2025-04-16 NOTE — ANESTHESIA CARE TRANSFER NOTE
Patient: Verena Wise    Procedure: Procedure(s):  LUMPECTOMY, BREAST       Diagnosis: Malignant neoplasm of upper-outer quadrant of left breast in female, estrogen receptor positive (H) [C50.412, Z17.0]  Diagnosis Additional Information: No value filed.    Anesthesia Type:   General     Note:    Oropharynx: spontaneously breathing  Level of Consciousness: drowsy and awake  Oxygen Supplementation: face mask  Level of Supplemental Oxygen (L/min / FiO2): 6  Independent Airway: airway patency satisfactory and stable    Vital Signs Stable: post-procedure vital signs reviewed and stable  Report to RN Given: handoff report given  Patient transferred to: PACU    Handoff Report: Identifed the Patient, Identified the Reponsible Provider, Reviewed the pertinent medical history, Discussed the surgical course, Reviewed Intra-OP anesthesia mangement and issues during anesthesia, Set expectations for post-procedure period and Allowed opportunity for questions and acknowledgement of understanding      Vitals:  Vitals Value Taken Time   BP     Temp     Pulse 67 04/16/25 1027   Resp 11 04/16/25 1027   SpO2 98 % 04/16/25 1027   Vitals shown include unfiled device data.    Electronically Signed By: SPIKE MORTON CRNA  April 16, 2025  10:28 AM
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
## 2025-04-16 NOTE — OR NURSING
patient has been discharged to home at 1200 noon via ambulation accompanied by RN and spouse Eladio.    Written discharge instructions were provided to patient and spouse.  Prescriptions were not ordered, pt's  purchased ibuprofen from our pharmacy to take home.  Patient states their pain is 5/10, improved and tolerable per pt, and denies any nausea or dizziness upon discharge.    Patient and adult caring for them verbalize understanding of discharge instructions including no driving until tomorrow and no longer taking narcotic pain medications - no operating mechanical equipment and no making any important decisions.They understand reason for discharge, and necessary follow-up appointments.

## 2025-04-16 NOTE — OR NURSING
PACU Transfer Note    Verena Wise was transferred to DSU via bed.  Equipment used for transport:  none.  Accompanied by:  Ana Paula MARTEL      PACU Respiratory Event Documentation     1) Episodes of Apnea greater than or equal to 10 seconds: 0    2) Bradypnea - less than 8 breaths per minute: 0    3) Pain score on 0 to 10 scale: 0    4) Pain-sedation mismatch (yes or no): no    5) Repeated 02 desaturation less than 90% (yes or no): no    Anesthesia notified? (yes or no): no    Any of the above events occuring repeatedly in separate 30 minute intervals may be considered recurrent PACU respiratory events.    Patient stable and meets phase 1 discharge criteria for transport from PACU.    Report to lev MARTEL

## 2025-04-16 NOTE — PROGRESS NOTES
Patient here for ultrasound guided wire localization of left breast.  Procedure reviewed with patient by writer and radiologist, questions answered.  Time out performed prior to wire localization.  Wire localization completed by radiologist.  Post procedure mammogram completed.  Cradle device applied to secure wire.   Patient escorted to same day surgery via wheelchair.  Patient into cart, side rails up, call light within reach.  Report given to TAHMINA Collins.  Ruby Mckinley RN.

## 2025-04-16 NOTE — DISCHARGE INSTRUCTIONS
Procedure you had done: left lumpectomy  Your health care provider is:  Kaia Quinn  Your surgeon is Dr. Betina Cifuentes.   Please call your health care provider or surgeon at [phone] if:  - you feel you are getting worse or having an increase in problems    - fever greater than 101 degrees  - increasing shortness of breath or chest pain  - any signs of infection (increasing redness, swelling, tenderness, warmth, change in appearance, or  increased drainage)  - nausea (upset stomach) and vomiting and/or diarrhea that will not stop  - severe pain that is not relieved by medicine, rest or ice     Home care following breast surgery:  You will be discharged when you are safe to go home. Anesthesia can change judgment, reaction time and coordination for several hours after you seem back to normal. Therefore, do not operate any motorized vehicles or power tools for 24 hours after discharge.     Activity:  You should rest or do quiet activities for the rest of the day. The day after surgery you may be as active as you feel able. You may find that you require more rest than usual the first 3-4 days as your body heals. Good breast support will help to decrease pain. Wear a supportive sports bra for the first 48 hours after surgery.       Diet:  Eat small amounts frequently after arriving home. Avoid foods that are hard to digest such as heavy, sweet, spicy, or fried foods until you are sure you feel well.  Vomiting can occur after general anesthesia. If vomiting lasts more than 12 hours call your doctor.     Other:  Problems urinating can happen after surgery.  Please call your physician if you have any problems.  Some people have constipation after surgery-you can use over the counter stool softener or laxative as needed.  You can use ice on the area of the incision to help with pain and swelling. Apply an ice pack wrapped in a towel to the area for 10-15 minutes once an hour.     Dressing:  Your incision was  covered with Steri-strips and a bandage. The bandage can be removed and you can shower 24 hours or more after the surgery. After you shower, dry your incision gently. You may apply a clean bandage if you want to. The Steri-strips will stay on for 5-7 days.   No soaking in a bath, hot tub, pool or lake for 5 days.      Drainage:   Bleeding or drainage should be minimal.  If bleeding soaks the dressings, cover with another sterile dressing.  If bleeding continues, apply gentle steady pressure over the incision for 5 minutes.  If bleeding persists or there is an increased swelling of the area, call your surgeon or go to the Emergency Room.      [facility]  Adult Discharge Orders & Instructions      For 24 hours after surgery:  Get plenty of rest.  A responsible adult must stay with you for at least 24 hours after you leave the hospital.   You may feel lightheaded.  IF so, sit for a few minutes before standing.  Have someone help you get up.   You may have a slight fever. Call the doctor if your fever is over 101 F (38.3 C) (taken under the tongue) or lasts longer than 24 hours.  You may have a dry mouth, a sore throat, muscle aches or trouble sleeping.  These should go away after 24 hours.  Do not make important or legal decisions.  6.   Do not drive or use heavy equipment.  If you have weakness or tingling, don't drive or use heavy equipment until this feeling goes away.                                                                                                                                                                         To contact a doctor, call    [phone]      You received 975mg of tylenol at 0740am today. You may take this medication again at 140pm. Do not take more than 4000mg of tylenol in 24 hours.    You also received 400mg of ibuprofen at 1116am. You may take this medication again at 516pm. Do not take more than 2400 of ibuprofen in 24 hours. Please take with food to avoid  upset stomach.

## 2025-04-17 NOTE — TELEPHONE ENCOUNTER
Called patient to check on status post lumpectomy yesterday, very little pain, no bleeding.  Had questions about when estrogen blocker would be started, we talked about how she will see oncology in about 4 weeks and they will talk with her about the pros and cons and they will decide together if she should take.

## 2025-04-22 ENCOUNTER — PATIENT OUTREACH (OUTPATIENT)
Dept: ONCOLOGY | Facility: OTHER | Age: 85
End: 2025-04-22

## 2025-04-22 ENCOUNTER — OFFICE VISIT (OUTPATIENT)
Dept: INTERNAL MEDICINE | Facility: OTHER | Age: 85
End: 2025-04-22
Attending: SURGERY
Payer: MEDICARE

## 2025-04-22 ENCOUNTER — OFFICE VISIT (OUTPATIENT)
Dept: SURGERY | Facility: OTHER | Age: 85
End: 2025-04-22
Attending: SURGERY
Payer: MEDICARE

## 2025-04-22 VITALS
RESPIRATION RATE: 16 BRPM | OXYGEN SATURATION: 96 % | BODY MASS INDEX: 26.37 KG/M2 | TEMPERATURE: 97.7 F | SYSTOLIC BLOOD PRESSURE: 130 MMHG | WEIGHT: 156 LBS | HEART RATE: 76 BPM | DIASTOLIC BLOOD PRESSURE: 82 MMHG

## 2025-04-22 VITALS
WEIGHT: 156 LBS | HEART RATE: 76 BPM | TEMPERATURE: 97.7 F | DIASTOLIC BLOOD PRESSURE: 82 MMHG | SYSTOLIC BLOOD PRESSURE: 130 MMHG | OXYGEN SATURATION: 96 % | RESPIRATION RATE: 16 BRPM | BODY MASS INDEX: 26.37 KG/M2

## 2025-04-22 DIAGNOSIS — Z08 ENCOUNTER FOR FOLLOW-UP EXAMINATION AFTER SURGERY FOR MALIGNANT NEOPLASM: Primary | ICD-10-CM

## 2025-04-22 DIAGNOSIS — C50.412 MALIGNANT NEOPLASM OF UPPER-OUTER QUADRANT OF LEFT BREAST IN FEMALE, ESTROGEN RECEPTOR POSITIVE (H): ICD-10-CM

## 2025-04-22 DIAGNOSIS — Z17.0 MALIGNANT NEOPLASM OF UPPER-OUTER QUADRANT OF LEFT BREAST IN FEMALE, ESTROGEN RECEPTOR POSITIVE (H): ICD-10-CM

## 2025-04-22 DIAGNOSIS — M43.6 SPASTIC TORTICOLLIS: ICD-10-CM

## 2025-04-22 DIAGNOSIS — G44.209 TENSION HEADACHE: Primary | ICD-10-CM

## 2025-04-22 DIAGNOSIS — R14.0 GASSINESS: ICD-10-CM

## 2025-04-22 PROCEDURE — G0463 HOSPITAL OUTPT CLINIC VISIT: HCPCS

## 2025-04-22 ASSESSMENT — ENCOUNTER SYMPTOMS: HEADACHES: 1

## 2025-04-22 ASSESSMENT — PAIN SCALES - GENERAL: PAINLEVEL_OUTOF10: MODERATE PAIN (4)

## 2025-04-22 NOTE — PATIENT INSTRUCTIONS
You will get a call to schedule with Dr. Thompson.   We'll walk you over to see Hillary about the headaches.  Please call with questions or concerns!

## 2025-04-22 NOTE — PROGRESS NOTES
Oncology/Hematology Care Coordination - Referral Review    Referred by:  Dr. 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 Last seen Annemarie Starr 1/29/25.    Most recent Imaging:  screening mammogram 3/18/25, diagnostic left mammogram and ultrasound 3/27/25     Most recent Lab:      Surgery/Biopsy:   left breast biopsy 4/2-left breast lumpectomy on 4/16/25.     Pathology:  6 mm invasive ductal cancer, grade 2/3, HR+, Her2 neg, Ki-67 11%. No associated DCIS. Margins clear.   Oncotype and imaging not ordered due to patient age    Outside Records:      Phyllis Hough RN on 4/22/2025 at 11:48 AM

## 2025-04-22 NOTE — PROGRESS NOTES
Patient presents for post surgical visit after left breast lumpectomy on 4/16/25. Patient has done well. No problems with incision.    /82 (BP Location: Right arm, Patient Position: Sitting, Cuff Size: Adult Regular)   Pulse 76   Temp 97.7  F (36.5  C) (Tympanic)   Resp 16   Wt 70.8 kg (156 lb)   LMP 10/11/2003 (Approximate)   SpO2 96%   BMI 26.37 kg/m    General: NAD, pleasant and cooperative with exam and interview.  Left breast: healing incision. No sign of infection. No pain with palpation.  Psychiatry: awake, alert and oriented. Appropriate affect.  Pathology results: 6 mm invasive ductal cancer, grade 2/3, HR+, Her2 neg, Ki-67 11%. No associated DCIS. Margins clear.  Assessment/Plan:  Discussed surgery and pathology results. Patient can return to normal activities. Oncology referral ordered. Will not order Oncotype or imaging due to patient age and declining chemotherapy.  Patient will call with questions or concerns.

## 2025-04-22 NOTE — NURSING NOTE
"Chief Complaint   Patient presents with    Surgical Followup     S/p lumpectomy       Initial /82 (BP Location: Right arm, Patient Position: Sitting, Cuff Size: Adult Regular)   Pulse 76   Temp 97.7  F (36.5  C) (Tympanic)   Resp 16   Wt 70.8 kg (156 lb)   LMP 10/11/2003 (Approximate)   SpO2 96%   BMI 26.37 kg/m   Estimated body mass index is 26.37 kg/m  as calculated from the following:    Height as of 4/16/25: 1.638 m (5' 4.49\").    Weight as of this encounter: 70.8 kg (156 lb).  Medication Reconciliation: complete    Diana Govea LPN  "

## 2025-04-22 NOTE — PROGRESS NOTES
"    ICD-10-CM    1. 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 Gassiness  R14.0          Plan  -Discussed topical pain patch as needed.  These can be purchased over-the-counter.  Referral to PT.  Information provided on after visit summary for patient instructions and guidance.  -Recommend holding fish oil and magnesium as can cause GI symptoms.  See if that does not relieve some of the gassiness.  If not and she is interested in a pharmacy consult, she will let me know and I will place referral.    Jacklyn Albarado is a 85 year old, presenting for the following health issues:  Headache      4/22/2025    11:02 AM   Additional Questions   Roomed by Joan MARTEL     Patient states she has had a headache for about 3 weeks.  It seems to be there most of the day.  Its around the entire top of her head.  She recently had breast surgery.  Headache was there prior.  She sleeps on her right side and is right-hand dominant.  Unable to sleep on left side because it makes her dizzy.  This is a longstanding problem.  No head trauma.  She has been applying ice to her neck and has found that that reduces headache symptoms.  Denies fever and URI symptoms.  Also is wondering what else could be causing gassiness besides fresh fruits and vegetables.  She has not started any new medications.  She does take several over-the-counter supplements.  This has been going on for several months and unchanged.    History of Present Illness       Headaches:   Since the patient's last clinic visit, headaches are: worsened  The patient is getting headaches:  Daily  She is not able to do normal daily activities when she has a migraine.  The patient is taking the following rescue/relief medications:  No rescue/relief medications   Patient states \"I get no relief\" from the rescue/relief medications.   The patient is taking the following medications to " prevent migraines:  No medications to prevent migraines  In the past 4 weeks, the patient has gone to an Urgent Care or Emergency Room 0 times times due to headaches.                       Objective    /82   Pulse 76   Temp 97.7  F (36.5  C)   Resp 16   Wt 70.8 kg (156 lb)   LMP 10/11/2003 (Approximate)   SpO2 96%   BMI 26.37 kg/m    Body mass index is 26.37 kg/m .  Physical Exam   Pleasant female no acute distress.  Tenderness with palpation to right of C5-C6 region.  Paracervical musculature is very tight through the right trapezius.  No pain with palpation to spinal processes or left paracervical musculature.  Range of motion slightly limited.  Kyphosis noted.            Signed Electronically by: Kaia Quinn NP

## 2025-04-29 ENCOUNTER — THERAPY VISIT (OUTPATIENT)
Dept: PHYSICAL THERAPY | Facility: OTHER | Age: 85
End: 2025-04-29
Attending: NURSE PRACTITIONER
Payer: COMMERCIAL

## 2025-04-29 DIAGNOSIS — G44.209 TENSION HEADACHE: ICD-10-CM

## 2025-04-29 DIAGNOSIS — M43.6 SPASTIC TORTICOLLIS: ICD-10-CM
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
## 2025-05-06 ENCOUNTER — THERAPY VISIT (OUTPATIENT)
Dept: PHYSICAL THERAPY | Facility: OTHER | Age: 85
End: 2025-05-06
Attending: NURSE PRACTITIONER
Payer: MEDICARE

## 2025-05-06 DIAGNOSIS — G44.209 TENSION HEADACHE: Primary | ICD-10-CM

## 2025-05-06 DIAGNOSIS — M43.6 SPASTIC TORTICOLLIS: ICD-10-CM

## 2025-05-07 PROBLEM — R51.9 HEADACHE: Status: ACTIVE | Noted: 2025-04-25

## 2025-05-07 PROBLEM — G45.9 TIA (TRANSIENT ISCHEMIC ATTACK): Status: ACTIVE | Noted: 2025-04-25

## 2025-05-07 PROBLEM — M35.00 SJOGREN SYNDROME, UNSPECIFIED: Status: ACTIVE | Noted: 2018-01-24

## 2025-05-07 PROBLEM — M85.80 OSTEOPENIA: Status: ACTIVE | Noted: 2025-04-25

## 2025-05-07 PROBLEM — M79.9 SOFT TISSUE LESION OF SHOULDER REGION: Status: ACTIVE | Noted: 2018-01-24

## 2025-05-07 PROBLEM — E21.3 HYPERPARATHYROIDISM: Status: ACTIVE | Noted: 2025-04-25

## 2025-05-07 PROBLEM — R07.9 CHEST PAIN: Status: ACTIVE | Noted: 2021-09-02

## 2025-05-07 PROBLEM — L82.1 SEBORRHEIC KERATOSES: Status: ACTIVE | Noted: 2025-04-25

## 2025-05-07 PROBLEM — L71.9 ROSACEA: Status: ACTIVE | Noted: 2025-04-25

## 2025-05-07 PROBLEM — M71.20 BAKER'S CYST OF KNEE: Status: ACTIVE | Noted: 2025-04-25

## 2025-05-07 PROBLEM — E83.52 HYPERCALCEMIA: Status: ACTIVE | Noted: 2025-04-25

## 2025-05-07 PROBLEM — J34.89: Status: ACTIVE | Noted: 2025-04-25

## 2025-05-07 PROBLEM — L90.5 SCAR: Status: ACTIVE | Noted: 2025-04-25

## 2025-05-07 PROBLEM — D22.9 BENIGN NEVUS: Status: ACTIVE | Noted: 2025-04-25

## 2025-05-07 PROBLEM — R06.02 SHORTNESS OF BREATH: Status: ACTIVE | Noted: 2025-04-25

## 2025-05-07 PROBLEM — M81.0 OSTEOPOROSIS: Status: ACTIVE | Noted: 2025-04-25

## 2025-05-07 PROBLEM — R21 FACIAL RASH: Status: ACTIVE | Noted: 2025-04-25

## 2025-05-07 RX ORDER — ROSUVASTATIN CALCIUM 5 MG/1
1 TABLET, COATED ORAL DAILY
COMMUNITY
Start: 2024-05-07

## 2025-05-07 RX ORDER — ALENDRONATE SODIUM 70 MG/1
1 TABLET ORAL
COMMUNITY
Start: 2024-10-23

## 2025-05-07 RX ORDER — B-COMPLEX WITH VITAMIN C
1 TABLET ORAL
COMMUNITY
End: 2025-05-12

## 2025-05-07 RX ORDER — VITAMIN E 268 MG
268 CAPSULE ORAL
COMMUNITY
Start: 2024-05-07

## 2025-05-07 RX ORDER — ACETAMINOPHEN 325 MG/1
325 TABLET ORAL
COMMUNITY
Start: 2024-05-07

## 2025-05-12 ENCOUNTER — OFFICE VISIT (OUTPATIENT)
Dept: INTERNAL MEDICINE | Facility: OTHER | Age: 85
End: 2025-05-12
Attending: NURSE PRACTITIONER
Payer: MEDICARE

## 2025-05-12 ENCOUNTER — RESULTS FOLLOW-UP (OUTPATIENT)
Dept: INTERNAL MEDICINE | Facility: OTHER | Age: 85
End: 2025-05-12

## 2025-05-12 VITALS
HEART RATE: 69 BPM | TEMPERATURE: 97.8 F | SYSTOLIC BLOOD PRESSURE: 124 MMHG | RESPIRATION RATE: 16 BRPM | BODY MASS INDEX: 26.41 KG/M2 | OXYGEN SATURATION: 98 % | WEIGHT: 156.2 LBS | DIASTOLIC BLOOD PRESSURE: 80 MMHG

## 2025-05-12 DIAGNOSIS — N18.31 STAGE 3A CHRONIC KIDNEY DISEASE (H): ICD-10-CM

## 2025-05-12 DIAGNOSIS — R31.29 MICROSCOPIC HEMATURIA: ICD-10-CM

## 2025-05-12 DIAGNOSIS — M43.6 SPASTIC TORTICOLLIS: ICD-10-CM

## 2025-05-12 DIAGNOSIS — R19.8 ALTERED BOWEL FUNCTION: ICD-10-CM

## 2025-05-12 DIAGNOSIS — R10.31 ABDOMINAL PAIN, RIGHT LOWER QUADRANT: Primary | ICD-10-CM

## 2025-05-12 DIAGNOSIS — G44.209 TENSION HEADACHE: ICD-10-CM

## 2025-05-12 LAB
ALBUMIN UR-MCNC: NEGATIVE MG/DL
AMORPH CRY #/AREA URNS HPF: ABNORMAL /HPF
ANION GAP SERPL CALCULATED.3IONS-SCNC: 10 MMOL/L (ref 7–15)
APPEARANCE UR: ABNORMAL
BILIRUB UR QL STRIP: NEGATIVE
BUN SERPL-MCNC: 17 MG/DL (ref 8–23)
CALCIUM SERPL-MCNC: 10.6 MG/DL (ref 8.8–10.4)
CHLORIDE SERPL-SCNC: 103 MMOL/L (ref 98–107)
COLOR UR AUTO: ABNORMAL
CREAT SERPL-MCNC: 0.97 MG/DL (ref 0.51–0.95)
EGFRCR SERPLBLD CKD-EPI 2021: 57 ML/MIN/1.73M2
ERYTHROCYTE [DISTWIDTH] IN BLOOD BY AUTOMATED COUNT: 13.9 % (ref 10–15)
GLUCOSE SERPL-MCNC: 100 MG/DL (ref 70–99)
GLUCOSE UR STRIP-MCNC: NEGATIVE MG/DL
HCO3 SERPL-SCNC: 27 MMOL/L (ref 22–29)
HCT VFR BLD AUTO: 41.6 % (ref 35–47)
HGB BLD-MCNC: 13.6 G/DL (ref 11.7–15.7)
HGB UR QL STRIP: NEGATIVE
KETONES UR STRIP-MCNC: NEGATIVE MG/DL
LEUKOCYTE ESTERASE UR QL STRIP: NEGATIVE
MCH RBC QN AUTO: 32.5 PG (ref 26.5–33)
MCHC RBC AUTO-ENTMCNC: 32.7 G/DL (ref 31.5–36.5)
MCV RBC AUTO: 99 FL (ref 78–100)
MUCOUS THREADS #/AREA URNS LPF: PRESENT /LPF
NITRATE UR QL: NEGATIVE
PH UR STRIP: 7.5 [PH] (ref 5–9)
PLATELET # BLD AUTO: 269 10E3/UL (ref 150–450)
POTASSIUM SERPL-SCNC: 4.7 MMOL/L (ref 3.4–5.3)
RBC # BLD AUTO: 4.19 10E6/UL (ref 3.8–5.2)
RBC URINE: 4 /HPF
SODIUM SERPL-SCNC: 140 MMOL/L (ref 135–145)
SP GR UR STRIP: 1.01 (ref 1–1.03)
UROBILINOGEN UR STRIP-MCNC: NORMAL MG/DL
WBC # BLD AUTO: 5.8 10E3/UL (ref 4–11)
WBC URINE: 5 /HPF

## 2025-05-12 PROCEDURE — 82310 ASSAY OF CALCIUM: CPT | Mod: ZL | Performed by: NURSE PRACTITIONER

## 2025-05-12 PROCEDURE — 85041 AUTOMATED RBC COUNT: CPT | Mod: ZL | Performed by: NURSE PRACTITIONER

## 2025-05-12 PROCEDURE — 81001 URINALYSIS AUTO W/SCOPE: CPT | Mod: ZL | Performed by: NURSE PRACTITIONER

## 2025-05-12 PROCEDURE — G0463 HOSPITAL OUTPT CLINIC VISIT: HCPCS | Mod: 25

## 2025-05-12 PROCEDURE — 36415 COLL VENOUS BLD VENIPUNCTURE: CPT | Mod: ZL | Performed by: NURSE PRACTITIONER

## 2025-05-12 RX ORDER — AMOXICILLIN 250 MG
1 CAPSULE ORAL 2 TIMES DAILY
Qty: 180 TABLET | Refills: 4 | Status: SHIPPED | OUTPATIENT
Start: 2025-05-12

## 2025-05-12 RX ORDER — METRONIDAZOLE TOPICAL 7.5 MG/G
GEL TOPICAL DAILY
Qty: 45 G | Refills: 3 | Status: CANCELLED | OUTPATIENT
Start: 2025-05-12

## 2025-05-12 RX ORDER — TRIAMCINOLONE ACETONIDE 5 MG/G
CREAM TOPICAL
Qty: 30 G | Refills: 3 | Status: CANCELLED | OUTPATIENT
Start: 2025-05-12

## 2025-05-12 SDOH — HEALTH STABILITY: PHYSICAL HEALTH: ON AVERAGE, HOW MANY DAYS PER WEEK DO YOU ENGAGE IN MODERATE TO STRENUOUS EXERCISE (LIKE A BRISK WALK)?: 0 DAYS

## 2025-05-12 ASSESSMENT — SOCIAL DETERMINANTS OF HEALTH (SDOH): HOW OFTEN DO YOU GET TOGETHER WITH FRIENDS OR RELATIVES?: ONCE A WEEK

## 2025-05-12 ASSESSMENT — PAIN SCALES - GENERAL: PAINLEVEL_OUTOF10: NO PAIN (0)

## 2025-05-12 NOTE — NURSING NOTE
"Chief Complaint   Patient presents with    Abdominal Pain       FOOD SECURITY SCREENING QUESTIONS  Hunger Vital Signs:  Within the past 12 months we worried whether our food would run out before we got money to buy more. Never  Within the past 12 months the food we bought just didn't last and we didn't have money to get more. Never  Chayito Azul RN 5/12/2025 9:44 AM      Initial /80 (BP Location: Right arm, Patient Position: Sitting, Cuff Size: Adult Large)   Pulse 69   Temp 97.8  F (36.6  C) (Tympanic)   Resp 16   Wt 70.9 kg (156 lb 3.2 oz)   LMP 10/11/2003 (Approximate)   SpO2 98%   BMI 26.41 kg/m   Estimated body mass index is 26.41 kg/m  as calculated from the following:    Height as of 4/16/25: 1.638 m (5' 4.49\").    Weight as of this encounter: 70.9 kg (156 lb 3.2 oz).  Medication Reconciliation: complete    Chayito Azul RN    "

## 2025-05-12 NOTE — PROGRESS NOTES
ICD-10-CM    1. Abdominal pain, right lower quadrant  R10.31 CBC with platelets     UA with Microscopic reflex to Culture     Basic metabolic panel     senna-docusate (SENOKOT-S/PERICOLACE) 8.6-50 MG tablet     CT Abdomen Pelvis w/o & w Contrast     CBC with platelets     UA with Microscopic reflex to Culture     Basic metabolic panel      2. Altered bowel function  R19.8 senna-docusate (SENOKOT-S/PERICOLACE) 8.6-50 MG tablet     CT Abdomen Pelvis w/o & w Contrast      3. Stage 3a chronic kidney disease (H)  N18.31       4. 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 Tension headache  G44.209          Plan:  -Check CBC, BMP, UA UC and order placed for CT abdomen and pelvis without and with contrast.  -Stop magnesium.  She is concerned she will get constipated therefore prescription sent for senna S 1 pill twice daily.  -If no found cause of abdominal pain may need referral for colonoscopy.  Reviewed and discussed risk.  -Tension headaches and neck pain resolved.  Continue to follow with PT.  -Follow-up in 3 weeks for abdominal pain and Medicare wellness visit.  If abdominal pain worsens or new symptoms develop then may need earlier evaluation.    Jacklyn Albarado is a 85 year old, presenting for the following health issues:  Abdominal Pain        5/12/2025     9:42 AM   Additional Questions   Roomed by Chayito MARTEL     HPI    1 month history of RLQ pain that is intermittent. Difficult describing quality of pain. Moderate pain intensity. Not present daily. Over past several months having 3 BMs daily were watery but no formed. No blood or dark stools.  Held fish oil but only held magnesium a couple of days due to gassiness. Mild improvement in gassiness. No change in the pain for the days she held both Magnesium and fish oil. Magnesium helps to prevent constipation. No burning with urination, occasional intermittent frequency. No hematuria.  No hx of diverticulitis.  She was seen in clinic on April 22 for neck pain and  had mentioned about gassiness.  She did not mention abdominal pain at that time.  Past history of stage IIIa chronic kidney disease.                    Objective    /80 (BP Location: Right arm, Patient Position: Sitting, Cuff Size: Adult Large)   Pulse 69   Temp 97.8  F (36.6  C) (Tympanic)   Resp 16   Wt 70.9 kg (156 lb 3.2 oz)   LMP 10/11/2003 (Approximate)   SpO2 98%   BMI 26.41 kg/m    Body mass index is 26.41 kg/m .  Physical Exam   GENERAL: alert and no distress  EYES: Eyes grossly normal to inspection, PERRL and conjunctivae and sclerae normal  NECK: no adenopathy, no asymmetry, masses, or scars  RESP: lungs clear to auscultation - no rales, rhonchi or wheezes  CV: regular rate and rhythm, normal S1 S2, no S3 or S4, no murmur, click or rub, no peripheral edema  ABDOMEN: soft, no hepatosplenomegaly, no masses and bowel sounds normal; positive tenderness right lower quadrant, no rebound tenderness or guarding.  MS: no gross musculoskeletal defects noted, no edema    April 22, 2025 labs normal with the exception of mild elevated calcium level          Signed Electronically by: Kaia Quinn NP

## 2025-05-13 ENCOUNTER — THERAPY VISIT (OUTPATIENT)
Dept: PHYSICAL THERAPY | Facility: OTHER | Age: 85
End: 2025-05-13
Attending: NURSE PRACTITIONER
Payer: COMMERCIAL

## 2025-05-13 DIAGNOSIS — G44.209 TENSION HEADACHE: Primary | ICD-10-CM

## 2025-05-13 DIAGNOSIS — M43.6 SPASTIC TORTICOLLIS: ICD-10-CM

## 2025-05-21 ENCOUNTER — THERAPY VISIT (OUTPATIENT)
Dept: PHYSICAL THERAPY | Facility: OTHER | Age: 85
End: 2025-05-21
Attending: NURSE PRACTITIONER
Payer: MEDICARE

## 2025-05-21 DIAGNOSIS — M43.6 SPASTIC TORTICOLLIS: ICD-10-CM

## 2025-05-21 DIAGNOSIS — G44.209 TENSION HEADACHE: Primary | ICD-10-CM

## 2025-05-22 ENCOUNTER — TRANSFERRED RECORDS (OUTPATIENT)
Dept: HEALTH INFORMATION MANAGEMENT | Facility: OTHER | Age: 85
End: 2025-05-22
Payer: COMMERCIAL

## 2025-05-22 ENCOUNTER — ONCOLOGY VISIT (OUTPATIENT)
Dept: ONCOLOGY | Facility: OTHER | Age: 85
End: 2025-05-22
Attending: SURGERY
Payer: MEDICARE

## 2025-05-22 VITALS
SYSTOLIC BLOOD PRESSURE: 120 MMHG | WEIGHT: 157.6 LBS | OXYGEN SATURATION: 99 % | HEART RATE: 86 BPM | RESPIRATION RATE: 16 BRPM | DIASTOLIC BLOOD PRESSURE: 60 MMHG | BODY MASS INDEX: 26.64 KG/M2 | TEMPERATURE: 99 F

## 2025-05-22 DIAGNOSIS — Z17.0 MALIGNANT NEOPLASM OF CENTRAL PORTION OF RIGHT BREAST IN FEMALE, ESTROGEN RECEPTOR POSITIVE (H): Primary | ICD-10-CM

## 2025-05-22 DIAGNOSIS — C50.111 MALIGNANT NEOPLASM OF CENTRAL PORTION OF RIGHT BREAST IN FEMALE, ESTROGEN RECEPTOR POSITIVE (H): Primary | ICD-10-CM

## 2025-05-22 DIAGNOSIS — Z17.0 MALIGNANT NEOPLASM OF UPPER-OUTER QUADRANT OF LEFT BREAST IN FEMALE, ESTROGEN RECEPTOR POSITIVE (H): ICD-10-CM

## 2025-05-22 DIAGNOSIS — C50.412 MALIGNANT NEOPLASM OF UPPER-OUTER QUADRANT OF LEFT BREAST IN FEMALE, ESTROGEN RECEPTOR POSITIVE (H): ICD-10-CM

## 2025-05-22 PROCEDURE — G0463 HOSPITAL OUTPT CLINIC VISIT: HCPCS

## 2025-05-22 RX ORDER — ANASTROZOLE 1 MG/1
1 TABLET ORAL DAILY
Qty: 90 TABLET | Refills: 1 | Status: SHIPPED | OUTPATIENT
Start: 2025-05-22

## 2025-05-22 RX ORDER — DIPHENHYDRAMINE HYDROCHLORIDE 50 MG/ML
25 INJECTION, SOLUTION INTRAMUSCULAR; INTRAVENOUS
Start: 2025-05-29

## 2025-05-22 RX ORDER — DIPHENHYDRAMINE HYDROCHLORIDE 50 MG/ML
50 INJECTION, SOLUTION INTRAMUSCULAR; INTRAVENOUS
Start: 2025-05-29

## 2025-05-22 RX ORDER — HEPARIN SODIUM (PORCINE) LOCK FLUSH IV SOLN 100 UNIT/ML 100 UNIT/ML
5 SOLUTION INTRAVENOUS
OUTPATIENT
Start: 2025-05-29

## 2025-05-22 RX ORDER — ALBUTEROL SULFATE 0.83 MG/ML
2.5 SOLUTION RESPIRATORY (INHALATION)
OUTPATIENT
Start: 2025-05-29

## 2025-05-22 RX ORDER — EPINEPHRINE 1 MG/ML
0.3 INJECTION, SOLUTION, CONCENTRATE INTRAVENOUS EVERY 5 MIN PRN
OUTPATIENT
Start: 2025-05-29

## 2025-05-22 RX ORDER — ANASTROZOLE 1 MG/1
1 TABLET ORAL DAILY
Qty: 90 TABLET | Refills: 1 | Status: SHIPPED | OUTPATIENT
Start: 2025-05-22 | End: 2025-05-23

## 2025-05-22 RX ORDER — ZOLEDRONIC ACID 0.04 MG/ML
4 INJECTION, SOLUTION INTRAVENOUS ONCE
OUTPATIENT
Start: 2025-05-29 | End: 2025-05-29

## 2025-05-22 RX ORDER — ALBUTEROL SULFATE 90 UG/1
1-2 INHALANT RESPIRATORY (INHALATION)
Start: 2025-05-29

## 2025-05-22 RX ORDER — HEPARIN SODIUM,PORCINE 10 UNIT/ML
5-20 VIAL (ML) INTRAVENOUS DAILY PRN
OUTPATIENT
Start: 2025-05-29

## 2025-05-22 RX ORDER — METHYLPREDNISOLONE SODIUM SUCCINATE 40 MG/ML
40 INJECTION INTRAMUSCULAR; INTRAVENOUS
Start: 2025-05-29

## 2025-05-22 RX ORDER — MEPERIDINE HYDROCHLORIDE 25 MG/ML
25 INJECTION INTRAMUSCULAR; INTRAVENOUS; SUBCUTANEOUS
OUTPATIENT
Start: 2025-05-29

## 2025-05-22 ASSESSMENT — PAIN SCALES - GENERAL: PAINLEVEL_OUTOF10: MILD PAIN (2)

## 2025-05-22 NOTE — NURSING NOTE
Pt presents to clinic today for Consult.      Medication Reconciliation: complete  Linda Olsen LPN
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
## 2025-05-27 ENCOUNTER — TELEPHONE (OUTPATIENT)
Dept: RADIATION ONCOLOGY | Facility: HOSPITAL | Age: 85
End: 2025-05-27

## 2025-05-27 NOTE — TELEPHONE ENCOUNTER
"This Writer Call to Schedule Appointment with Radiation however Patient stated \"Im 82 years old and I feel I'm to old to go through Radiation treatments\".  This informed patient if patient would like to schedule appointment with radiation please call.  "

## 2025-05-28 ENCOUNTER — HOSPITAL ENCOUNTER (OUTPATIENT)
Dept: CT IMAGING | Facility: OTHER | Age: 85
Discharge: HOME | End: 2025-05-28
Attending: NURSE PRACTITIONER
Payer: MEDICARE

## 2025-05-28 ENCOUNTER — THERAPY VISIT (OUTPATIENT)
Dept: PHYSICAL THERAPY | Facility: OTHER | Age: 85
End: 2025-05-28
Attending: NURSE PRACTITIONER
Payer: MEDICARE

## 2025-05-28 DIAGNOSIS — G44.209 TENSION HEADACHE: Primary | ICD-10-CM

## 2025-05-28 DIAGNOSIS — R19.8 ALTERED BOWEL FUNCTION: ICD-10-CM

## 2025-05-28 DIAGNOSIS — M43.6 SPASTIC TORTICOLLIS: ICD-10-CM

## 2025-05-28 DIAGNOSIS — R10.31 ABDOMINAL PAIN, RIGHT LOWER QUADRANT: ICD-10-CM

## 2025-05-28 DIAGNOSIS — R31.29 MICROSCOPIC HEMATURIA: Primary | ICD-10-CM

## 2025-05-28 PROCEDURE — 250N000011 HC RX IP 250 OP 636: Performed by: NURSE PRACTITIONER

## 2025-05-28 PROCEDURE — 250N000009 HC RX 250: Performed by: NURSE PRACTITIONER

## 2025-05-28 PROCEDURE — 74177 CT ABD & PELVIS W/CONTRAST: CPT

## 2025-05-28 PROCEDURE — 74177 CT ABD & PELVIS W/CONTRAST: CPT | Mod: 26 | Performed by: RADIOLOGY

## 2025-05-28 RX ORDER — IOPAMIDOL 755 MG/ML
90 INJECTION, SOLUTION INTRAVASCULAR ONCE
Status: COMPLETED | OUTPATIENT
Start: 2025-05-28 | End: 2025-05-28

## 2025-05-28 RX ADMIN — SODIUM CHLORIDE 60 ML: 9 INJECTION, SOLUTION INTRAVENOUS at 12:23

## 2025-05-28 RX ADMIN — IOPAMIDOL 90 ML: 755 INJECTION, SOLUTION INTRAVENOUS at 12:23

## 2025-06-03 ENCOUNTER — THERAPY VISIT (OUTPATIENT)
Dept: PHYSICAL THERAPY | Facility: OTHER | Age: 85
End: 2025-06-03
Attending: NURSE PRACTITIONER
Payer: COMMERCIAL

## 2025-06-03 DIAGNOSIS — M43.6 SPASTIC TORTICOLLIS: ICD-10-CM

## 2025-06-03 DIAGNOSIS — G44.209 TENSION HEADACHE: Primary | ICD-10-CM

## 2025-06-06 ENCOUNTER — LAB (OUTPATIENT)
Dept: LAB | Facility: OTHER | Age: 85
End: 2025-06-06
Payer: COMMERCIAL

## 2025-06-06 ENCOUNTER — RESULTS FOLLOW-UP (OUTPATIENT)
Dept: UROLOGY | Facility: OTHER | Age: 85
End: 2025-06-06

## 2025-06-06 DIAGNOSIS — R31.29 MICROSCOPIC HEMATURIA: ICD-10-CM

## 2025-06-06 LAB
ALBUMIN UR-MCNC: NEGATIVE MG/DL
APPEARANCE UR: CLEAR
BILIRUB UR QL STRIP: NEGATIVE
COLOR UR AUTO: YELLOW
GLUCOSE UR STRIP-MCNC: NEGATIVE MG/DL
HGB UR QL STRIP: NEGATIVE
KETONES UR STRIP-MCNC: NEGATIVE MG/DL
LEUKOCYTE ESTERASE UR QL STRIP: ABNORMAL
NITRATE UR QL: NEGATIVE
PH UR STRIP: 5.5 [PH] (ref 5–9)
SP GR UR STRIP: 1 (ref 1–1.03)
UROBILINOGEN UR STRIP-MCNC: NORMAL MG/DL

## 2025-06-06 PROCEDURE — 81003 URINALYSIS AUTO W/O SCOPE: CPT | Mod: ZL

## 2025-06-10 ENCOUNTER — OFFICE VISIT (OUTPATIENT)
Dept: INTERNAL MEDICINE | Facility: OTHER | Age: 85
End: 2025-06-10
Attending: NURSE PRACTITIONER
Payer: MEDICARE

## 2025-06-10 VITALS
TEMPERATURE: 97.7 F | BODY MASS INDEX: 25.23 KG/M2 | HEIGHT: 66 IN | HEART RATE: 76 BPM | SYSTOLIC BLOOD PRESSURE: 110 MMHG | OXYGEN SATURATION: 93 % | DIASTOLIC BLOOD PRESSURE: 70 MMHG | RESPIRATION RATE: 16 BRPM | WEIGHT: 157 LBS

## 2025-06-10 DIAGNOSIS — J30.2 SEASONAL ALLERGIC RHINITIS, UNSPECIFIED TRIGGER: ICD-10-CM

## 2025-06-10 DIAGNOSIS — D05.11 DUCTAL CARCINOMA IN SITU (DCIS) OF RIGHT BREAST: ICD-10-CM

## 2025-06-10 DIAGNOSIS — E21.3 HYPERPARATHYROIDISM: ICD-10-CM

## 2025-06-10 DIAGNOSIS — I47.10 PAROXYSMAL SUPRAVENTRICULAR TACHYCARDIA: ICD-10-CM

## 2025-06-10 DIAGNOSIS — E78.49 OTHER HYPERLIPIDEMIA: ICD-10-CM

## 2025-06-10 DIAGNOSIS — E83.52 HYPERCALCEMIA: ICD-10-CM

## 2025-06-10 DIAGNOSIS — M35.00 SJOGREN SYNDROME, UNSPECIFIED: ICD-10-CM

## 2025-06-10 DIAGNOSIS — Z00.00 MEDICARE ANNUAL WELLNESS VISIT, SUBSEQUENT: Primary | ICD-10-CM

## 2025-06-10 DIAGNOSIS — L30.9 DERMATITIS: ICD-10-CM

## 2025-06-10 DIAGNOSIS — I63.9 CEREBROVASCULAR ACCIDENT (CVA), UNSPECIFIED MECHANISM (H): ICD-10-CM

## 2025-06-10 DIAGNOSIS — M06.9 RHEUMATOID ARTHRITIS INVOLVING MULTIPLE SITES, UNSPECIFIED WHETHER RHEUMATOID FACTOR PRESENT (H): ICD-10-CM

## 2025-06-10 DIAGNOSIS — N18.31 STAGE 3A CHRONIC KIDNEY DISEASE (H): ICD-10-CM

## 2025-06-10 DIAGNOSIS — M81.0 AGE-RELATED OSTEOPOROSIS WITHOUT CURRENT PATHOLOGICAL FRACTURE: ICD-10-CM

## 2025-06-10 PROCEDURE — G0463 HOSPITAL OUTPT CLINIC VISIT: HCPCS

## 2025-06-10 RX ORDER — TRIAMCINOLONE ACETONIDE 5 MG/G
CREAM TOPICAL
Qty: 30 G | Refills: 3 | Status: SHIPPED | OUTPATIENT
Start: 2025-06-10

## 2025-06-10 RX ORDER — TRIAMCINOLONE ACETONIDE 55 UG/1
2 SPRAY, METERED NASAL DAILY
Qty: 16.9 ML | Refills: 5 | Status: SHIPPED | OUTPATIENT
Start: 2025-06-10

## 2025-06-10 RX ORDER — FEXOFENADINE HCL 180 MG/1
180 TABLET ORAL DAILY
Qty: 90 TABLET | Refills: 4 | Status: SHIPPED | OUTPATIENT
Start: 2025-06-10

## 2025-06-10 RX ORDER — NITROGLYCERIN 20 MG/G
OINTMENT TOPICAL
COMMUNITY
Start: 2025-05-21

## 2025-06-10 SDOH — HEALTH STABILITY: PHYSICAL HEALTH: ON AVERAGE, HOW MANY DAYS PER WEEK DO YOU ENGAGE IN MODERATE TO STRENUOUS EXERCISE (LIKE A BRISK WALK)?: 0 DAYS

## 2025-06-10 ASSESSMENT — PAIN SCALES - GENERAL: PAINLEVEL_OUTOF10: NO PAIN (0)

## 2025-06-10 ASSESSMENT — SOCIAL DETERMINANTS OF HEALTH (SDOH): HOW OFTEN DO YOU GET TOGETHER WITH FRIENDS OR RELATIVES?: PATIENT DECLINED

## 2025-06-10 NOTE — NURSING NOTE
"Chief Complaint   Patient presents with    Medicare Visit       FOOD SECURITY SCREENING QUESTIONS  Hunger Vital Signs:  Within the past 12 months we worried whether our food would run out before we got money to buy more. Never  Within the past 12 months the food we bought just didn't last and we didn't have money to get more. Never  Chayito Azul RN 6/10/2025 8:26 AM      Initial /70 (BP Location: Right arm, Patient Position: Sitting, Cuff Size: Adult Regular)   Pulse 76   Temp 97.7  F (36.5  C) (Tympanic)   Resp 16   Ht 1.664 m (5' 5.5\")   Wt 71.2 kg (157 lb)   LMP 10/11/2003 (Approximate)   SpO2 93%   BMI 25.73 kg/m   Estimated body mass index is 25.73 kg/m  as calculated from the following:    Height as of this encounter: 1.664 m (5' 5.5\").    Weight as of this encounter: 71.2 kg (157 lb).  Medication Reconciliation: complete    Chayito Azul RN    "
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
## 2025-06-16 ENCOUNTER — HOSPITAL ENCOUNTER (EMERGENCY)
Facility: OTHER | Age: 85
Discharge: HOME | End: 2025-06-17
Attending: FAMILY MEDICINE
Payer: MEDICARE

## 2025-06-16 ENCOUNTER — APPOINTMENT (OUTPATIENT)
Dept: GENERAL RADIOLOGY | Facility: OTHER | Age: 85
End: 2025-06-16
Attending: FAMILY MEDICINE
Payer: MEDICARE

## 2025-06-16 VITALS
HEIGHT: 66 IN | SYSTOLIC BLOOD PRESSURE: 151 MMHG | RESPIRATION RATE: 18 BRPM | DIASTOLIC BLOOD PRESSURE: 82 MMHG | BODY MASS INDEX: 25.23 KG/M2 | TEMPERATURE: 97.2 F | HEART RATE: 72 BPM | WEIGHT: 157 LBS | OXYGEN SATURATION: 96 %

## 2025-06-16 DIAGNOSIS — M25.511 ACUTE PAIN OF RIGHT SHOULDER: ICD-10-CM

## 2025-06-16 DIAGNOSIS — M05.711 RHEUMATOID ARTHRITIS INVOLVING RIGHT SHOULDER WITH POSITIVE RHEUMATOID FACTOR (H): ICD-10-CM

## 2025-06-16 LAB
HOLD SPECIMEN: NORMAL

## 2025-06-16 PROCEDURE — 99284 EMERGENCY DEPT VISIT MOD MDM: CPT | Performed by: FAMILY MEDICINE

## 2025-06-16 PROCEDURE — 250N000013 HC RX MED GY IP 250 OP 250 PS 637: Performed by: FAMILY MEDICINE

## 2025-06-16 PROCEDURE — 99283 EMERGENCY DEPT VISIT LOW MDM: CPT | Performed by: FAMILY MEDICINE

## 2025-06-16 PROCEDURE — 73030 X-RAY EXAM OF SHOULDER: CPT | Mod: RT

## 2025-06-16 PROCEDURE — 73030 X-RAY EXAM OF SHOULDER: CPT | Mod: 26 | Performed by: RADIOLOGY

## 2025-06-16 RX ORDER — ACETAMINOPHEN 500 MG
1000 TABLET ORAL ONCE
Status: COMPLETED | OUTPATIENT
Start: 2025-06-16 | End: 2025-06-16

## 2025-06-16 RX ADMIN — ACETAMINOPHEN 1000 MG: 500 TABLET, FILM COATED ORAL at 23:13

## 2025-06-16 ASSESSMENT — COLUMBIA-SUICIDE SEVERITY RATING SCALE - C-SSRS
6. HAVE YOU EVER DONE ANYTHING, STARTED TO DO ANYTHING, OR PREPARED TO DO ANYTHING TO END YOUR LIFE?: NO
2. HAVE YOU ACTUALLY HAD ANY THOUGHTS OF KILLING YOURSELF IN THE PAST MONTH?: NO
1. IN THE PAST MONTH, HAVE YOU WISHED YOU WERE DEAD OR WISHED YOU COULD GO TO SLEEP AND NOT WAKE UP?: NO

## 2025-06-16 ASSESSMENT — ACTIVITIES OF DAILY LIVING (ADL): ADLS_ACUITY_SCORE: 45

## 2025-06-17 NOTE — DISCHARGE INSTRUCTIONS
Verena    I recommend you take 1000 mg Tylenol four times a day, so about every six hours. We gave you Tylenol at 11 PM so you could take another dose at 5 AM.    Thank you for choosing our Emergency Department for your care.     You may receive a phone call or letter for a survey about your care in our ED.  Please complete this as this is how we improve care for our patients.     If you have any questions after leaving the ED you can call or text me on my cell phone at [phone].  I am not on call so if I do not answer my phone please leave a message- I will get back to you.  If you are not doing well please return to the ED.     Sincerely,    Dr Hiro Casillas M.D.  Medical Director  [facility] Emergency Department
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
## 2025-06-18 RX ORDER — PREDNISONE 20 MG/1
TABLET ORAL
Qty: 10 TABLET | Refills: 0 | Status: SHIPPED | OUTPATIENT
Start: 2025-06-18

## 2025-07-22 ENCOUNTER — OFFICE VISIT (OUTPATIENT)
Dept: INTERNAL MEDICINE | Facility: OTHER | Age: 85
End: 2025-07-22
Attending: NURSE PRACTITIONER
Payer: MEDICARE

## 2025-07-22 VITALS
BODY MASS INDEX: 25.2 KG/M2 | HEART RATE: 90 BPM | TEMPERATURE: 97 F | OXYGEN SATURATION: 98 % | RESPIRATION RATE: 16 BRPM | WEIGHT: 153.8 LBS | SYSTOLIC BLOOD PRESSURE: 116 MMHG | DIASTOLIC BLOOD PRESSURE: 72 MMHG

## 2025-07-22 DIAGNOSIS — C50.111 MALIGNANT NEOPLASM OF CENTRAL PORTION OF RIGHT BREAST IN FEMALE, ESTROGEN RECEPTOR POSITIVE (H): ICD-10-CM

## 2025-07-22 DIAGNOSIS — G44.209 TENSION HEADACHE: ICD-10-CM

## 2025-07-22 DIAGNOSIS — Z79.899 ENCOUNTER FOR LONG-TERM (CURRENT) USE OF MEDICATIONS: ICD-10-CM

## 2025-07-22 DIAGNOSIS — M05.79 SEROPOSITIVE RHEUMATOID ARTHRITIS OF MULTIPLE SITES (H): ICD-10-CM

## 2025-07-22 DIAGNOSIS — M19.012 PRIMARY OSTEOARTHRITIS OF LEFT SHOULDER: ICD-10-CM

## 2025-07-22 DIAGNOSIS — J30.89 SEASONAL ALLERGIC RHINITIS DUE TO OTHER ALLERGIC TRIGGER: Primary | ICD-10-CM

## 2025-07-22 DIAGNOSIS — Z17.0 MALIGNANT NEOPLASM OF UPPER-OUTER QUADRANT OF LEFT BREAST IN FEMALE, ESTROGEN RECEPTOR POSITIVE (H): ICD-10-CM

## 2025-07-22 DIAGNOSIS — Z17.0 MALIGNANT NEOPLASM OF CENTRAL PORTION OF RIGHT BREAST IN FEMALE, ESTROGEN RECEPTOR POSITIVE (H): ICD-10-CM

## 2025-07-22 DIAGNOSIS — C50.412 MALIGNANT NEOPLASM OF UPPER-OUTER QUADRANT OF LEFT BREAST IN FEMALE, ESTROGEN RECEPTOR POSITIVE (H): ICD-10-CM

## 2025-07-22 LAB
ALBUMIN SERPL BCG-MCNC: 4.2 G/DL (ref 3.5–5.2)
ALP SERPL-CCNC: 76 U/L (ref 40–150)
ALT SERPL W P-5'-P-CCNC: 20 U/L (ref 0–50)
ANION GAP SERPL CALCULATED.3IONS-SCNC: 12 MMOL/L (ref 7–15)
AST SERPL W P-5'-P-CCNC: 29 U/L (ref 0–45)
BASOPHILS # BLD AUTO: 0.1 10E3/UL (ref 0–0.2)
BASOPHILS NFR BLD AUTO: 1 %
BILIRUB SERPL-MCNC: 0.5 MG/DL
BUN SERPL-MCNC: 16.9 MG/DL (ref 8–23)
CALCIUM SERPL-MCNC: 10.4 MG/DL (ref 8.8–10.4)
CHLORIDE SERPL-SCNC: 102 MMOL/L (ref 98–107)
CREAT SERPL-MCNC: 0.93 MG/DL (ref 0.51–0.95)
CRP SERPL-MCNC: <3 MG/L
EGFRCR SERPLBLD CKD-EPI 2021: 60 ML/MIN/1.73M2
EOSINOPHIL # BLD AUTO: 0.2 10E3/UL (ref 0–0.7)
EOSINOPHIL NFR BLD AUTO: 3 %
ERYTHROCYTE [DISTWIDTH] IN BLOOD BY AUTOMATED COUNT: 13.9 % (ref 10–15)
ERYTHROCYTE [SEDIMENTATION RATE] IN BLOOD BY WESTERGREN METHOD: 26 MM/HR (ref 0–30)
GLUCOSE SERPL-MCNC: 100 MG/DL (ref 70–99)
HCO3 SERPL-SCNC: 22 MMOL/L (ref 22–29)
HCT VFR BLD AUTO: 40.4 % (ref 35–47)
HGB BLD-MCNC: 13.4 G/DL (ref 11.7–15.7)
IMM GRANULOCYTES # BLD: 0 10E3/UL
IMM GRANULOCYTES NFR BLD: 0 %
LYMPHOCYTES # BLD AUTO: 0.8 10E3/UL (ref 0.8–5.3)
LYMPHOCYTES NFR BLD AUTO: 14 %
MCH RBC QN AUTO: 32 PG (ref 26.5–33)
MCHC RBC AUTO-ENTMCNC: 33.2 G/DL (ref 31.5–36.5)
MCV RBC AUTO: 96 FL (ref 78–100)
MONOCYTES # BLD AUTO: 0.6 10E3/UL (ref 0–1.3)
MONOCYTES NFR BLD AUTO: 11 %
NEUTROPHILS # BLD AUTO: 4.1 10E3/UL (ref 1.6–8.3)
NEUTROPHILS NFR BLD AUTO: 70 %
NRBC # BLD AUTO: 0 10E3/UL
NRBC BLD AUTO-RTO: 0 /100
PLATELET # BLD AUTO: 261 10E3/UL (ref 150–450)
POTASSIUM SERPL-SCNC: 4.5 MMOL/L (ref 3.4–5.3)
PROT SERPL-MCNC: 7.2 G/DL (ref 6.4–8.3)
RBC # BLD AUTO: 4.19 10E6/UL (ref 3.8–5.2)
SODIUM SERPL-SCNC: 136 MMOL/L (ref 135–145)
WBC # BLD AUTO: 5.9 10E3/UL (ref 4–11)

## 2025-07-22 PROCEDURE — 82310 ASSAY OF CALCIUM: CPT | Mod: ZL | Performed by: NURSE PRACTITIONER

## 2025-07-22 PROCEDURE — 85652 RBC SED RATE AUTOMATED: CPT | Mod: ZL | Performed by: NURSE PRACTITIONER

## 2025-07-22 PROCEDURE — 86140 C-REACTIVE PROTEIN: CPT | Mod: ZL | Performed by: NURSE PRACTITIONER

## 2025-07-22 PROCEDURE — G0463 HOSPITAL OUTPT CLINIC VISIT: HCPCS

## 2025-07-22 PROCEDURE — 85025 COMPLETE CBC W/AUTO DIFF WBC: CPT | Mod: ZL | Performed by: NURSE PRACTITIONER

## 2025-07-22 PROCEDURE — 36415 COLL VENOUS BLD VENIPUNCTURE: CPT | Mod: ZL | Performed by: NURSE PRACTITIONER

## 2025-07-22 ASSESSMENT — PAIN SCALES - GENERAL: PAINLEVEL_OUTOF10: MILD PAIN (2)

## 2025-07-22 NOTE — PROGRESS NOTES
ICD-10-CM    1. Seasonal allergic rhinitis due to other allergic trigger  J30.89       2. Tension headache  G44.209       3. Malignant neoplasm of central portion of right breast in female, estrogen receptor positive (H)  C50.111     Z17.0       4. Malignant neoplasm of upper-outer quadrant of left breast in female, estrogen receptor positive (H)  C50.412     Z17.0       5. Primary osteoarthritis of left shoulder  M19.012          Plan:  -Continue with nasal spray.  Unable to tolerate nonsedating antihistamine.  Allergy is otherwise well-controlled.  -If continues to have problems with shoulder arthritis pain may benefit from CSI.  She will schedule appointment with sports medicine if she would like to treat further.  -Continue to follow with oncology.    Jacklyn Albarado is a 85 year old, presenting for the following health issues:  Follow Up (Allergies )      7/22/2025     9:37 AM   Additional Questions   Roomed by Chayito MARTEL     She is here today to follow-up on headaches related to allergic rhinitis and tension.  She has found that certain activities flare tension headache.  She knows that if she spends a lot of time working in her garden picking peas, raspberries, etc. then she will have more pain in the neck and headaches.  She has been trying to limit these activities.  Overall headache has improved.  She uses the nasal spray for allergic rhinitis but was not able to tolerate the over-the-counter antihistamine as it caused dry mouth.  She has Sjogren's.  She does follow with rheumatology.  Also has rheumatoid arthritis.  Has history of bilateral breast cancer.  Had recent lumpectomy.  Follows with oncology.  Was seen in the emergency department in June for left shoulder pain.  X-ray showed moderate glenohumeral arthritis.  She was treated with a course of prednisone.  All of her pain improved with the prednisone.  She still has some mild pain now with the left shoulder with certain movements.  She has  "never had CSI of the shoulder joint.    History of Present Illness       Headaches:   Since the patient's last clinic visit, headaches are: improved  The patient is getting headaches:  Daily  She is able to do normal daily activities when she has a migraine.  The patient is taking the following rescue/relief medications:  Tylenol   Patient states \"The relief is inconsistent\" from the rescue/relief medications.   The patient is taking the following medications to prevent migraines:  No medications to prevent migraines  In the past 4 weeks, the patient has gone to an Urgent Care or Emergency Room 0 times times due to headaches.    She eats 4 or more servings of fruits and vegetables daily.She consumes 0 sweetened beverage(s) daily.She exercises with enough effort to increase her heart rate 20 to 29 minutes per day.  She exercises with enough effort to increase her heart rate 4 days per week.   She is taking medications regularly.                      Objective    /72 (BP Location: Right arm, Patient Position: Sitting, Cuff Size: Adult Large)   Pulse 90   Temp 97  F (36.1  C) (Tympanic)   Resp 16   Wt 69.8 kg (153 lb 12.8 oz)   LMP 10/11/2003 (Approximate)   SpO2 98%   BMI 25.20 kg/m    Body mass index is 25.2 kg/m .  Physical Exam   Pleasant female no acute distress.  Deferred to ED visit note for shoulder examination            Signed Electronically by: Kaia Quinn NP    "

## 2025-07-22 NOTE — NURSING NOTE
"Chief Complaint   Patient presents with    Follow Up     Allergies        FOOD SECURITY SCREENING QUESTIONS  Hunger Vital Signs:  Within the past 12 months we worried whether our food would run out before we got money to buy more. Never  Within the past 12 months the food we bought just didn't last and we didn't have money to get more. Never  Chayito Azul RN 7/22/2025 9:38 AM      Initial /72 (BP Location: Right arm, Patient Position: Sitting, Cuff Size: Adult Large)   Pulse 90   Temp 97  F (36.1  C) (Tympanic)   Resp 16   Wt 69.8 kg (153 lb 12.8 oz)   LMP 10/11/2003 (Approximate)   SpO2 98%   BMI 25.20 kg/m   Estimated body mass index is 25.2 kg/m  as calculated from the following:    Height as of 6/16/25: 1.664 m (5' 5.5\").    Weight as of this encounter: 69.8 kg (153 lb 12.8 oz).  Medication Reconciliation: complete    Chayito Azul RN    "

## 2025-08-04 ENCOUNTER — DOCUMENTATION ONLY (OUTPATIENT)
Dept: UROLOGY | Facility: OTHER | Age: 85
End: 2025-08-04
Payer: COMMERCIAL

## 2025-08-04 DIAGNOSIS — R31.29 MICROSCOPIC HEMATURIA: Primary | ICD-10-CM

## 2025-08-14 ENCOUNTER — LAB (OUTPATIENT)
Dept: LAB | Facility: OTHER | Age: 85
End: 2025-08-14
Attending: UROLOGY
Payer: MEDICARE

## 2025-08-14 ENCOUNTER — OFFICE VISIT (OUTPATIENT)
Dept: UROLOGY | Facility: OTHER | Age: 85
End: 2025-08-14
Attending: UROLOGY
Payer: MEDICARE

## 2025-08-14 DIAGNOSIS — R31.29 MICROSCOPIC HEMATURIA: Primary | ICD-10-CM

## 2025-08-14 DIAGNOSIS — R31.29 MICROSCOPIC HEMATURIA: ICD-10-CM

## 2025-08-14 PROCEDURE — G0463 HOSPITAL OUTPT CLINIC VISIT: HCPCS | Mod: 25

## 2025-08-14 PROCEDURE — 81003 URINALYSIS AUTO W/O SCOPE: CPT | Mod: ZL

## 2025-08-14 PROCEDURE — 250N000013 HC RX MED GY IP 250 OP 250 PS 637: Performed by: UROLOGY

## 2025-08-14 PROCEDURE — 250N000009 HC RX 250: Performed by: UROLOGY

## 2025-08-14 RX ORDER — NITROFURANTOIN 25; 75 MG/1; MG/1
100 CAPSULE ORAL ONCE
Status: COMPLETED | OUTPATIENT
Start: 2025-08-14 | End: 2025-08-14

## 2025-08-14 RX ORDER — LIDOCAINE HYDROCHLORIDE 20 MG/ML
JELLY TOPICAL ONCE
Status: COMPLETED | OUTPATIENT
Start: 2025-08-14 | End: 2025-08-14

## 2025-08-14 RX ADMIN — LIDOCAINE HYDROCHLORIDE: 20 JELLY TOPICAL at 10:36

## 2025-08-14 RX ADMIN — NITROFURANTOIN (MONOHYDRATE/MACROCRYSTALS) 100 MG: 25; 75 CAPSULE ORAL at 10:36

## (undated) DEVICE — ESU GROUND PAD ADULT W/CORD E7507

## (undated) DEVICE — ESU ELEC BLADE 2.75" COATED/INSULATED E1455

## (undated) DEVICE — DRSG STERI STRIP 1/2X4" R1547

## (undated) DEVICE — ESU PENCIL SMOKE EVAC W/ROCKER SWITCH 0703-047-000

## (undated) DEVICE — GLOVE PROTEXIS POWDER FREE SMT 6.5  2D72PT65X

## (undated) DEVICE — PACK MAJOR LAPAROTOMY LF SBA15MLFCA

## (undated) DEVICE — GLOVE PROTEXIS BLUE W/NEU-THERA 6.5  2D73EB65

## (undated) DEVICE — TRANSPEC RADIOGRAPY DEVICE

## (undated) DEVICE — DRSG MEDIPORE 3 1/2X4" 3566

## (undated) DEVICE — PENCIL MEGADYNE TELESCOPING SMOKE EVACUATION 10 FT 251010J

## (undated) DEVICE — SOL WATER 1500ML

## (undated) DEVICE — PREP CHLORAPREP 26ML TINTED ORANGE  260815

## (undated) DEVICE — COVER LIGHT HANDLE LT-F02

## (undated) DEVICE — SLEEVE COMPRESSION SCD KNEE MED 74022

## (undated) DEVICE — Device

## (undated) RX ORDER — LIDOCAINE HYDROCHLORIDE 10 MG/ML
INJECTION, SOLUTION INFILTRATION; PERINEURAL
Status: DISPENSED
Start: 2022-03-17

## (undated) RX ORDER — LIDOCAINE HYDROCHLORIDE AND EPINEPHRINE 10; 10 MG/ML; UG/ML
INJECTION, SOLUTION INFILTRATION; PERINEURAL
Status: DISPENSED
Start: 2022-03-17

## (undated) RX ORDER — CEFAZOLIN SODIUM/WATER 2 G/20 ML
SYRINGE (ML) INTRAVENOUS
Status: DISPENSED
Start: 2022-03-30

## (undated) RX ORDER — ACETAMINOPHEN 325 MG/1
TABLET ORAL
Status: DISPENSED
Start: 2022-03-30

## (undated) RX ORDER — ONDANSETRON 2 MG/ML
INJECTION INTRAMUSCULAR; INTRAVENOUS
Status: DISPENSED
Start: 2022-03-30

## (undated) RX ORDER — KETOROLAC TROMETHAMINE 15 MG/ML
INJECTION, SOLUTION INTRAMUSCULAR; INTRAVENOUS
Status: DISPENSED
Start: 2021-03-12

## (undated) RX ORDER — DEXAMETHASONE SODIUM PHOSPHATE 4 MG/ML
INJECTION, SOLUTION INTRA-ARTICULAR; INTRALESIONAL; INTRAMUSCULAR; INTRAVENOUS; SOFT TISSUE
Status: DISPENSED
Start: 2022-03-30

## (undated) RX ORDER — LIDOCAINE HYDROCHLORIDE AND EPINEPHRINE 10; 10 MG/ML; UG/ML
INJECTION, SOLUTION INFILTRATION; PERINEURAL
Status: DISPENSED
Start: 2022-03-30

## (undated) RX ORDER — NITROFURANTOIN 25; 75 MG/1; MG/1
CAPSULE ORAL
Status: DISPENSED
Start: 2025-08-14

## (undated) RX ORDER — REGADENOSON 0.08 MG/ML
INJECTION, SOLUTION INTRAVENOUS
Status: DISPENSED
Start: 2023-07-19

## (undated) RX ORDER — DEXAMETHASONE SODIUM PHOSPHATE 4 MG/ML
INJECTION, SOLUTION INTRA-ARTICULAR; INTRALESIONAL; INTRAMUSCULAR; INTRAVENOUS; SOFT TISSUE
Status: DISPENSED
Start: 2025-04-16

## (undated) RX ORDER — IBUPROFEN 200 MG
TABLET ORAL
Status: DISPENSED
Start: 2025-04-16

## (undated) RX ORDER — FENTANYL CITRATE 50 UG/ML
INJECTION, SOLUTION INTRAMUSCULAR; INTRAVENOUS
Status: DISPENSED
Start: 2022-03-30

## (undated) RX ORDER — ACETAMINOPHEN 500 MG
TABLET ORAL
Status: DISPENSED
Start: 2025-06-16

## (undated) RX ORDER — LIDOCAINE HYDROCHLORIDE 20 MG/ML
INJECTION, SOLUTION EPIDURAL; INFILTRATION; INTRACAUDAL; PERINEURAL
Status: DISPENSED
Start: 2022-03-30

## (undated) RX ORDER — FENTANYL CITRATE-0.9 % NACL/PF 10 MCG/ML
PLASTIC BAG, INJECTION (ML) INTRAVENOUS
Status: DISPENSED
Start: 2025-04-16

## (undated) RX ORDER — PROPOFOL 10 MG/ML
INJECTION, EMULSION INTRAVENOUS
Status: DISPENSED
Start: 2025-04-16

## (undated) RX ORDER — BUPIVACAINE HYDROCHLORIDE AND EPINEPHRINE 5; 5 MG/ML; UG/ML
INJECTION, SOLUTION EPIDURAL; INTRACAUDAL; PERINEURAL
Status: DISPENSED
Start: 2025-04-16

## (undated) RX ORDER — ONDANSETRON 2 MG/ML
INJECTION INTRAMUSCULAR; INTRAVENOUS
Status: DISPENSED
Start: 2025-04-16

## (undated) RX ORDER — FENTANYL CITRATE 50 UG/ML
INJECTION, SOLUTION INTRAMUSCULAR; INTRAVENOUS
Status: DISPENSED
Start: 2025-04-16

## (undated) RX ORDER — SCOLOPAMINE TRANSDERMAL SYSTEM 1 MG/1
PATCH, EXTENDED RELEASE TRANSDERMAL
Status: DISPENSED
Start: 2022-03-30

## (undated) RX ORDER — PROPOFOL 10 MG/ML
INJECTION, EMULSION INTRAVENOUS
Status: DISPENSED
Start: 2022-03-30

## (undated) RX ORDER — LIDOCAINE HYDROCHLORIDE 10 MG/ML
INJECTION, SOLUTION INFILTRATION; PERINEURAL
Status: DISPENSED
Start: 2022-03-30

## (undated) RX ORDER — ONDANSETRON 2 MG/ML
INJECTION INTRAMUSCULAR; INTRAVENOUS
Status: DISPENSED
Start: 2021-03-12

## (undated) RX ORDER — ISOSULFAN BLUE 50 MG/5ML
INJECTION, SOLUTION SUBCUTANEOUS
Status: DISPENSED
Start: 2022-03-30

## (undated) RX ORDER — ISOSULFAN BLUE 50 MG/5ML
INJECTION, SOLUTION SUBCUTANEOUS
Status: DISPENSED
Start: 2025-04-16

## (undated) RX ORDER — ACETAMINOPHEN 325 MG/1
TABLET ORAL
Status: DISPENSED
Start: 2025-04-16

## (undated) RX ORDER — CEFAZOLIN SODIUM/WATER 2 G/20 ML
SYRINGE (ML) INTRAVENOUS
Status: DISPENSED
Start: 2025-04-16